# Patient Record
Sex: MALE | Race: WHITE | NOT HISPANIC OR LATINO | Employment: OTHER | URBAN - METROPOLITAN AREA
[De-identification: names, ages, dates, MRNs, and addresses within clinical notes are randomized per-mention and may not be internally consistent; named-entity substitution may affect disease eponyms.]

---

## 2023-09-15 ENCOUNTER — TRANSFERRED RECORDS (OUTPATIENT)
Dept: HEALTH INFORMATION MANAGEMENT | Facility: CLINIC | Age: 63
End: 2023-09-15

## 2024-02-12 ENCOUNTER — TRANSFERRED RECORDS (OUTPATIENT)
Dept: HEALTH INFORMATION MANAGEMENT | Facility: CLINIC | Age: 64
End: 2024-02-12

## 2024-03-23 ENCOUNTER — MEDICAL CORRESPONDENCE (OUTPATIENT)
Dept: TRANSPLANT | Facility: CLINIC | Age: 64
End: 2024-03-23

## 2024-03-25 ENCOUNTER — TELEPHONE (OUTPATIENT)
Dept: TRANSPLANT | Facility: CLINIC | Age: 64
End: 2024-03-25

## 2024-03-25 DIAGNOSIS — C90.00 MULTIPLE MYELOMA, REMISSION STATUS UNSPECIFIED (H): Primary | ICD-10-CM

## 2024-04-04 ENCOUNTER — CARE COORDINATION (OUTPATIENT)
Dept: TRANSPLANT | Facility: CLINIC | Age: 64
End: 2024-04-04

## 2024-04-04 NOTE — PROGRESS NOTES
[facility] BMT and Cell Therapy Program  RN Coordinator Pre-Visit Documentation      Inocente Romero is a 64 year old male who has been referred to the [facility] BMT and Cell Therapy Program for hematopoietic cell transplant or immune effector cell therapy.      Referring MD Name: Caitlyn Branch      Reason for referral: MM    Link to BMT & CT Program Algorithms            All relevant clinical notes, labs, imaging, and pathology may be reviewed in Epic Bookmarks under name: Katie Sawyer      Patient Care Team    No active team members.           Katie Sawyer RN

## 2024-04-05 ENCOUNTER — ALLIED HEALTH/NURSE VISIT (OUTPATIENT)
Dept: TRANSPLANT | Facility: CLINIC | Age: 64
End: 2024-04-05
Attending: INTERNAL MEDICINE
Payer: COMMERCIAL

## 2024-04-05 ENCOUNTER — TRANSFERRED RECORDS (OUTPATIENT)
Dept: HEALTH INFORMATION MANAGEMENT | Facility: CLINIC | Age: 64
End: 2024-04-05

## 2024-04-05 VITALS
RESPIRATION RATE: 16 BRPM | TEMPERATURE: 98 F | DIASTOLIC BLOOD PRESSURE: 105 MMHG | WEIGHT: 228.4 LBS | HEART RATE: 83 BPM | BODY MASS INDEX: 31.98 KG/M2 | OXYGEN SATURATION: 98 % | SYSTOLIC BLOOD PRESSURE: 182 MMHG | HEIGHT: 71 IN

## 2024-04-05 VITALS
HEART RATE: 83 BPM | HEIGHT: 71 IN | RESPIRATION RATE: 16 BRPM | BODY MASS INDEX: 31.98 KG/M2 | TEMPERATURE: 98 F | SYSTOLIC BLOOD PRESSURE: 182 MMHG | WEIGHT: 228.4 LBS | OXYGEN SATURATION: 98 % | DIASTOLIC BLOOD PRESSURE: 105 MMHG

## 2024-04-05 DIAGNOSIS — Z71.9 VISIT FOR COUNSELING: Primary | ICD-10-CM

## 2024-04-05 DIAGNOSIS — C90.00 MULTIPLE MYELOMA, REMISSION STATUS UNSPECIFIED (H): Primary | ICD-10-CM

## 2024-04-05 PROCEDURE — G0463 HOSPITAL OUTPT CLINIC VISIT: HCPCS

## 2024-04-05 PROCEDURE — 99205 OFFICE O/P NEW HI 60 MIN: CPT | Mod: GC

## 2024-04-05 RX ORDER — MAGNESIUM 200 MG
200 TABLET ORAL DAILY
COMMUNITY

## 2024-04-05 RX ORDER — ASPIRIN 81 MG/1
81 TABLET ORAL DAILY
COMMUNITY
End: 2024-07-15

## 2024-04-05 RX ORDER — LENALIDOMIDE 25 MG/1
25 CAPSULE ORAL
COMMUNITY
End: 2024-05-14

## 2024-04-05 RX ORDER — LISINOPRIL 10 MG/1
10 TABLET ORAL EVERY EVENING
Status: ON HOLD | COMMUNITY
End: 2024-07-30

## 2024-04-05 RX ORDER — ACYCLOVIR 800 MG/1
800 TABLET ORAL 2 TIMES DAILY
COMMUNITY
End: 2024-07-15

## 2024-04-05 RX ORDER — METOPROLOL SUCCINATE 100 MG/1
100 TABLET, EXTENDED RELEASE ORAL EVERY EVENING
COMMUNITY

## 2024-04-05 RX ORDER — ALLOPURINOL 300 MG/1
300 TABLET ORAL DAILY
COMMUNITY
End: 2024-07-15

## 2024-04-05 ASSESSMENT — PAIN SCALES - GENERAL
PAINLEVEL: NO PAIN (0)
PAINLEVEL: NO PAIN (0)

## 2024-04-05 NOTE — PROGRESS NOTES
BMT / Cell Therapy Consultation      Inocente Romero is a 64 year old male referred by Dr. Sterling Jackman for MM.       Disease presentation and baseline characteristics: [facility])  MM IgG K, R-ISS stage -, standard risk    At time of diagnosis:   8/2023: M spike 6.35,   BMBx kappa restricted plasma cells 70-80%,   PET/CT diffuse marrow hypermetabolism.     Date Treatment Name Response Side Effects / Toxicities   10/27/23-3/15/24 Vrd x 8 cycles  Fatigue, diarrhea, abdominal cramps   10/27/23 Zometa                          Date   Serum M spike Kappa  Lambda K/L  IgG  3/8/24  0.93        1429  2/23/24 1.47        2038  1/19/24 1.48        1770  8/2023  6.35      HPI:  Please see my entry above for hematologic history.  7/2023 seen at [facility] ER for bronchitis and diarrhea then found to be septic admitted to ICU for a week (not intubated) improved with antibiotics. Course was complicated by Afib during hospitalization improved. Off diltiazem. He was on metoprolol for HTN before for HTN. Since discharge, he never had Afib again. He was found to have Anemia to hgb 7.7. Eventual work up showed SPEP showed IgG Kappa M spike 6.35 IgG Kappa. Referred to [facility] and following Dr. Jackman. Subsequent BMBx led to diagnosis of MM.     Felix is with his wife Comfort today. He reports he is doing well. Sometimes has occasional diarrhea and fatigue. Fatigue mostly in the afternoons. Fatigue has been gradually increasing with further cycles. He reports gaining 8 pounds in week and half. He reports he has been eating much. Otherwise he feels pretty well.   Denies neuropathy issues.   Had some concerns of signitive issues. Had a pre tranplant mental status exam at [facility]- deemed to have good understanding.  Today he notes that he did not do the pysch test that well. He reports he is usually a very shrap person and has great memory. But since treatment, he reports feeling chemo brain fog.  Wife Comfort  has been a good support system.    Today [facility]    ASSESSMENT AND PLAN:  #MM  KPS 90    HCTCI: 1, 3% NRM    He has good partial response to the current treatment almost heading close to VGPR. He is young and qualifies for the auto Transplant.    Today we discussed the process of autologous stem cell transplant for myeloma in detail.  We reviewed that transplant for multiple myeloma is not curative, but does provide the opportunity for improved disease control and longer remission following induction.  We discussed the process of pre-transplant organ evaluation followed by stem cell collection, high-dose chemotherapy and stem cell infusion.  Risks of autologous transplant were outlined, including but not limited to low blood counts, need for transfusions, secondary malignancies, and possible hospitalization.  I reviewed that although the procedure will be done in the outpatient setting there is an approximately 50% chance of admission to the hospital due to complications in the weeks following transplant.  I discussed that autologous transplant is a relatively safe procedure but still typically carries a 1-3% mortality risk.  I also reviewed that following transplant for a month a 24/7 caregiver is required.      Assuming transplant goes well at D+28 restaging is performed and the patient will then re-establish with their primary oncologist.  Maintenance therapy is recommended to start after D+60 assuming the patient is clinically doing well.  I reviewed with the patient that maintenance therapy may be continued for several years following transplant assuming continued remission.  We reviewed that following transplant repeat vaccinations are recommended.     The patient and  had a number of appropriate questions that were answered today and felt comfortable proceeding with transplant following induction. He prefers to plan transplant after July 3rd to ensure he drives his  son to [email or web address]. He is agreeable to have stem cell collected mid May accordingly.       #Hx of Afib during sepsis episode  Improved per patient.   On metoprolol daily      #HTN:  On metoprolol and lisinopril. Today BP , asymptomatic. [facility]. BP higher on day he gets sterioids and decrease other days when he checks home bP.  - Could consider reducing dose of dexamethasone.      Plan:   -  will initiate process for auto SCT- per patient request plan [place] autSCT after sometime after July3rd.   - Plan stem cell mobilization around mid May.  - Labs, BMBX, PET/CT to determine treatment response, TTE, PFTs in May  - meanwhile continue MM treatment with Dr. Jackman.  - Consider reducing dexamethasone dose for elevated BP and weight gain  - Continue ACV, aspirin  - Arrange BMT follow up to review pre transplant work up.    I spent 60 minutes in the care of this patient today, which included time necessary for preparation for the visit, obtaining history, ordering medications/tests/procedures as medically indicated, review of pertinent medical literature, counseling of the patient, communication of recommendations to the care team, and documentation time.    Patient seen and discussed with Dr. Carlin. Please see addendum for further details.    Ana Marvin MD  Hematology and Medical Oncology Fellow, PGY-5  [facility]  Pager: [phone]      ROS:    10 point ROS neg other than the symptoms noted above in the HPI.      No past medical history on file.  PMHx:  HTN  Hearing loss,   Obesity  ED  Sepsis requiring ICU  not intubated (7/2023)  Anemia  Afib (7/2023) during sepsis- improved since then      Some concerns of cognitive issues.     No past surgical history on file.    No family history on file.    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       No Known Allergies     Current Outpatient Medications   Medication Sig Dispense Refill    ACYCLOVIR PO Take 1,600 mg by mouth daily      allopurinol (ZYLOPRIM) 300 MG tablet Take 300 mg by mouth daily      aspirin 81 MG EC tablet Take 81 mg by mouth daily      dexAMETHasone 0.4 MG INST Take 40 mg by mouth once a week      LENalidomide (REVLIMID) 25 MG CAPS capsule Take 25 mg by mouth      lisinopril (ZESTRIL) 10 MG tablet Take 10 mg by mouth daily      magnesium 200 MG TABS Take 200 mg by mouth daily      metoprolol succinate ER (TOPROL XL) 100 MG 24 hr tablet Take 100 mg by mouth daily           Physical Exam:     Vital Signs: BP (!) 182/105 (BP Location: Right arm, Patient Position: Sitting, Cuff Size: Adult Regular)   Pulse 83   Temp 98  F (36.7  C) (Oral)   Resp 16   Ht 1.8 m (5' 10.87\")   Wt 103.6 kg (228 lb 6.4 oz)   SpO2 98%   BMI 31.98 kg/m      KPS:  90  General Appearance: alert, and no distress  Eyes: PERRL, conjunctiva and lids normal, sclera nonicteric  Ears/Nose/M/Throat: Oral mucosa and posterior oropharynx normal, moist mucous membranes  Neck supple, non-tender, free range of motion, no adenopathy  Cardio/Vascular:regular rate and rhythm, normal S1 and S2, no murmur  Resp Effort And Auscultation: Normal - Clear to auscultation without rales, rhonchi, or wheezing.  GI: soft, nontender, bowel sounds present in all four quadrants, no hepatosplenomegaly  Lymphatics:no significant enlargement of lymph nodes globally   Musculoskeletal: Musculoskeletal normal  Edema: none  Skin: Skin color, texture, turgor normal. No rashes or lesions.  Neurologic: Gait normal.  Sensation grossly " "WNL.  Psych/Affect: Mood and affect are appropriate.  Vascular Access:  none    Blood Counts     No lab results found.    Invalid input(s): \"AIG3\"  At time of diagnosis:      Most recent labs   Hgb improved to 12          Chemistries     Basic Panel  No lab results found.     Calcium, Magnesium, Phosphorus  No lab results found.     LFTs  No lab results found.    LDH  No lab results found.    B2-Microglobulin  No lab results found.      Immunoglobulins     No lab results found.    No lab results found.    No lab results found.      Monocloncal Protein Studies     M spike    No lab results found.    Kappa FLC    No lab results found.    Lambda FLC    No lab results found.    FLC Ratio    No lab results found.      Bone Marrow Biopsy       Morphology    No results found for this or any previous visit (from the past 8760 hour(s)).                    PET Scan       No results found for this or any previous visit.           Inocente understood the above assessment and recommendations.  Multiple questions answered.  No barriers to learning identified.       ECHO:  Other diagnostics:      PFTs:    ------------------------------------------------------------------------------------------------------------------------------------------------    Patient Care Team    No active team members.       "

## 2024-04-05 NOTE — NURSING NOTE
"Oncology Rooming Note    April 5, 2024 1:02 PM   Inocente Romero is a 64 year old male who presents for:    Chief Complaint   Patient presents with    Oncology Clinic Visit     Multiple Myeloma     Initial Vitals: BP (!) 182/105 (BP Location: Right arm, Patient Position: Sitting, Cuff Size: Adult Regular)   Pulse 83   Temp 98  F (36.7  C) (Oral)   Resp 16   Ht 1.8 m (5' 10.87\")   Wt 103.6 kg (228 lb 6.4 oz)   SpO2 98%   BMI 31.98 kg/m   Estimated body mass index is 31.98 kg/m  as calculated from the following:    Height as of this encounter: 1.8 m (5' 10.87\").    Weight as of this encounter: 103.6 kg (228 lb 6.4 oz). Body surface area is 2.28 meters squared.  No Pain (0) Comment: Data Unavailable   No LMP for male patient.  Allergies reviewed: Yes  Medications reviewed: Yes    Medications: Medication refills not needed today.  Pharmacy name entered into EPIC: Data Unavailable    Frailty Screening:   Is the patient here for a new oncology consult visit in cancer care? 1. Yes. Over the past month, have you experienced difficulty or required a caregiver to assist with:   1. Balance, walking or general mobility (including any falls)? NO  2. Completion of self-care tasks such as bathing, dressing, toileting, grooming/hygiene?  NO  3. Concentration or memory that affects your daily life?  NO       Clinical concerns: none       Lindsey Rich              "

## 2024-04-05 NOTE — PROGRESS NOTES
Blood and Marrow Transplant - New Evaluation Appointment    Spoke with Felix and patient's spouse, following visit with Dr. Carlin. I explained the role of the nurse coordinator throughout the process, as well as general time line and expectations for next steps. We discussed the necessity of a caregiver and the program's proximity requirements. All questions were answered.     Plan: Autologous transplant    Timeline Notes:May or June, pt ready to proceed now but could get one more cycle of therapy if he chooses to, pt will call intake with decision on timing    Contact information provided for :  yes      Auto for MM/Amyloidosis:  Outpatient Infusion: Yes    Phase Status updated: yes

## 2024-04-05 NOTE — NURSING NOTE
[facility] Frailty assessment completed with patient in clinic. Patient had no questions and showed understanding of what assessment was being done.    Kavon Biggs LPN on 4/5/2024 at 3:54 PM

## 2024-04-05 NOTE — PROGRESS NOTES
"Blood and Marrow Transplant   New Transplant Visit with   Clinical     Assessment completed on 4/5/2024 in the BMT clinic of living situation, support system, financial status, functional status, coping, stressors, need for resources and social work intervention provided as needed. Information for this assessment was provided by pt and pt's spouse Comfort's report, consultation with medical team, and medical chart review.      Present:  Patient: Inocente Romero (\"Felix\")  Spouse: Comfort Romero  : ANALI Thurman, [facility]    Medical Team   Nurse Coordinator: Katie Sawyer RN  BMT Physician: Gianfranco Carlin MD  Referring Physician: Caitlyn Branch MD    Diagnosis: Multiple Myeloma  Diagnosis Date: 8/2023    Presenting Information:  Pt is a 64 year old male diagnosed with Multiple Myeloma. Pt was diagnosed on 8/2023. Pt presents for OP Autologous stem cell transplant discussion.    Contact Information:  Pt's Cell Phone: [phone]  Pt's Spouse Comfort's Phone: [phone]  Pt's Email: [email or web address]  Pt's Spouse Comfort's Email: [email or web address]    Of Note: Pt requests any email communication to be sent to both his and spouse's email.     Special Needs:   No needs identified at this time.     Relocation Requirement:   Pt lives in [place], [place] (approximately 45 minutes from [facility]) which is within the required distance of the hospital. Pt does not need to relocate.     Living Situation:   Pt lives in [place], [place] with spouse Comfort and 2 children (Song-13 and Charli-9).    Family Information:   Spouse: Comfort Romero  Parents: Mother Mary Jane and Father Inocente (travel to/from [place] 6 months out of the year)  Siblings: Sister Abiola Sharp-61 ([place], [place]) and Sister Rosibel-54 ([place])  Children: Malik-37 ([facility] [place]); Constantin-31 ([place], [place]); Song-13; Charli-9    Education/Employment:  Currently employed: No  Previous Employer: [employer]  Occupation: Special Needs " Transport     Spouse/Partner Employed: Yes-F/T  Occupation: Human Resources  *Can work remotely-place of employment is approximately a mile from residence.    Insurance:   [facility]. No insurance concerns identified at this time. HAMIDA provided information regarding the insurance authorization process and the role of the [employer] . HAMIDA provided contact info for the [employer]  and referred pt to them for future insurance questions.     Of Note: [facility]. Pt/Spouse shared that the [facility] can provide transportation reimbursement for parking related costs.      Finances:   Pt's source of income is salary/wages from pts spouse's employment. Pt shared that he plans to begin to receive Social Security income in May/2024. No financial concerns identified at this time. HAMIDA discussed carline options and asked pt to let SW know if they would like to apply in the future.     Caregiver:   HAMIDA discussed with the pt and spouse the caregiver role and expectation at length. Pt is agreeable to having a full time caregiver for the minimum of 30 days until cleared by the BMT Physician. Pt's identified caregiver is spouse Comfort. Caregiver education and information provided. No caregiver concerns identified.     Healthcare Directive:  No. SW provided education and forms. SW encouraged pt to have discussions with their family regarding their health care wishes.  In the absence of a healthcare document, HAMIDA discussed the [place] Policy on who would make decisions on pts behalf if he did not have the capacity to make healthcare decisions.    Resources Provided:  -BMT Information Book  -BMT Resources Packet  -Healthcare Directive  -Honoring Choices - Your Rights: Making Your Own Health Care Treatment Decisions  -Caregiver Contract/Description  -Transplant Unit Description and Information   -Lodging Resources    Identified Concerns:  No concerns identified at this time.  "    Summary:  Pt presents to [facility] regarding an OP Autologous stem cell transplant. Pt and pt's spouse asked good/appropriate questions regarding psychosocial factors related to BMT; all questions were addressed. Pt presented as pleasant. Pt's affect was appropriate. Patient indicated they are currently feeling \"overwhelmed by all the information\". Family's affect was appropriate.    Plan:   SW provided contact information and encouraged pt to contact SW with any additional questions, concerns, resources and/or for support. SW will continue to follow pt to provide support and guidance with resources as needed.     ANALI Thurman, [facility]  Adult Blood & Marrow Transplant   Phone: [phone]  Pager: [phone]      "

## 2024-04-05 NOTE — LETTER
4/5/2024         RE: Inocente Romero  [address]  [place] [address]        Dear Colleague,    Thank you for referring your patient, Inocente Romero, to the [facility] BLOOD AND MARROW TRANSPLANT PROGRAM [place]. Please see a copy of my visit note below.         BMT / Cell Therapy Consultation      Inocente Romero is a 64 year old male referred by Dr. Sterling Jackman for MM.       Disease presentation and baseline characteristics: [facility])  MM IgG K, R-ISS stage -, standard risk    At time of diagnosis:   8/2023: M spike 6.35,   BMBx kappa restricted plasma cells 70-80%,   PET/CT diffuse marrow hypermetabolism.     Date Treatment Name Response Side Effects / Toxicities   10/27/23-3/15/24 Vrd x 8 cycles  Fatigue, diarrhea, abdominal cramps   10/27/23 Zometa                          Date   Serum M spike Kappa  Lambda K/L  IgG  3/8/24  0.93        1429  2/23/24 1.47        2038  1/19/24 1.48        1770  8/2023  6.35      HPI:  Please see my entry above for hematologic history.  7/2023 seen at [facility] ER for bronchitis and diarrhea then found to be septic admitted to ICU for a week (not intubated) improved with antibiotics. Course was complicated by Afib during hospitalization improved. Off diltiazem. He was on metoprolol for HTN before for HTN. Since discharge, he never had Afib again. He was found to have Anemia to hgb 7.7. Eventual work up showed SPEP showed IgG Kappa M spike 6.35 IgG Kappa. Referred to [facility] and following Dr. Jackman. Subsequent BMBx led to diagnosis of MM.     Felix is with his wife Comfort today. He reports he is doing well. Sometimes has occasional diarrhea and fatigue. Fatigue mostly in the afternoons. Fatigue has been gradually increasing with further cycles. He reports gaining 8 pounds in week and half. He reports he has been eating much. Otherwise he feels pretty well.   Denies neuropathy issues.   Had some concerns of signitive  issues. Had a pre tranplant mental status exam at [facility]- deemed to have good understanding.  Today he notes that he did not do the pysch test that well. He reports he is usually a very shrap person and has great memory. But since treatment, he reports feeling chemo brain fog.  Wife Comfort has been a good support system.    Today [facility]    ASSESSMENT AND PLAN:  #MM  KPS 90    HCTCI: 1, 3% NRM    He has good partial response to the current treatment almost heading close to VGPR. He is young and qualifies for the auto Transplant.    Today we discussed the process of autologous stem cell transplant for myeloma in detail.  We reviewed that transplant for multiple myeloma is not curative, but does provide the opportunity for improved disease control and longer remission following induction.  We discussed the process of pre-transplant organ evaluation followed by stem cell collection, high-dose chemotherapy and stem cell infusion.  Risks of autologous transplant were outlined, including but not limited to low blood counts, need for transfusions, secondary malignancies, and possible hospitalization.  I reviewed that although the procedure will be done in the outpatient setting there is an approximately 50% chance of admission to the hospital due to complications in the weeks following transplant.  I discussed that autologous transplant is a relatively safe procedure but still typically carries a 1-3% mortality risk.  I also reviewed that following transplant for a month a 24/7 caregiver is required.      Assuming transplant goes well at D+28 restaging is performed and the patient will then re-establish with their primary oncologist.  Maintenance therapy is recommended to start after D+60 assuming the patient is clinically doing well.  I reviewed with the patient that maintenance therapy may be continued for several years following transplant assuming continued remission.  We reviewed  that following transplant repeat vaccinations are recommended.     The patient and  had a number of appropriate questions that were answered today and felt comfortable proceeding with transplant following induction. He prefers to plan transplant after July 3rd to ensure he drives his son to baseballl league. He is agreeable to have stem cell collected mid May accordingly.       #Hx of Afib during sepsis episode  Improved per patient.   On metoprolol daily      #HTN:  On metoprolol and lisinopril. Today BP , asymptomatic. [facility]. BP higher on day he gets sterioids and decrease other days when he checks home bP.  - Could consider reducing dose of dexamethasone.      Plan:   -  will initiate process for auto SCT- per patient request plan fort autSCT after sometime after July3rd.   - Plan stem cell mobilization around mid May.  - Labs, BMBX, PET/CT to determine treatment response, TTE, PFTs in May  - meanwhile continue MM treatment with Dr. Jackman.  - Consider reducing dexamethasone dose for elevated BP and weight gain  - Continue ACV, aspirin  - Arrange BMT follow up to review pre transplant work up.    I spent 60 minutes in the care of this patient today, which included time necessary for preparation for the visit, obtaining history, ordering medications/tests/procedures as medically indicated, review of pertinent medical literature, counseling of the patient, communication of recommendations to the care team, and documentation time.    Patient seen and discussed with Dr. Carlin. Please see addendum for further details.    Ana Marvin MD  Hematology and Medical Oncology Fellow, PGY-5  [facility]  Pager: [phone]      ROS:    10 point ROS neg other than the symptoms noted above in the HPI.      No past medical history on file.  PMHx:  HTN  Hearing loss,   Obesity  ED  Sepsis requiring ICU  not intubated (7/2023)  Anemia  Afib (7/2023) during sepsis-  "improved since then      Some concerns of cognitive issues.     No past surgical history on file.    No family history on file.    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       No Known Allergies     Current Outpatient Medications   Medication Sig Dispense Refill    ACYCLOVIR PO Take 1,600 mg by mouth daily      allopurinol (ZYLOPRIM) 300 MG tablet Take 300 mg by mouth daily      aspirin 81 MG EC tablet Take 81 mg by mouth daily      dexAMETHasone 0.4 MG INST Take 40 mg by mouth once a week      LENalidomide (REVLIMID) 25 MG CAPS capsule Take 25 mg by mouth      lisinopril (ZESTRIL) 10 MG tablet Take 10 mg by mouth daily      magnesium 200 MG TABS Take 200 mg by mouth daily      metoprolol succinate ER (TOPROL XL) 100 MG 24 hr tablet Take 100 mg by mouth daily           Physical Exam:     Vital Signs: BP (!) 182/105 (BP Location: Right arm, Patient Position: Sitting, Cuff Size: Adult Regular)   Pulse 83   Temp 98  F (36.7  C) (Oral)   Resp 16   Ht 1.8 m (5' 10.87\")   Wt 103.6 kg (228 lb 6.4 oz)   SpO2 98%   BMI 31.98 kg/m      KPS:  90  General Appearance: alert, and no distress  Eyes: PERRL, conjunctiva and lids normal, sclera nonicteric  Ears/Nose/M/Throat: Oral mucosa and posterior oropharynx normal, moist mucous membranes  Neck supple, non-tender, free range of motion, no adenopathy  Cardio/Vascular:regular rate and rhythm, normal S1 and S2, no murmur  Resp Effort And Auscultation: Normal - Clear to auscultation without rales, rhonchi, or wheezing.  GI: soft, nontender, " "bowel sounds present in all four quadrants, no hepatosplenomegaly  Lymphatics:no significant enlargement of lymph nodes globally   Musculoskeletal: Musculoskeletal normal  Edema: none  Skin: Skin color, texture, turgor normal. No rashes or lesions.  Neurologic: Gait normal.  Sensation grossly WNL.  Psych/Affect: Mood and affect are appropriate.  Vascular Access:  none    Blood Counts     No lab results found.    Invalid input(s): \"AIG3\"  At time of diagnosis:      Most recent labs   Hgb improved to 12          Chemistries     Basic Panel  No lab results found.     Calcium, Magnesium, Phosphorus  No lab results found.     LFTs  No lab results found.    LDH  No lab results found.    B2-Microglobulin  No lab results found.      Immunoglobulins     No lab results found.    No lab results found.    No lab results found.      Monocloncal Protein Studies     M spike    No lab results found.    Kappa FLC    No lab results found.    Lambda FLC    No lab results found.    FLC Ratio    No lab results found.      Bone Marrow Biopsy       Morphology    No results found for this or any previous visit (from the past 8760 hour(s)).                    PET Scan       No results found for this or any previous visit.           Inocente understood the above assessment and recommendations.  Multiple questions answered.  No barriers to learning identified.       ECHO:  Other diagnostics:      PFTs:    ------------------------------------------------------------------------------------------------------------------------------------------------    Patient Care Team    No active team members.       Attestation signed by Gianfranco Carlin MD at 4/5/2024  4:49 PM:  I, Gianfranco Carlin MD, have personally seen this patient independently of the fellow, Dr. Marvin. I have personally reviewed vital signs, medications, labs, imaging, and hospital course. I agree with their findings and plan of care as documented in the note with the following " additions/exceptions:    Key findings:  Today, I met with Inocente Romero and his wife. We discussed the natural history of the disease and treatment to date. We reviewed all the available diagnostic information and briefly discussed some of the more important prognostic points. We talked about general indications of transplant that include patient and disease factors. We also reviewed again the role of high dose chemotherapy and autologous stem cell rescue in this disease. I described the process of work up prior to collection, the process of collection itself including the possibility for a need for a central line with possible associated complications of infections and clotting. I discussed the process of work up prior to to confirm good organ function and reserve and reassess disease prior to proceeding. We also discussed in great detail the process of the actual transplant itself. We discussed the expected toxicities and side effects, including organ toxicity, expected pancytopenia and need for transfusion support, risk of infection or bleeding, and the risk of treatment related mortality which is estimated to be around 1-2%. We also discussed supportive care, needing a line with associated complications, and the critical need for a caregiver and proximity to [facility].    All of their questions were answered to their satisfaction.    Standard risk MM, now in [place] coming close to VGPR after VRd. Feels well. Has some diarrhea and side effects with steroids up and down but still fairly active and independent. Well read on ASCT and other issues. Interested in pursuing. He appears to be a good candidate. We discussed timing and he was in between May and June and will let us know because his kids have a baseball tournament. It could be that we collect him end of May and transplant early June.    Gianfranco Carlin MD  Date of Service (when I saw the patient): 04/05/2024     60 minutes spent on the date of the encounter  doing chart review, interpretation of results, patient visit, documentation and coordination of care.

## 2024-04-16 DIAGNOSIS — Z01.818 EXAMINATION PRIOR TO CHEMOTHERAPY: ICD-10-CM

## 2024-04-16 DIAGNOSIS — Z86.2 PERSONAL HISTORY OF DISEASES OF BLOOD AND BLOOD-FORMING ORGANS: ICD-10-CM

## 2024-04-16 DIAGNOSIS — E55.9 VITAMIN D DEFICIENCY: Primary | ICD-10-CM

## 2024-04-16 DIAGNOSIS — C90.00 MULTIPLE MYELOMA, REMISSION STATUS UNSPECIFIED (H): ICD-10-CM

## 2024-04-17 ENCOUNTER — TELEPHONE (OUTPATIENT)
Dept: TRANSPLANT | Facility: CLINIC | Age: 64
End: 2024-04-17

## 2024-04-17 NOTE — TELEPHONE ENCOUNTER
"BMT CSW Telephone Encounter  Clinical Social Work  [facility]      Focus: Supportive Counseling /Resources    Data: Pt is a 64 year old male undergoing evaluation for an OP Autologous transplant for Multiple Myeloma.    Interventions: Clinical  (CSW) provided a return call and spoke w/ Pts spouse Comfort via phone on 4/17/2024 to assist with inquiries about caregiver responsibilities and scheduling issues.  Comfort shared that pt reached out to the BMT office yesterday to inquire about the possibility of workup being pushed back due to various family obligations.  However, pt/spouse were informed that workup could not be pushed back and would need to remain on the current schedule.  CSW addressed questions regarding caregiver responsibilities.  Pts spouse Comfort shared that she has been feeling \"overwhelmed\" as work up has been scheduled earlier than anticipated. Comfort shared that she had not realized that patient would be \"completely outpatient\" and had anticipated that pt would be admitted to the hospital for a period of time. CSW provided explanations and education about process.  Comfort acknowledged more clarity and understanding about the process.  CSW offered to provide more information regarding support resources. Comfort reported that she will reach out if further resources are needed.  She shared that she has connected with some online support resources and chat groups. CSW assessed coping, provide supportive counseling and assist with resources as needed. SW encouraged Pt to contact CSW for support, questions and/or resources.    Assessment: Pt continues to be supported by spouse Comfort.     Plan: CSW will continue to work with Pt and family to provide supportive counseling and assist with resources as needed. CSW will continue to collaborate with multidisciplinary team regarding Pt's plan of care.     ANALI Thurman, [facility]  Adult Blood & Marrow Transplant Social " Worker  Phone: [phone]  VOCERA SEARCHABLE: BMT SW #4  Support Groups at [facility]: Social Work Services for Cancer Patients ([email or web address])

## 2024-05-06 ENCOUNTER — TELEPHONE (OUTPATIENT)
Dept: TRANSPLANT | Facility: CLINIC | Age: 64
End: 2024-05-06

## 2024-05-06 NOTE — TELEPHONE ENCOUNTER
BMT CSW Telephone Encounter  Clinical Social Work  [facility]      Focus: Parking Inquiries    Data: Pt is a 64 year old male undergoing evaluation for an OP Autologous transplant for Multiple Myeloma.     Interventions: Clinical  (CSW) spoke w/ Pt via phone on 5/6/2024 to assist with inquiries about parking.  CSW addressed all questions regarding parking passes. CSW assessed coping, provide supportive counseling and assist with resources as needed. SW encouraged Pt to contact CSW for support, questions and/or resources.    Assessment: Pt presented as pleasant.  Pt appears to be coping appropriately at this time. Pt continues to be supported by spouse Comfort.     Plan: CSW will continue to work with Pt and family to provide supportive counseling and assist with resources as needed. CSW will continue to collaborate with multidisciplinary team regarding Pt's plan of care.     ANALI Thurman, [facility]  Adult Blood & Marrow Transplant   Phone: [phone]  VEASYT SEARCHABLE: BMT SW #4  Securely message with Getting-in   Support Groups at [facility]: Social Work Services for Cancer Patients ([email or web address])

## 2024-05-08 LAB
ABC CLONOSEQ B-CELL CLONALITY (ID) RESULT: NORMAL
ABC DOMINANT SEQUENCES (B-CELL): 2
ABC TOTAL NUCLEATED CELLS (B-CELL): NORMAL

## 2024-05-12 LAB
ABO/RH(D): NORMAL
ANTIBODY SCREEN: NEGATIVE
SPECIMEN EXPIRATION DATE: NORMAL

## 2024-05-13 ENCOUNTER — MEDICAL CORRESPONDENCE (OUTPATIENT)
Dept: TRANSPLANT | Facility: CLINIC | Age: 64
End: 2024-05-13

## 2024-05-13 ENCOUNTER — LAB (OUTPATIENT)
Dept: LAB | Facility: CLINIC | Age: 64
End: 2024-05-13
Attending: INTERNAL MEDICINE
Payer: COMMERCIAL

## 2024-05-13 ENCOUNTER — OFFICE VISIT (OUTPATIENT)
Dept: TRANSPLANT | Facility: CLINIC | Age: 64
End: 2024-05-13
Attending: INTERNAL MEDICINE
Payer: COMMERCIAL

## 2024-05-13 VITALS
WEIGHT: 231.4 LBS | OXYGEN SATURATION: 96 % | BODY MASS INDEX: 32.4 KG/M2 | SYSTOLIC BLOOD PRESSURE: 177 MMHG | DIASTOLIC BLOOD PRESSURE: 101 MMHG

## 2024-05-13 VITALS
BODY MASS INDEX: 32.27 KG/M2 | HEART RATE: 63 BPM | RESPIRATION RATE: 18 BRPM | WEIGHT: 230.5 LBS | DIASTOLIC BLOOD PRESSURE: 109 MMHG | SYSTOLIC BLOOD PRESSURE: 190 MMHG | OXYGEN SATURATION: 98 % | TEMPERATURE: 97.8 F

## 2024-05-13 DIAGNOSIS — Z01.818 EXAMINATION PRIOR TO CHEMOTHERAPY: ICD-10-CM

## 2024-05-13 DIAGNOSIS — C90.00 MULTIPLE MYELOMA, REMISSION STATUS UNSPECIFIED (H): Primary | ICD-10-CM

## 2024-05-13 DIAGNOSIS — E55.9 VITAMIN D DEFICIENCY: ICD-10-CM

## 2024-05-13 DIAGNOSIS — C90.00 PLASMA CELL MYELOMA (H): Primary | ICD-10-CM

## 2024-05-13 DIAGNOSIS — Z86.2 PERSONAL HISTORY OF DISEASES OF BLOOD AND BLOOD-FORMING ORGANS: ICD-10-CM

## 2024-05-13 DIAGNOSIS — C90.00 MULTIPLE MYELOMA, REMISSION STATUS UNSPECIFIED (H): ICD-10-CM

## 2024-05-13 LAB
ALBUMIN SERPL BCG-MCNC: 4.6 G/DL (ref 3.5–5.2)
ALBUMIN UR-MCNC: 10 MG/DL
ALP SERPL-CCNC: 48 U/L (ref 40–150)
ALT SERPL W P-5'-P-CCNC: 16 U/L (ref 0–70)
ANION GAP SERPL CALCULATED.3IONS-SCNC: 10 MMOL/L (ref 7–15)
APPEARANCE UR: CLEAR
APTT PPP: 29 SECONDS (ref 22–38)
AST SERPL W P-5'-P-CCNC: 20 U/L (ref 0–45)
BASOPHILS # BLD AUTO: 0 10E3/UL (ref 0–0.2)
BASOPHILS NFR BLD AUTO: 1 %
BILIRUB SERPL-MCNC: 0.6 MG/DL
BILIRUB UR QL STRIP: NEGATIVE
BMT WORKUP IRRADIATED BLOOD REQUIRED: NORMAL
BUN SERPL-MCNC: 15.5 MG/DL (ref 8–23)
CALCIUM SERPL-MCNC: 9.4 MG/DL (ref 8.8–10.2)
CHLORIDE SERPL-SCNC: 105 MMOL/L (ref 98–107)
COLOR UR AUTO: ABNORMAL
CREAT SERPL-MCNC: 0.85 MG/DL (ref 0.67–1.17)
DEPRECATED HCO3 PLAS-SCNC: 26 MMOL/L (ref 22–29)
EGFRCR SERPLBLD CKD-EPI 2021: >90 ML/MIN/1.73M2
EOSINOPHIL # BLD AUTO: 0 10E3/UL (ref 0–0.7)
EOSINOPHIL NFR BLD AUTO: 1 %
ERYTHROCYTE [DISTWIDTH] IN BLOOD BY AUTOMATED COUNT: 14.7 % (ref 10–15)
GLUCOSE SERPL-MCNC: 90 MG/DL (ref 70–99)
GLUCOSE UR STRIP-MCNC: NEGATIVE MG/DL
HCT VFR BLD AUTO: 40.1 % (ref 40–53)
HGB BLD-MCNC: 13.4 G/DL (ref 13.3–17.7)
HGB UR QL STRIP: NEGATIVE
HOLD SPECIMEN: NORMAL
HOLD SPECIMEN: NORMAL
IMM GRANULOCYTES # BLD: 0 10E3/UL
IMM GRANULOCYTES NFR BLD: 1 %
INR PPP: 1.01 (ref 0.85–1.15)
KETONES UR STRIP-MCNC: NEGATIVE MG/DL
LDH SERPL L TO P-CCNC: 210 U/L (ref 0–250)
LEUKOCYTE ESTERASE UR QL STRIP: NEGATIVE
LYMPHOCYTES # BLD AUTO: 1 10E3/UL (ref 0.8–5.3)
LYMPHOCYTES NFR BLD AUTO: 24 %
MAGNESIUM SERPL-MCNC: 2 MG/DL (ref 1.7–2.3)
MCH RBC QN AUTO: 31.8 PG (ref 26.5–33)
MCHC RBC AUTO-ENTMCNC: 33.4 G/DL (ref 31.5–36.5)
MCV RBC AUTO: 95 FL (ref 78–100)
MONOCYTES # BLD AUTO: 0.4 10E3/UL (ref 0–1.3)
MONOCYTES NFR BLD AUTO: 10 %
MUCOUS THREADS #/AREA URNS LPF: PRESENT /LPF
NEUTROPHILS # BLD AUTO: 2.9 10E3/UL (ref 1.6–8.3)
NEUTROPHILS NFR BLD AUTO: 63 %
NITRATE UR QL: NEGATIVE
NRBC # BLD AUTO: 0 10E3/UL
NRBC BLD AUTO-RTO: 0 /100
PH UR STRIP: 5.5 [PH] (ref 5–7)
PHOSPHATE SERPL-MCNC: 2.8 MG/DL (ref 2.5–4.5)
PLATELET # BLD AUTO: 197 10E3/UL (ref 150–450)
POTASSIUM SERPL-SCNC: 3.8 MMOL/L (ref 3.4–5.3)
PROT SERPL-MCNC: 7.4 G/DL (ref 6.4–8.3)
RBC # BLD AUTO: 4.21 10E6/UL (ref 4.4–5.9)
RBC URINE: 1 /HPF
SODIUM SERPL-SCNC: 141 MMOL/L (ref 135–145)
SP GR UR STRIP: 1.02 (ref 1–1.03)
SPECIMEN EXPIRATION DATE: NORMAL
SQUAMOUS EPITHELIAL: <1 /HPF
TOTAL PROTEIN SERUM FOR ELP: 7.2 G/DL (ref 6.4–8.3)
URATE SERPL-MCNC: 4 MG/DL (ref 3.4–7)
UROBILINOGEN UR STRIP-MCNC: NORMAL MG/DL
VIT D+METAB SERPL-MCNC: 19 NG/ML (ref 20–50)
WBC # BLD AUTO: 4.4 10E3/UL (ref 4–11)
WBC URINE: 2 /HPF

## 2024-05-13 PROCEDURE — 86665 EPSTEIN-BARR CAPSID VCA: CPT

## 2024-05-13 PROCEDURE — 999N000102 HC STATISTIC NO CHARGE CLINIC VISIT

## 2024-05-13 PROCEDURE — 84550 ASSAY OF BLOOD/URIC ACID: CPT

## 2024-05-13 PROCEDURE — 93005 ELECTROCARDIOGRAM TRACING: CPT | Mod: RTG

## 2024-05-13 PROCEDURE — 86695 HERPES SIMPLEX TYPE 1 TEST: CPT

## 2024-05-13 PROCEDURE — 36415 COLL VENOUS BLD VENIPUNCTURE: CPT

## 2024-05-13 PROCEDURE — 86334 IMMUNOFIX E-PHORESIS SERUM: CPT | Mod: 26 | Performed by: PATHOLOGY

## 2024-05-13 PROCEDURE — 86335 IMMUNFIX E-PHORSIS/URINE/CSF: CPT | Mod: 26 | Performed by: PATHOLOGY

## 2024-05-13 PROCEDURE — 82784 ASSAY IGA/IGD/IGG/IGM EACH: CPT

## 2024-05-13 PROCEDURE — 85730 THROMBOPLASTIN TIME PARTIAL: CPT

## 2024-05-13 PROCEDURE — 84165 PROTEIN E-PHORESIS SERUM: CPT | Mod: TC | Performed by: PATHOLOGY

## 2024-05-13 PROCEDURE — 86696 HERPES SIMPLEX TYPE 2 TEST: CPT

## 2024-05-13 PROCEDURE — 83615 LACTATE (LD) (LDH) ENZYME: CPT

## 2024-05-13 PROCEDURE — 83521 IG LIGHT CHAINS FREE EACH: CPT | Mod: 59

## 2024-05-13 PROCEDURE — 83735 ASSAY OF MAGNESIUM: CPT

## 2024-05-13 PROCEDURE — 86777 TOXOPLASMA ANTIBODY: CPT

## 2024-05-13 PROCEDURE — 87516 HEPATITIS B DNA AMP PROBE: CPT

## 2024-05-13 PROCEDURE — 93010 ELECTROCARDIOGRAM REPORT: CPT | Performed by: INTERNAL MEDICINE

## 2024-05-13 PROCEDURE — 86803 HEPATITIS C AB TEST: CPT

## 2024-05-13 PROCEDURE — 84100 ASSAY OF PHOSPHORUS: CPT

## 2024-05-13 PROCEDURE — 82306 VITAMIN D 25 HYDROXY: CPT

## 2024-05-13 PROCEDURE — 85610 PROTHROMBIN TIME: CPT

## 2024-05-13 PROCEDURE — 88321 CONSLTJ&REPRT SLD PREP ELSWR: CPT | Performed by: STUDENT IN AN ORGANIZED HEALTH CARE EDUCATION/TRAINING PROGRAM

## 2024-05-13 PROCEDURE — 84155 ASSAY OF PROTEIN SERUM: CPT | Mod: 91

## 2024-05-13 PROCEDURE — 82232 ASSAY OF BETA-2 PROTEIN: CPT

## 2024-05-13 PROCEDURE — 88240 CELL CRYOPRESERVE/STORAGE: CPT

## 2024-05-13 PROCEDURE — 86753 PROTOZOA ANTIBODY NOS: CPT

## 2024-05-13 PROCEDURE — 86335 IMMUNFIX E-PHORSIS/URINE/CSF: CPT | Performed by: PATHOLOGY

## 2024-05-13 PROCEDURE — 99215 OFFICE O/P EST HI 40 MIN: CPT

## 2024-05-13 PROCEDURE — 82785 ASSAY OF IGE: CPT

## 2024-05-13 PROCEDURE — 86900 BLOOD TYPING SEROLOGIC ABO: CPT

## 2024-05-13 PROCEDURE — 86334 IMMUNOFIX E-PHORESIS SERUM: CPT | Performed by: PATHOLOGY

## 2024-05-13 PROCEDURE — 80053 COMPREHEN METABOLIC PANEL: CPT

## 2024-05-13 PROCEDURE — 85025 COMPLETE CBC W/AUTO DIFF WBC: CPT

## 2024-05-13 PROCEDURE — 81001 URINALYSIS AUTO W/SCOPE: CPT

## 2024-05-13 PROCEDURE — 83020 HEMOGLOBIN ELECTROPHORESIS: CPT

## 2024-05-13 PROCEDURE — 84165 PROTEIN E-PHORESIS SERUM: CPT | Mod: 26 | Performed by: PATHOLOGY

## 2024-05-13 ASSESSMENT — PAIN SCALES - GENERAL: PAINLEVEL: NO PAIN (0)

## 2024-05-13 NOTE — LETTER
"    5/13/2024         RE: Inocente Romero  [address] [address]        Dear Colleague,    Thank you for referring your patient, Inocente Romero, to the [facility] BLOOD AND MARROW TRANSPLANT PROGRAM [place]. Please see a copy of my visit note below.    BMT Teaching Flowsheet    Inocente Romero is a 64 year old male  Diagnoses of Vitamin D deficiency, Multiple myeloma, remission status unspecified (H), Examination prior to chemotherapy, and Personal history of diseases of blood and blood-forming organs were pertinent to this visit.    Teaching Topic: Autologous transplant pre work up teach    Person(s) involved in teaching: Patient and Spouse    Motivation Level  Asks Questions: Yes  Eager to Learn: Yes  Cooperative: Yes  Receptive (willing/able to accept information): Yes  Any cultural factors/Temple beliefs that may influence understanding or compliance? No    Patient and Caregiver demonstrates understanding of the following:   Reason for the appointment, diagnosis and treatment plan: Yes  Knowledge of proper use of medications and conditions for which they are ordered (with special attention to potential side effects or drug interactions): Yes  Which situations necessitate calling provider and whom to contact: Yes  Proper use and care of (medical equipment, care aids, etc.) Yes  Pain management techniques: Yes  How and/when to access community resources: Yes    Teaching/ learning concerns addressed: no further concerns addressed, patient encouraged to reach out with any further questions.    Infection Control:  Patient and Caregiver instructed on hand hygiene: Yes  Signs and symptoms of infection taught: Yes    Instructional Materials Used/Given:   Patient was given and reviewed BMT Auto Transplant Teaching Binder, including: medication pamphlets, sample treatment calendars, consents, contact information for \"When to Call for Help\" (including after-hours contact), " post-transplant precautions and guidelines.  Patient was encouraged to call with any additional questions.      Patient was provided with handouts for caring for their central venous catheter (proper hand hygiene, changing end caps, flushing line, changing CVC dressing). Yes    Patient was encouraged to view step-by-step instructional videos for central venous catheter care and report any questions or concerns. Yes      Time spent with patient: 90 minutes.      Again, thank you for allowing me to participate in the care of your patient.        Sincerely,        Ileana Cotton RN

## 2024-05-13 NOTE — NURSING NOTE
BMT CONWAY Frailty assessment completed with patient in clinic.   Patient had no questions and expressed understanding of   what assessment was being done.     EKG was performed today.  Order written by Dr Carlin was completed today. Name and  verified with patient.      Sushila Mancera CMA ([facility])

## 2024-05-13 NOTE — PROGRESS NOTES
"BMT Teaching Flowsheet    Inocente Romero is a 64 year old male  Diagnoses of Vitamin D deficiency, Multiple myeloma, remission status unspecified (H), Examination prior to chemotherapy, and Personal history of diseases of blood and blood-forming organs were pertinent to this visit.    Teaching Topic: Autologous transplant pre work up teach    Person(s) involved in teaching: Patient and Spouse    Motivation Level  Asks Questions: Yes  Eager to Learn: Yes  Cooperative: Yes  Receptive (willing/able to accept information): Yes  Any cultural factors/Mosque beliefs that may influence understanding or compliance? No    Patient and Caregiver demonstrates understanding of the following:   Reason for the appointment, diagnosis and treatment plan: Yes  Knowledge of proper use of medications and conditions for which they are ordered (with special attention to potential side effects or drug interactions): Yes  Which situations necessitate calling provider and whom to contact: Yes  Proper use and care of (medical equipment, care aids, etc.) Yes  Pain management techniques: Yes  How and/when to access community resources: Yes    Teaching/ learning concerns addressed: no further concerns addressed, patient encouraged to reach out with any further questions.    Infection Control:  Patient and Caregiver instructed on hand hygiene: Yes  Signs and symptoms of infection taught: Yes    Instructional Materials Used/Given:   Patient was given and reviewed BMT Auto Transplant Teaching Binder, including: medication pamphlets, sample treatment calendars, consents, contact information for \"When to Call for Help\" (including after-hours contact), post-transplant precautions and guidelines.  Patient was encouraged to call with any additional questions.      Patient was provided with handouts for caring for their central venous catheter (proper hand hygiene, changing end caps, flushing line, changing CVC dressing). Yes    Patient was " encouraged to view step-by-step instructional videos for central venous catheter care and report any questions or concerns. Yes      Time spent with patient: 90 minutes.

## 2024-05-13 NOTE — NURSING NOTE
Chief Complaint   Patient presents with    Blood Draw     Labs drawn with  by RN. Vitals taken. Pt checked into next appointment.       /109 and re-check was 190/109 patient is asymptomatic, just c/o slight headache. Clinic staff and provider notified.    Nlovia Talamantes RN

## 2024-05-13 NOTE — PROGRESS NOTES
[facility]  BMTCT OPEN VISIT    May 13, 2024      Inocente Romero is a 64 year old male undergoing evaluation prior to hematopoietic cell transplant or immune effector cell therapy.    Reason for BMTCT: Multiple myeloma    Recent chemotherapy: Vrd 9th cycle mid April 2024    Recent infections: none    Blood thinner use? If yes, why? No    Treatment for diabetes? No    Today, the patient notes the following symptoms:  Review Of Systems  Skin: negative  Eyes: negative, eyes are often dry and scratchy. He denies any eye pain or vision changes  Ears/Nose/Throat: negative  Respiratory: No shortness of breath, dyspnea on exertion, cough, or hemoptysis  Cardiovascular: he notes occasional palpitations after taking his metoprolol and lisinopril. No dizziness   Gastrointestinal: loose stools 3-4x/week resolve without intervention  Genitourinary: negative  Musculoskeletal: negative  Neurologic: negative  Psychiatric: negative  Hematologic/Lymphatic/Immunologic: negative  Endocrine: negative      Inocente Romero's History      Social History     Tobacco Use    Smoking status: Never    Smokeless tobacco: Never   Substance Use Topics    Alcohol use: Not on file           Inocente Romero's Medications and Allergies    Current Outpatient Medications   Medication Sig Dispense Refill    ACYCLOVIR PO Take 1,600 mg by mouth daily      allopurinol (ZYLOPRIM) 300 MG tablet Take 300 mg by mouth daily      aspirin 81 MG EC tablet Take 81 mg by mouth daily      dexAMETHasone 0.4 MG INST Take 40 mg by mouth once a week      LENalidomide (REVLIMID) 25 MG CAPS capsule Take 25 mg by mouth      lisinopril (ZESTRIL) 10 MG tablet Take 10 mg by mouth daily      magnesium 200 MG TABS Take 200 mg by mouth daily      metoprolol succinate ER (TOPROL XL) 100 MG 24 hr tablet Take 100 mg by mouth daily       No current facility-administered medications for this visit.        No Known Allergies        Physical Examination    There were  no vitals taken for this visit.    Exam:  Constitutional: healthy, alert, and no distress  Head: Normocephalic. No masses, lesions, tenderness or abnormalities  ENT: ENT exam normal, no neck nodes or sinus tenderness  Cardiovascular: negative, PMI normal. No lifts, heaves, or thrills. RRR. No murmurs, clicks gallops or rub  Respiratory: negative, Percussion normal. Good diaphragmatic excursion. Lungs clear  Gastrointestinal: Abdomen soft, non-tender. BS normal. No masses, organomegaly  : Deferred  Musculoskeletal: extremities normal- no gross deformities noted, gait normal, and normal muscle tone  Skin: no suspicious lesions or rashes  Neurologic: Gait normal. Reflexes normal and symmetric. Sensation grossly WNL.  Psychiatric: mentation appears normal and affect normal/bright  Hematologic/Lymphatic/Immunologic: Normal cervical lymph nodes      Frailty Screening  BMT Fried Frailty          4/5/2024    15:39   Fried Frailty   Lost>10 pounds unintenionally last year N   Exhaustion Score 0   Slowness Score 0   Weakness/ Strength Score 0   Low Activity Level Score 1   Final Score Not Frail   Final Score Number 1   Sit Stand Assessment   Patient able to perform 5 chair stands Y   Chair Stands in seconds 7   Patient is able to perform stand with Feet Side by Side? Y   First attempt (in seconds): 10   Patient is able to perform Semi-Tandem Stand? Y   First attemp (in seconds): 10   Patient is able to perform Tandem Stand? Y   First attemp (in seconds): 10         Overall Assessment    I have reviewed the diagnostic data, medications, frailty screening, and general processes prior to BMTCT.  I have notified the Primary BMT Physician/and or Attending Physician in the clinic of any issues. We also discussed in detail the database and biorepository research for which Inocente Romero is eligible. We discussed the potential risks and potential benefits of each of these protocols individually. We explained potential  alternatives to the protocols discussed. We explained to the patient that participation is voluntary and that consent may be withdrawn at any time.       Consents Signed:   Blood transfusion consent form  Ethnicity form  [facility]  [facility] B[facility] Database    Present during the discussion were Felix and his wife. Copies of the signed consent forms will be provided to the patient on admission. No procedures specific to any studies were performed prior to the patient signing the consent form.    Inocente Romero had the opportunity to ask questions, and I answered all of the questions to the best of my ability.      Lillie Umaña PA-C

## 2024-05-13 NOTE — LETTER
5/13/2024         RE: Inocente Romero  [address] [address]        Dear Colleague,    Thank you for referring your patient, Inocente Romero, to the [facility] BLOOD AND MARROW TRANSPLANT PROGRAM [place]. Please see a copy of my visit note below.    [facility]  BMTCT OPEN VISIT    May 13, 2024      Inocente Romero is a 64 year old male undergoing evaluation prior to hematopoietic cell transplant or immune effector cell therapy.    Reason for BMTCT: Multiple myeloma    Recent chemotherapy: Vrd 9th cycle mid April 2024    Recent infections: none    Blood thinner use? If yes, why? No    Treatment for diabetes? No    Today, the patient notes the following symptoms:  Review Of Systems  Skin: negative  Eyes: negative, eyes are often dry and scratchy. He denies any eye pain or vision changes  Ears/Nose/Throat: negative  Respiratory: No shortness of breath, dyspnea on exertion, cough, or hemoptysis  Cardiovascular: he notes occasional palpitations after taking his metoprolol and lisinopril. No dizziness   Gastrointestinal: loose stools 3-4x/week resolve without intervention  Genitourinary: negative  Musculoskeletal: negative  Neurologic: negative  Psychiatric: negative  Hematologic/Lymphatic/Immunologic: negative  Endocrine: negative      Inocente Romero's History      Social History     Tobacco Use    Smoking status: Never    Smokeless tobacco: Never   Substance Use Topics    Alcohol use: Not on file           Inocente Romero's Medications and Allergies    Current Outpatient Medications   Medication Sig Dispense Refill    ACYCLOVIR PO Take 1,600 mg by mouth daily      allopurinol (ZYLOPRIM) 300 MG tablet Take 300 mg by mouth daily      aspirin 81 MG EC tablet Take 81 mg by mouth daily      dexAMETHasone 0.4 MG INST Take 40 mg by mouth once a week      LENalidomide (REVLIMID) 25 MG CAPS capsule Take 25 mg by mouth      lisinopril (ZESTRIL) 10 MG tablet Take 10 mg by  mouth daily      magnesium 200 MG TABS Take 200 mg by mouth daily      metoprolol succinate ER (TOPROL XL) 100 MG 24 hr tablet Take 100 mg by mouth daily       No current facility-administered medications for this visit.        No Known Allergies        Physical Examination    There were no vitals taken for this visit.    Exam:  Constitutional: healthy, alert, and no distress  Head: Normocephalic. No masses, lesions, tenderness or abnormalities  ENT: ENT exam normal, no neck nodes or sinus tenderness  Cardiovascular: negative, PMI normal. No lifts, heaves, or thrills. RRR. No murmurs, clicks gallops or rub  Respiratory: negative, Percussion normal. Good diaphragmatic excursion. Lungs clear  Gastrointestinal: Abdomen soft, non-tender. BS normal. No masses, organomegaly  : Deferred  Musculoskeletal: extremities normal- no gross deformities noted, gait normal, and normal muscle tone  Skin: no suspicious lesions or rashes  Neurologic: Gait normal. Reflexes normal and symmetric. Sensation grossly WNL.  Psychiatric: mentation appears normal and affect normal/bright  Hematologic/Lymphatic/Immunologic: Normal cervical lymph nodes      Frailty Screening  BMT Fried Frailty          4/5/2024    15:39   Fried Frailty   Lost>10 pounds unintenionally last year N   Exhaustion Score 0   Slowness Score 0   Weakness/ Strength Score 0   Low Activity Level Score 1   Final Score Not Frail   Final Score Number 1   Sit Stand Assessment   Patient able to perform 5 chair stands Y   Chair Stands in seconds 7   Patient is able to perform stand with Feet Side by Side? Y   First attempt (in seconds): 10   Patient is able to perform Semi-Tandem Stand? Y   First attemp (in seconds): 10   Patient is able to perform Tandem Stand? Y   First attemp (in seconds): 10         Overall Assessment    I have reviewed the diagnostic data, medications, frailty screening, and general processes prior to BMTCT.  I have notified the Primary BMT  Physician/and or Attending Physician in the clinic of any issues. We also discussed in detail the database and biorepository research for which Inocente Romero is eligible. We discussed the potential risks and potential benefits of each of these protocols individually. We explained potential alternatives to the protocols discussed. We explained to the patient that participation is voluntary and that consent may be withdrawn at any time.       Consents Signed:   Blood transfusion consent form  Ethnicity form  [facility] database  [facility] BMTCT Database    Present during the discussion were Felix and his wife. Copies of the signed consent forms will be provided to the patient on admission. No procedures specific to any studies were performed prior to the patient signing the consent form.    Inocente Romero had the opportunity to ask questions, and I answered all of the questions to the best of my ability.      Lillie Umaña PA-C

## 2024-05-14 ENCOUNTER — APPOINTMENT (OUTPATIENT)
Dept: LAB | Facility: CLINIC | Age: 64
End: 2024-05-14
Attending: NURSE PRACTITIONER
Payer: COMMERCIAL

## 2024-05-14 ENCOUNTER — OFFICE VISIT (OUTPATIENT)
Dept: TRANSPLANT | Facility: CLINIC | Age: 64
End: 2024-05-14
Attending: NURSE PRACTITIONER
Payer: COMMERCIAL

## 2024-05-14 ENCOUNTER — MEDICAL CORRESPONDENCE (OUTPATIENT)
Dept: TRANSPLANT | Facility: CLINIC | Age: 64
End: 2024-05-14

## 2024-05-14 ENCOUNTER — OFFICE VISIT (OUTPATIENT)
Dept: PULMONOLOGY | Facility: CLINIC | Age: 64
End: 2024-05-14
Payer: COMMERCIAL

## 2024-05-14 VITALS
OXYGEN SATURATION: 97 % | RESPIRATION RATE: 16 BRPM | BODY MASS INDEX: 32.26 KG/M2 | WEIGHT: 230.4 LBS | HEART RATE: 64 BPM | DIASTOLIC BLOOD PRESSURE: 83 MMHG | SYSTOLIC BLOOD PRESSURE: 189 MMHG | TEMPERATURE: 97.6 F

## 2024-05-14 DIAGNOSIS — Z86.2 PERSONAL HISTORY OF DISEASES OF BLOOD AND BLOOD-FORMING ORGANS: ICD-10-CM

## 2024-05-14 DIAGNOSIS — C90.00 MULTIPLE MYELOMA, REMISSION STATUS UNSPECIFIED (H): ICD-10-CM

## 2024-05-14 DIAGNOSIS — Z71.9 VISIT FOR COUNSELING: Primary | ICD-10-CM

## 2024-05-14 DIAGNOSIS — C90.00 MULTIPLE MYELOMA, REMISSION STATUS UNSPECIFIED (H): Primary | ICD-10-CM

## 2024-05-14 DIAGNOSIS — E55.9 VITAMIN D DEFICIENCY: ICD-10-CM

## 2024-05-14 DIAGNOSIS — Z01.818 EXAMINATION PRIOR TO CHEMOTHERAPY: ICD-10-CM

## 2024-05-14 LAB
ALBUMIN SERPL ELPH-MCNC: 4.5 G/DL (ref 3.7–5.1)
ALPHA1 GLOB SERPL ELPH-MCNC: 0.2 G/DL (ref 0.2–0.4)
ALPHA2 GLOB SERPL ELPH-MCNC: 0.8 G/DL (ref 0.5–0.9)
ATRIAL RATE - MUSE: 58 BPM
B-GLOBULIN SERPL ELPH-MCNC: 0.7 G/DL (ref 0.6–1)
B2 MICROGLOB TUMOR MARKER SER-MCNC: 1.7 MG/L
BASOPHILS # BLD AUTO: 0 10E3/UL (ref 0–0.2)
BASOPHILS NFR BLD AUTO: 0 %
DIASTOLIC BLOOD PRESSURE - MUSE: NORMAL MMHG
EBV VCA IGG SER IA-ACNC: 218 U/ML
EBV VCA IGG SER IA-ACNC: POSITIVE
EOSINOPHIL # BLD AUTO: 0 10E3/UL (ref 0–0.7)
EOSINOPHIL NFR BLD AUTO: 1 %
ERYTHROCYTE [DISTWIDTH] IN BLOOD BY AUTOMATED COUNT: 14.6 % (ref 10–15)
GAMMA GLOB SERPL ELPH-MCNC: 1 G/DL (ref 0.7–1.6)
HCT VFR BLD AUTO: 38.2 % (ref 40–53)
HGB BLD-MCNC: 13 G/DL (ref 13.3–17.7)
HGB S BLD QL: NEGATIVE
HSV1 IGG SERPL QL IA: 37.5 INDEX
HSV1 IGG SERPL QL IA: ABNORMAL
HSV2 IGG SERPL QL IA: 0.04 INDEX
HSV2 IGG SERPL QL IA: ABNORMAL
IGA SERPL-MCNC: 101 MG/DL (ref 84–499)
IGE SERPL-ACNC: 5 KU/L (ref 0–114)
IGG SERPL-MCNC: 1055 MG/DL (ref 610–1616)
IGM SERPL-MCNC: 21 MG/DL (ref 35–242)
IMM GRANULOCYTES # BLD: 0 10E3/UL
IMM GRANULOCYTES NFR BLD: 0 %
INTERPRETATION ECG - MUSE: NORMAL
KAPPA LC FREE SER-MCNC: 1.4 MG/DL (ref 0.33–1.94)
KAPPA LC FREE/LAMBDA FREE SER NEPH: 2.8 {RATIO} (ref 0.26–1.65)
LAMBDA LC FREE SERPL-MCNC: 0.5 MG/DL (ref 0.57–2.63)
LYMPHOCYTES # BLD AUTO: 1.1 10E3/UL (ref 0.8–5.3)
LYMPHOCYTES NFR BLD AUTO: 22 %
M PROTEIN SERPL ELPH-MCNC: 0.7 G/DL
MCH RBC QN AUTO: 31.9 PG (ref 26.5–33)
MCHC RBC AUTO-ENTMCNC: 34 G/DL (ref 31.5–36.5)
MCV RBC AUTO: 94 FL (ref 78–100)
MONOCYTES # BLD AUTO: 0.6 10E3/UL (ref 0–1.3)
MONOCYTES NFR BLD AUTO: 11 %
NEUTROPHILS # BLD AUTO: 3.3 10E3/UL (ref 1.6–8.3)
NEUTROPHILS NFR BLD AUTO: 66 %
NRBC # BLD AUTO: 0 10E3/UL
NRBC BLD AUTO-RTO: 0 /100
P AXIS - MUSE: 43 DEGREES
PLATELET # BLD AUTO: 205 10E3/UL (ref 150–450)
PR INTERVAL - MUSE: 192 MS
PROT ELPH PNL UR ELPH: NORMAL
PROT PATTERN SERPL ELPH-IMP: ABNORMAL
PROT PATTERN SERPL IFE-IMP: NORMAL
QRS DURATION - MUSE: 114 MS
QT - MUSE: 484 MS
QTC - MUSE: 475 MS
R AXIS - MUSE: -35 DEGREES
RBC # BLD AUTO: 4.08 10E6/UL (ref 4.4–5.9)
SYSTOLIC BLOOD PRESSURE - MUSE: NORMAL MMHG
T AXIS - MUSE: 67 DEGREES
T GONDII IGG SER-ACNC: <3 IU/ML
T GONDII IGM SER-ACNC: <3 AU/ML
VENTRICULAR RATE- MUSE: 58 BPM
WBC # BLD AUTO: 5 10E3/UL (ref 4–11)

## 2024-05-14 PROCEDURE — 88369 M/PHMTRC ALYSISHQUANT/SEMIQ: CPT | Mod: 26 | Performed by: MEDICAL GENETICS

## 2024-05-14 PROCEDURE — 85025 COMPLETE CBC W/AUTO DIFF WBC: CPT

## 2024-05-14 PROCEDURE — 88291 CYTO/MOLECULAR REPORT: CPT | Performed by: MEDICAL GENETICS

## 2024-05-14 PROCEDURE — 88185 FLOWCYTOMETRY/TC ADD-ON: CPT

## 2024-05-14 PROCEDURE — 38222 DX BONE MARROW BX & ASPIR: CPT | Mod: LT | Performed by: NURSE PRACTITIONER

## 2024-05-14 PROCEDURE — 88342 IMHCHEM/IMCYTCHM 1ST ANTB: CPT | Mod: 26 | Performed by: STUDENT IN AN ORGANIZED HEALTH CARE EDUCATION/TRAINING PROGRAM

## 2024-05-14 PROCEDURE — 94726 PLETHYSMOGRAPHY LUNG VOLUMES: CPT | Performed by: INTERNAL MEDICINE

## 2024-05-14 PROCEDURE — 38222 DX BONE MARROW BX & ASPIR: CPT | Performed by: NURSE PRACTITIONER

## 2024-05-14 PROCEDURE — 94729 DIFFUSING CAPACITY: CPT | Performed by: INTERNAL MEDICINE

## 2024-05-14 PROCEDURE — 88341 IMHCHEM/IMCYTCHM EA ADD ANTB: CPT | Mod: 26 | Performed by: STUDENT IN AN ORGANIZED HEALTH CARE EDUCATION/TRAINING PROGRAM

## 2024-05-14 PROCEDURE — 94375 RESPIRATORY FLOW VOLUME LOOP: CPT | Performed by: INTERNAL MEDICINE

## 2024-05-14 PROCEDURE — 88368 INSITU HYBRIDIZATION MANUAL: CPT | Mod: 26 | Performed by: MEDICAL GENETICS

## 2024-05-14 PROCEDURE — 88311 DECALCIFY TISSUE: CPT | Mod: TC

## 2024-05-14 PROCEDURE — 88271 CYTOGENETICS DNA PROBE: CPT

## 2024-05-14 PROCEDURE — 88311 DECALCIFY TISSUE: CPT | Mod: 26 | Performed by: STUDENT IN AN ORGANIZED HEALTH CARE EDUCATION/TRAINING PROGRAM

## 2024-05-14 PROCEDURE — 88313 SPECIAL STAINS GROUP 2: CPT | Mod: 26 | Performed by: STUDENT IN AN ORGANIZED HEALTH CARE EDUCATION/TRAINING PROGRAM

## 2024-05-14 PROCEDURE — 36415 COLL VENOUS BLD VENIPUNCTURE: CPT

## 2024-05-14 PROCEDURE — 88237 TISSUE CULTURE BONE MARROW: CPT

## 2024-05-14 PROCEDURE — 85097 BONE MARROW INTERPRETATION: CPT | Mod: GC | Performed by: STUDENT IN AN ORGANIZED HEALTH CARE EDUCATION/TRAINING PROGRAM

## 2024-05-14 PROCEDURE — 88342 IMHCHEM/IMCYTCHM 1ST ANTB: CPT | Mod: TC,XU

## 2024-05-14 PROCEDURE — 88184 FLOWCYTOMETRY/ TC 1 MARKER: CPT | Performed by: PATHOLOGY

## 2024-05-14 PROCEDURE — 88305 TISSUE EXAM BY PATHOLOGIST: CPT | Mod: 26 | Performed by: STUDENT IN AN ORGANIZED HEALTH CARE EDUCATION/TRAINING PROGRAM

## 2024-05-14 PROCEDURE — 88188 FLOWCYTOMETRY/READ 9-15: CPT | Mod: GC | Performed by: PATHOLOGY

## 2024-05-14 RX ORDER — LISINOPRIL 20 MG/1
20 TABLET ORAL EVERY MORNING
COMMUNITY

## 2024-05-14 RX ORDER — POTASSIUM CHLORIDE 750 MG/1
10 TABLET, EXTENDED RELEASE ORAL 2 TIMES DAILY
COMMUNITY

## 2024-05-14 ASSESSMENT — PATIENT HEALTH QUESTIONNAIRE - PHQ9
5. POOR APPETITE OR OVEREATING: NOT AT ALL
SUM OF ALL RESPONSES TO PHQ QUESTIONS 1-9: 0

## 2024-05-14 ASSESSMENT — ANXIETY QUESTIONNAIRES
2. NOT BEING ABLE TO STOP OR CONTROL WORRYING: NOT AT ALL
7. FEELING AFRAID AS IF SOMETHING AWFUL MIGHT HAPPEN: NOT AT ALL
GAD7 TOTAL SCORE: 0
GAD7 TOTAL SCORE: 0
1. FEELING NERVOUS, ANXIOUS, OR ON EDGE: NOT AT ALL
6. BECOMING EASILY ANNOYED OR IRRITABLE: NOT AT ALL
3. WORRYING TOO MUCH ABOUT DIFFERENT THINGS: NOT AT ALL
5. BEING SO RESTLESS THAT IT IS HARD TO SIT STILL: NOT AT ALL

## 2024-05-14 ASSESSMENT — PAIN SCALES - GENERAL: PAINLEVEL: NO PAIN (0)

## 2024-05-14 NOTE — NURSING NOTE
"Oncology Rooming Note    May 14, 2024 2:00 PM   Inocente Romero is a 64 year old male who presents for:    Chief Complaint   Patient presents with    Blood Draw     Vitals taken, venipuncture labs drawn, checked into next appt    Bone Marrow Biopsy     Bmbx; hx MM     Initial Vitals: BP (!) 189/83 (BP Location: Left arm, Patient Position: Sitting, Cuff Size: Adult Large)   Pulse 64   Temp 97.6  F (36.4  C) (Oral)   Resp 16   Wt 104.5 kg (230 lb 6.4 oz)   SpO2 97%   BMI 32.26 kg/m   Estimated body mass index is 32.26 kg/m  as calculated from the following:    Height as of 4/5/24: 1.8 m (5' 10.87\").    Weight as of this encounter: 104.5 kg (230 lb 6.4 oz). Body surface area is 2.29 meters squared.  No Pain (0) Comment: Data Unavailable   No LMP for male patient.  Allergies reviewed: Yes  Medications reviewed: Yes    Medications: Medication refills not needed today.  Pharmacy name entered into EPIC: Data Unavailable    Frailty Screening:   Is the patient here for a new oncology consult visit in cancer care? 2. No      Clinical concerns: Hypertensive 189/83.       Adriana Mason RN              "

## 2024-05-14 NOTE — LETTER
5/14/2024         RE: Inocente Romero  [address]  [place] [address]        Dear Colleague,    Thank you for referring your patient, Inocente Romero, to the [facility] BLOOD AND MARROW TRANSPLANT PROGRAM [place]. Please see a copy of my visit note below.    BMT ONC Adult Bone Marrow Biopsy Procedure Note  May 14, 2024  BP (!) 189/83 (BP Location: Left arm, Patient Position: Sitting, Cuff Size: Adult Large)   Pulse 64   Temp 97.6  F (36.4  C) (Oral)   Resp 16   Wt 104.5 kg (230 lb 6.4 oz)   SpO2 97%   BMI 32.26 kg/m       Learning needs assessment complete within 12 months? NO    DIAGNOSIS: MM    PROCEDURE: Unilateral Bone Marrow Biopsy and Unilateral Aspirate    LOCATION: [facility] [place] [place] 2nd Floor    Patient s identification was positively verified by verbal identification and invasive procedure safety checklist was completed. Informed consent was obtained. Following the administration of  none  as pre-medication, patient was placed in the prone position and prepped and draped in a sterile manner. Approximately 15 cc of 1% Lidocaine was used over the left posterior iliac spine. Following this a 3 mm incision was made. Trephine bone marrow core(s) was (were) obtained from the [facility]. Bone marrow aspirates were obtained from the IC. Aspirates were sent for morphology, immunophenotyping, cytogenetics, molecular diagnostics, and clonoseq. A total of approximately 30 ml of marrow was aspirated. Following this procedure a sterile dressing was applied to the bone marrow biopsy site(s). The patient was placed in the supine position to maintain pressure on the biopsy site. Post-procedure wound care instructions were given.     Complications: NO     Interventions: NO    Length of procedure:21 minutes to 45 minutes    Procedure performed by: Jodi Herring NP

## 2024-05-14 NOTE — NURSING NOTE
Chief Complaint   Patient presents with    Blood Draw     Vitals taken, venipuncture labs drawn, checked into next appt     BP (!) 189/83 (BP Location: Left arm, Patient Position: Sitting, Cuff Size: Adult Large)   Pulse 64   Temp 97.6  F (36.4  C) (Oral)   Resp 16   Wt 104.5 kg (230 lb 6.4 oz)   SpO2 97%   BMI 32.26 kg/m    José Luis Villagomez RN on 5/14/2024 at 1:33 PM

## 2024-05-14 NOTE — LETTER
"    5/14/2024         RE: Inocente Rmoero  [address] [address]        Dear Colleague,    Thank you for referring your patient, Inocente Romero, to the [facility] BLOOD AND MARROW TRANSPLANT PROGRAM [place]. Please see a copy of my visit note below.    Pharmacy Assessment - Pre-Stem Cell Transplant    Assessments & Recommendations:  1) If ASA needed for history of afib, recommend to hold when plt<50k.   2) Not on allopurinol for gout, recommend to stop 7 days after melphalan per standard.   3) Ice therapy around melphalan.     If this patient is admitted under observation, the patient may bring in their own supply of the following medication for use in the hospital:  1) none  -Per \"Medications Not Supplied by Pharmacy\" policy (available on [employer])    History of Present Illness:  Inocente Romero is a 64 year old year old male diagnosed with multiple myeloma.  he has been treated with RVd.  he is now being work up for Autologous Stem Cell Transplant on protocol 2016-35, which utilizes melphalan as a conditioning regimen.    Pertinent labs/tests:  Viral Testing:  HSV(+) / EBV(+)  Ejection Fraction: pending  QTc: 475msec (5/13/24)    Weights:   Wt Readings from Last 3 Encounters:   05/13/24 105 kg (231 lb 6.4 oz)   05/13/24 104.6 kg (230 lb 8 oz)   04/05/24 103.6 kg (228 lb 6.3 oz)   Ideal body weight: 75 kg (165 lb 5.2 oz)  Adjusted ideal body weight: 87 kg (191 lb 12.1 oz)  % IBW:  140%  There is no height or weight on file to calculate BMI.    Primary BMT Physician: Dr Carlin  BMT RN Coordinator:  Ileana Cotton    Past Medical History:  No past medical history on file.    Medication Allergies:  No Known Allergies    Current Medications (pre-admit):  Current Outpatient Medications   Medication Sig Dispense Refill    acyclovir (ZOVIRAX) 800 MG tablet Take 800 mg by mouth 2 times daily      allopurinol (ZYLOPRIM) 300 MG tablet Take 300 mg by mouth daily      aspirin 81 MG EC " tablet Take 81 mg by mouth daily      lisinopril (ZESTRIL) 10 MG tablet Take 10 mg by mouth every evening      lisinopril (ZESTRIL) 20 MG tablet Take 20 mg by mouth every morning      magnesium 200 MG TABS Take 200 mg by mouth daily      metoprolol succinate ER (TOPROL XL) 100 MG 24 hr tablet Take 100 mg by mouth every evening      potassium chloride ER (K-TAB/KLOR-CON) 10 MEQ CR tablet Take 10 mEq by mouth 2 times daily         Herbal Medication/Nutritional Supplements: magnesium, potassium      Smoking/Past Drug Use: didn't discuss      Nausea/Vomiting, Pain, or other issues: no issues noted      Summary:  I met with Inocente Romero for approximately 30 minutes.  We discussed allergies, home medications, filgrastim priming, preparative regimen (melphalan), anti-infective prophylaxis (acyclovir, levofloxacin, fluconazole, Bactrim), supportive medications (allopurinol, mucositis management, antiemetics), vaccines, med box/pharmacy expectations.     Ollie Mclean, [facility]

## 2024-05-14 NOTE — NURSING NOTE
BMBX Teaching and Assessment       Teaching concerns addressed: Bone marrow biopsy and infection prevention.     Person(s) involved in teaching: Patient  Motivation Level  Asks Questions: Yes  Eager to Learn: Yes  Cooperative: Yes  Receptive (willing/able to accept information): Yes    Patient demonstrates understanding of the following:     Reason for the appointment, diagnosis and treatment plan: Yes  Knowledge of proper use of medications and conditions for which they are ordered (with special attention to potential side effects or drug interactions): Yes  Which situations necessitate calling provider and whom to contact: Yes    Teaching concerns addressed:   Reviewed activity restrictions if received premeds, potential for bleeding and actions to take if develops any of the issues below    Pain management techniques: Yes  Patient instructed on hand hygiene: Yes  How and/when to access community resources: Yes    Infection Control:  Patient demonstrates understanding of the following:   Bone marrow procedure site care taught: Yes  Signs and symptoms of infection taught: Yes       Instructional Materials Used/Given: Pt instructed to keep bmbx site clean and dry for 24hrs. Pt educated to monitor site for signs of infection such as redness, rash, oozing, purulent drainage, bleeding, pain, and elevated temp. Pt instructed to go to call the [facility] triage line or go to the ER if any signs of infection should occur. Pt educated to not operate machinery if receiving versed. Pt verbalized understanding.       Pre-procedure labs drawn via venipuncture in lab. VSS- pt continues to be hypertensive. Meds and allergies reviewed.     Post procedure: Patient ambulatory and site is clean, dry and intact prior to discharge.       Adriana Mason RN

## 2024-05-14 NOTE — PROGRESS NOTES
"Pharmacy Assessment - Pre-Stem Cell Transplant    Assessments & Recommendations:  1) If ASA needed for history of afib, recommend to hold when plt<50k.   2) Not on allopurinol for gout, recommend to stop 7 days after melphalan per standard.   3) Ice therapy around melphalan.     If this patient is admitted under observation, the patient may bring in their own supply of the following medication for use in the hospital:  1) none  -Per \"Medications Not Supplied by Pharmacy\" policy (available on [employer])    History of Present Illness:  Inocente Romero is a 64 year old year old male diagnosed with multiple myeloma.  he has been treated with RVd.  he is now being work up for Autologous Stem Cell Transplant on protocol 2016-35, which utilizes melphalan as a conditioning regimen.    Pertinent labs/tests:  Viral Testing:  HSV(+) / EBV(+)  Ejection Fraction: pending  QTc: 475msec (5/13/24)    Weights:   Wt Readings from Last 3 Encounters:   05/13/24 105 kg (231 lb 6.4 oz)   05/13/24 104.6 kg (230 lb 8 oz)   04/05/24 103.6 kg (228 lb 6.3 oz)   Ideal body weight: 75 kg (165 lb 5.2 oz)  Adjusted ideal body weight: 87 kg (191 lb 12.1 oz)  % IBW:  140%  There is no height or weight on file to calculate BMI.    Primary BMT Physician: Dr Carlin  BMT RN Coordinator:  Ileana Cotton    Past Medical History:  No past medical history on file.    Medication Allergies:  No Known Allergies    Current Medications (pre-admit):  Current Outpatient Medications   Medication Sig Dispense Refill    acyclovir (ZOVIRAX) 800 MG tablet Take 800 mg by mouth 2 times daily      allopurinol (ZYLOPRIM) 300 MG tablet Take 300 mg by mouth daily      aspirin 81 MG EC tablet Take 81 mg by mouth daily      lisinopril (ZESTRIL) 10 MG tablet Take 10 mg by mouth every evening      lisinopril (ZESTRIL) 20 MG tablet Take 20 mg by mouth every morning      magnesium 200 MG TABS Take 200 mg by mouth daily      metoprolol succinate ER (TOPROL XL) 100 MG 24 hr " tablet Take 100 mg by mouth every evening      potassium chloride ER (K-TAB/KLOR-CON) 10 MEQ CR tablet Take 10 mEq by mouth 2 times daily         Herbal Medication/Nutritional Supplements: magnesium, potassium      Smoking/Past Drug Use: didn't discuss      Nausea/Vomiting, Pain, or other issues: no issues noted      Summary:  I met with Inocente Romero for approximately 30 minutes.  We discussed allergies, home medications, filgrastim priming, preparative regimen (melphalan), anti-infective prophylaxis (acyclovir, levofloxacin, fluconazole, Bactrim), supportive medications (allopurinol, mucositis management, antiemetics), vaccines, med box/pharmacy expectations.     Ollie Mclean, [facility]

## 2024-05-14 NOTE — PROGRESS NOTES
BMT ONC Adult Bone Marrow Biopsy Procedure Note  May 14, 2024  BP (!) 189/83 (BP Location: Left arm, Patient Position: Sitting, Cuff Size: Adult Large)   Pulse 64   Temp 97.6  F (36.4  C) (Oral)   Resp 16   Wt 104.5 kg (230 lb 6.4 oz)   SpO2 97%   BMI 32.26 kg/m       Learning needs assessment complete within 12 months? NO    DIAGNOSIS: MM    PROCEDURE: Unilateral Bone Marrow Biopsy and Unilateral Aspirate    LOCATION: [facility] [place] 2nd [facility]    Patient s identification was positively verified by verbal identification and invasive procedure safety checklist was completed. Informed consent was obtained. Following the administration of  none  as pre-medication, patient was placed in the prone position and prepped and draped in a sterile manner. Approximately 15 cc of 1% Lidocaine was used over the left posterior iliac spine. Following this a 3 mm incision was made. Trephine bone marrow core(s) was (were) obtained from the [facility]. Bone marrow aspirates were obtained from the [facility]. Aspirates were sent for morphology, immunophenotyping, cytogenetics, molecular diagnostics, and clonoseq. A total of approximately 30 ml of marrow was aspirated. Following this procedure a sterile dressing was applied to the bone marrow biopsy site(s). The patient was placed in the supine position to maintain pressure on the biopsy site. Post-procedure wound care instructions were given.     Complications: NO     Interventions: NO    Length of procedure:21 minutes to 45 minutes    Procedure performed by: Jodi Herring NP

## 2024-05-14 NOTE — PROGRESS NOTES
Blood and Marrow Transplant   Psychosocial Assessment with   Clinical     Assessment completed on 5/14/2024 of Pt's living situation, support system, financial status, functional status, coping, stressors, need for resources and social work intervention provided as needed.  Information for this assessment was provided by Pt and Pts Spouse Comfort report in addition to medical chart review and consultation with medical team.     Present at Assessment:   Patient: Felix Romero  Spouse: Comfort Romero  : ANALI Thurman LGSW    Diagnosis: Multiple Myeloma (MM)     Date of Diagnosis: 8/2023    Transplant type: OP Autologous    Donor: Autologous     Physician: Gianfranco Carlin MD    Nurse Coordinator: Ileana Cotton RN    Social Workers: ANALI Thurman LGSW    Permanent Address:   [address] [place] [address]  (35 miles from clinic/hospital)    Living Situation: Pt lives in [place], [place] with spouse Comfort and 2 children (Song-13 and Charli-9)    Local Address:   [address] [place] [address]  (35 miles from clinic/hospital)    Contact Information:  Pt's Cell Phone: [phone]  Pt Email: [email or web address]  Pt's Spouse Comfort's Phone: [phone]  Pt's Spouse Comfort's Email: [email or web address]    Of Note: Pt requests any email communication to be sent to both his and spouse's email.     Presenting Information:  Felix is a 64 year old male diagnosed with Multiple Myeloma who presents for evaluation for OP Autologous transplant at the [facility] Pt was accompanied to today's visit by Spouse Comfort.     Decision Making: Self    Health Care Directive: Pt declined a completed Health Care Directive (HCD) at this time. SW previously provided pt with a copy of HCD and encouraged Pt to have discussion with family members and complete form. SW previously provided education and forms. SW encouraged pt to have  discussions with their family regarding their health care wishes. SW explained that since he does not have a healthcare directive, legally pts spouse would make decisions on pts behalf, if he did not have capacity to make his own medical decisions. Pt understood. Pts Spouse Comfort shared that they are in the process of completing the health care directive and will plan to bring the document into the clinic when completed.     Relationship Status: . Pt and pt's spouse have been  for 12 years. Pt described relationship as stable/supportive.     Special Needs: None identified at this time.     Family/Support System: Pt endorsed a strong support system including family and close friends who will be available to support pt throughout transplant process.     Family Information:  Spouse: Comfort Romero  Children: Malik-37 ([facility] [place]); Constantin-31 ([place], [place]); Song-13; Charli-9   Siblings: Sister Abiola Sharp-61 ([place], [place]) and Sister Rosibel-54 ([place])   Parents: Mother Mary Jane and Father Inocente (travel to/from [place] 6 months out of the year)   Grandchildren: N/A  Friends: Pt endorsed a good friend support system.    Caregiver: SW discussed with pt and pt's spouse the caregiver role and expectation at length. Pt is agreeable to having a full time caregiver for a minimum of 30 days until cleared by the BMT physician. Pt and pt's spouse confirmed understanding of the caregiver requirement. Pt's primary caregiver will be Spouse Comfort. Pt reviewed and signed the caregiver contract which will be scanned into the EMR. Caregiver education and resources provided. No caregiver concerns identified.     Caregiver Contact Information:  Pt's Spouse Comfort's Phone: [phone]  Pt's Spouse Comfort's Email: [email or web address]    Transportation Mode: Private vehicle to be primary source of transportation anticipated. Pt is aware of driving restrictions post-BMT and the need for the caregiver is to drive until  cleared to drive by the BMT physician. SW provided information on parking info and monthly parking pass options.    Insurance: Pt has [facility] health insurance. Pt denied specific insurance concerns at this time. HAMIDA reiterated information about the [employer]  should specific insurance questions arise as pt moves through transplant process.     Of Note: Pt served from [address]20[address]67 in the [employer]-stationed in [place] and [facility]. Pt shared that [facility] and is currently in the process to increase the percentage of benefits. Pt/Spouse shared that the [facility] can provide transportation reimbursement for parking related costs.      Sources of Income: Pt's source of income is SSDI and salary/wages from spouse's employment. No financial concerns identified at this time. HAMIDA discussed carline options and asked pt to let SW know if they would like to apply in the future.     Of Note: Pt received his first SSDI benefit check last month in the amount of $902.  Pt also shared that he receives approximately $300 for financial assistance for his children via SSDI.     Employment:   Currently employed: No  Previous Employer: [employer]  Occupation: [employer]      Spouse/Partner Employed: Yes-F/T  Occupation: Human Resources  *Can work remotely-place of employment is approximately a mile from residence.     Mental Health: Pt denied a history of mental health concerns, specific diagnoses or medications at this time. We discussed how many patients may see an increase in feelings of anxiety or depression while hospitalized for extended periods of time and pursue  isolation precautions post-BMT. Encouraged pt and spouse to let us know if they are noticing an increase in symptoms. Pt believes he would be able to identify symptoms of depression/anxiety throughout the transplant process. We talked about the variety of modalities available to use as coping  "mechanisms (including but not limited to guided imagery, relaxation techniques, progressive muscle relaxation, counseling/talk therapy and medication).    PHQ-9:  Pt scored a 0 which indicates none on the depression severity scale. Pt endorses this is an accurate reflection of his emotional state.    GAD7:  Pt scored a 0 which indicates no sign of anxiety on the Generalized Anxiety Disorder Questionnaire. Pt endorses this is an accurate reflection of his emotional state.    Chemical Use:   Tobacco: No hx reported.  Alcohol: No hx reported.  Marijuana: No hx reported.  Other Drugs: No hx reported.  Based on the information provided, there appear to be no specific risks or concerns identified at this time.     Trauma/Loss/Abuse History: Multiple losses associated with cancer diagnosis and treatment, including health, employment, changes to physical appearance, etc.     Spirituality: Pt identified as Bahai. However, is not currently an active participant. SW explained that there are Chaplains on the unit and pt can request to meet with a  at anytime.     Coping: Pt noted that he is currently feeling \"positive and hopeful\". Pt shared that his main coping mechanisms are \"talking with friends/family and watching videos\". Pt noted that he enjoys \"playing tennis, watching sports, watching movies, travel, listening to the radio, and play with his children\". SW and pt discussed additional positive coping mechanisms that pt can utilize while in the hospital. While hospitalized, pt plans to \"bring his laptop\".     Caregiver Coping: Pt's spouse noted that she is feeling \"positive, fearful, sad, hopeful, worried, prepared, nervous, frustrated, ready to begin, and less interested in usual activities\" at this time. Pt's spouse noted that she jonny by \"talking with friends/family, prayer/spiritual practices, exercise, smoking, and gathering education information about cancer diagnosis. Caregiver resources " offered/reviewed.     Education Provided: Transplant process expectations, Caregiver requirements, Caregiver self-care, Financial issues related to transplant, Financial resources/grants available, Common psychosocial stressors pre/post transplant, Support group(s) available, Hospital resources available, Web site information, Social Work role and Resources for children/siblings    Interventions Provided: Psychosocial Support and Education     Assessment and Recommendations for Team:  Pt is a 64 year old male diagnosed with MM who is here undergoing preparation for a planned OP Autologous transplant.     Pt is engaged and pleasant, calm and able to articulate concerns/coping mechanisms in an appropriate manner. During our meeting pt was alert, he was interactive, affect was full, he displayed appropriate eye contact, memory and thought processes. Pt feels comfortable communicating with the medical team. Pt has a strong supportive network of family and friends who are involved. Pt has developed strong coping mechanisms.     Pt will benefit from ongoing psychosocial support in regards to coping with the adjustment to the BMT process. CSW has discussed  psychosocial support options in regards to coping with the adjustment to the BMT process and support groups opportunities.      Pt has a strong support system and a confirmed caregiver plan. Pt verbalizes understanding of the transplant process and wanting to proceed. SW provided contact information and encouraged pt to contact SW with questions, concerns, resources and for support.    Per this assessment, SW did not identify any barriers to this patient moving forward with transplant.    Important Information:   -If hospitalized for an extended period of time, pt would appreciate a treadmill in his room.    Follow up Planned:   Psychosocial support    ANALI Thurman, [facility]  Adult Blood & Marrow Transplant   Phone: [phone]  VOCERA SEARCHABLE: BMT  SW #4  Securely message with Vocera   Support Groups at [facility]: Social Work Services for Cancer Patients ([email or web address])

## 2024-05-15 LAB
DLCOCOR-%PRED-PRE: 113 %
DLCOCOR-PRE: 31.7 ML/MIN/MMHG
DLCOUNC-%PRED-PRE: 109 %
DLCOUNC-PRE: 30.57 ML/MIN/MMHG
DLCOUNC-PRED: 27.89 ML/MIN/MMHG
ERV-%PRED-PRE: 65 %
ERV-PRE: 1.05 L
ERV-PRED: 1.6 L
EXPTIME-PRE: 6.06 SEC
FEF2575-%PRED-PRE: 106 %
FEF2575-PRE: 2.94 L/SEC
FEF2575-PRED: 2.77 L/SEC
FEFMAX-%PRED-PRE: 110 %
FEFMAX-PRE: 10.2 L/SEC
FEFMAX-PRED: 9.25 L/SEC
FEV1-%PRED-PRE: 109 %
FEV1-PRE: 3.67 L
FEV1FEV6-PRE: 77 %
FEV1FEV6-PRED: 79 %
FEV1FVC-PRE: 76 %
FEV1FVC-PRED: 78 %
FEV1SVC-PRE: 78 %
FEV1SVC-PRED: 74 %
FIFMAX-PRE: 6.82 L/SEC
FRCPLETH-%PRED-PRE: 93 %
FRCPLETH-PRE: 3.44 L
FRCPLETH-PRED: 3.68 L
FVC-%PRED-PRE: 111 %
FVC-PRE: 4.81 L
FVC-PRED: 4.32 L
IC-%PRED-PRE: 114 %
IC-PRE: 3.65 L
IC-PRED: 3.2 L
IGD SER-MCNC: <1.3 MG/DL
PATH REPORT.COMMENTS IMP SPEC: ABNORMAL
PATH REPORT.COMMENTS IMP SPEC: YES
PATH REPORT.FINAL DX SPEC: ABNORMAL
PATH REPORT.MICROSCOPIC SPEC OTHER STN: ABNORMAL
PATH REPORT.RELEVANT HX SPEC: ABNORMAL
RVPLETH-%PRED-PRE: 96 %
RVPLETH-PRE: 2.4 L
RVPLETH-PRED: 2.5 L
TLCPLETH-%PRED-PRE: 97 %
TLCPLETH-PRE: 7.09 L
TLCPLETH-PRED: 7.3 L
VA-%PRED-PRE: 102 %
VA-PRE: 6.89 L
VC-%PRED-PRE: 104 %
VC-PRE: 4.7 L
VC-PRED: 4.51 L

## 2024-05-16 ENCOUNTER — HOSPITAL ENCOUNTER (OUTPATIENT)
Dept: GENERAL RADIOLOGY | Facility: CLINIC | Age: 64
Discharge: HOME | End: 2024-05-16
Attending: INTERNAL MEDICINE
Payer: COMMERCIAL

## 2024-05-16 ENCOUNTER — HOSPITAL ENCOUNTER (OUTPATIENT)
Dept: CARDIOLOGY | Facility: CLINIC | Age: 64
Discharge: HOME | End: 2024-05-16
Attending: INTERNAL MEDICINE
Payer: COMMERCIAL

## 2024-05-16 ENCOUNTER — HOSPITAL ENCOUNTER (OUTPATIENT)
Dept: LAB | Facility: CLINIC | Age: 64
Discharge: HOME | End: 2024-05-16
Attending: INTERNAL MEDICINE | Admitting: INTERNAL MEDICINE
Payer: COMMERCIAL

## 2024-05-16 ENCOUNTER — LAB (OUTPATIENT)
Dept: LAB | Facility: CLINIC | Age: 64
End: 2024-05-16
Payer: COMMERCIAL

## 2024-05-16 ENCOUNTER — HOSPITAL ENCOUNTER (OUTPATIENT)
Dept: PET IMAGING | Facility: CLINIC | Age: 64
Discharge: HOME | End: 2024-05-16
Attending: INTERNAL MEDICINE
Payer: COMMERCIAL

## 2024-05-16 VITALS
RESPIRATION RATE: 16 BRPM | WEIGHT: 230.6 LBS | TEMPERATURE: 97.6 F | BODY MASS INDEX: 32.28 KG/M2 | HEIGHT: 71 IN | HEART RATE: 72 BPM | DIASTOLIC BLOOD PRESSURE: 97 MMHG | SYSTOLIC BLOOD PRESSURE: 182 MMHG

## 2024-05-16 DIAGNOSIS — C90.00 MULTIPLE MYELOMA, REMISSION STATUS UNSPECIFIED (H): ICD-10-CM

## 2024-05-16 DIAGNOSIS — Z01.818 EXAMINATION PRIOR TO CHEMOTHERAPY: ICD-10-CM

## 2024-05-16 DIAGNOSIS — Z86.2 PERSONAL HISTORY OF DISEASES OF BLOOD AND BLOOD-FORMING ORGANS: ICD-10-CM

## 2024-05-16 DIAGNOSIS — E55.9 VITAMIN D DEFICIENCY: ICD-10-CM

## 2024-05-16 LAB
DONOR CYTOMEGALOVIRUS ABY: NEGATIVE
DONOR HEP B CORE ABY: NORMAL
DONOR HEP B SURF AGN: NORMAL
DONOR HEPATITIS C ABY: NORMAL
DONOR HTLV 1&2 ANTIBODY: NORMAL
DONOR TREPONEMA PAL ABY: NORMAL
HBV DNA SERPL QL NAA+PROBE: NORMAL
HCV RNA SERPL QL NAA+PROBE: NORMAL
HIV1+2 AB SERPL QL IA: NORMAL
HIV1+2 RNA SERPL QL NAA+PROBE: NORMAL
LVEF ECHO: NORMAL
PATH REPORT.COMMENTS IMP SPEC: ABNORMAL
PATH REPORT.COMMENTS IMP SPEC: YES
PATH REPORT.COMMENTS IMP SPEC: YES
PATH REPORT.FINAL DX SPEC: ABNORMAL
PATH REPORT.FINAL DX SPEC: ABNORMAL
PATH REPORT.GROSS SPEC: ABNORMAL
PATH REPORT.GROSS SPEC: ABNORMAL
PATH REPORT.MICROSCOPIC SPEC OTHER STN: ABNORMAL
PATH REPORT.RELEVANT HX SPEC: ABNORMAL
PATH REPORT.SITE OF ORIGIN SPEC: ABNORMAL
TRYPANOSOMA CRUZI: NORMAL
WNV RNA SERPL DONR QL NAA+PROBE: NORMAL

## 2024-05-16 PROCEDURE — 343N000001 HC RX 343: Performed by: INTERNAL MEDICINE

## 2024-05-16 PROCEDURE — 93306 TTE W/DOPPLER COMPLETE: CPT

## 2024-05-16 PROCEDURE — 78816 PET IMAGE W/CT FULL BODY: CPT | Mod: PS

## 2024-05-16 PROCEDURE — 71046 X-RAY EXAM CHEST 2 VIEWS: CPT

## 2024-05-16 PROCEDURE — 71046 X-RAY EXAM CHEST 2 VIEWS: CPT | Mod: 26 | Performed by: RADIOLOGY

## 2024-05-16 PROCEDURE — 78816 PET IMAGE W/CT FULL BODY: CPT | Mod: 26 | Performed by: RADIOLOGY

## 2024-05-16 PROCEDURE — 93306 TTE W/DOPPLER COMPLETE: CPT | Mod: 26 | Performed by: INTERNAL MEDICINE

## 2024-05-16 PROCEDURE — G0463 HOSPITAL OUTPT CLINIC VISIT: HCPCS

## 2024-05-16 PROCEDURE — A9552 F18 FDG: HCPCS | Performed by: INTERNAL MEDICINE

## 2024-05-16 RX ORDER — FLUDEOXYGLUCOSE F 18 200 MCI/ML
10-18 INJECTION, SOLUTION INTRAVENOUS ONCE
Status: COMPLETED | OUTPATIENT
Start: 2024-05-16 | End: 2024-05-16

## 2024-05-16 RX ADMIN — FLUDEOXYGLUCOSE F 18 13.79 MILLICURIE: 200 INJECTION, SOLUTION INTRAVENOUS at 09:46

## 2024-05-16 NOTE — CONSULTS
Transfusion Medicine Consultation    Inocente Romero [id number]   YOB: 1960 Age: 64 year old   Date of Consult: 5/16/2024     Reason for consult: Autologous Hematopoietic Progenitor Cell (HPC)  Collection           Assessment and Plan:   64 year old male presents for consultation for autologous HPC collection for autologous PBSCT for multiple myeloma.  The plan is to collect for 1 to 3 days or until the target goal is met.   A central line will be placed and will be used for access for the procedure.        Chief Complaint:   Transfusion medicine consultation.         History of Present Illness:   64 year old male presents along with his wife for consultation for autologous  HPC  collection.  His past medical history includes multiple myeloma, hospitalization for significant pulmonary infection last year, and hypertension.  He is currently feeling well.  The patient denies any back pain that would prevent him from tolerating the procedure. The patient served in the [employer]  and was stationed in [place] for 2-3 years, representing a potential infectious disease risk that is required to be reflected on the collection bag, but is not concerning with regard to patient safety.  The patient has no other identifiable risk factors for infectious disease.  The procedure, risks/benefits were discussed with the patient and his wife, all of their questions were answered, and consent was obtained..             Past Medical History:   No past medical history on file.          Past Surgical History:   No past surgical history on file.           Social History:     Social History     Tobacco Use    Smoking status: Never    Smokeless tobacco: Never   Substance Use Topics    Alcohol use: Not on file               Immunizations:     Immunization History   Administered Date(s) Administered    COVID-19 MONOVALENT 12+ (Pfizer) 04/14/2021, 05/10/2021             Allergies:   No Known Allergies          Medications:  "    Current Outpatient Medications   Medication Sig Dispense Refill    acyclovir (ZOVIRAX) 800 MG tablet Take 800 mg by mouth 2 times daily      allopurinol (ZYLOPRIM) 300 MG tablet Take 300 mg by mouth daily      aspirin 81 MG EC tablet Take 81 mg by mouth daily      lisinopril (ZESTRIL) 10 MG tablet Take 10 mg by mouth every evening      lisinopril (ZESTRIL) 20 MG tablet Take 20 mg by mouth every morning      magnesium 200 MG TABS Take 200 mg by mouth daily      metoprolol succinate ER (TOPROL XL) 100 MG 24 hr tablet Take 100 mg by mouth every evening      potassium chloride ER (K-TAB/KLOR-CON) 10 MEQ CR tablet Take 10 mEq by mouth 2 times daily       No current facility-administered medications for this encounter.             Review of Systems:     CONSTITUTIONAL: NEGATIVE for fever, chills, change in weight  ENT/MOUTH: NEGATIVE for ear, mouth and throat problems  RESP: NEGATIVE for significant cough or SOB  CV: NEGATIVE for chest pain, palpitations or peripheral edema           Vital Signs:   Ht 1.8 m (5' 10.87\")   Wt 104.6 kg (230 lb 9.6 oz)   BMI 32.28 kg/m              Data:       BMP  Recent Labs   Lab 05/13/24  1158      POTASSIUM 3.8   CHLORIDE 105   REY 9.4   CO2 26   BUN 15.5   CR 0.85   GLC 90     CBC  Recent Labs   Lab 05/14/24  1323 05/13/24  1158   WBC 5.0 4.4   RBC 4.08* 4.21*   HGB 13.0* 13.4   HCT 38.2* 40.1   MCV 94 95   MCH 31.9 31.8   MCHC 34.0 33.4   RDW 14.6 14.7    197     INR  Recent Labs   Lab 05/13/24  1158   INR 1.01           ABO/RH(D)   Date Value Ref Range Status   05/13/2024 O POS  Final     Antibody Screen   Date Value Ref Range Status   05/13/2024 Negative Negative Final           Fausto Izquierdo MD  Transfusion Medicine Fellow  T: [phone]  P: [phone]          Attestation:  I, Siva Briseno MD, saw and evaluated this patient during their visit with the transfusion medicine fellow, Fausto Izquierdo MD. I have reviewed the patient's records and data, this " includes all of the above testing results.  I agree with the fellow's  findings and plan of care as documented in their note.  Proceed with series of hematopoietic progenitor cell collections by apheresis as part of the treatment for his multiple myeloma.  30 minutes spent on the date of the encounter doing chart review, history and exam, documentation and review of test results.    Siva Briseno MD  Transfusion Medicine Attending  Laboratory Medicine and Pathology

## 2024-05-16 NOTE — PROGRESS NOTES
"APHERESIS INITIAL CONSULT CHECKLIST    Current Encounter Information  Current Encounter Information: Reason for Visit, Allergies and Current Meds  Procedure Requested: MNC/PBSC Collection  History of: (Reason for Apheresis): Multiple Myeloma    Access Assessment  Access Assessment  Needs a catheter placed for Apheresis?: Yes, transfusion medicine physician informed.    Vital Signs  Vital Signs  BP: (!) 182/97 **providers aware   Pulse: 72  Temp: 97.6  F (36.4  C)  Temp src: Oral  Resp: 16  Height: 180 cm (5' 10.87\")  Weight: 104.6 kg (230 lb 9.6 oz)    Reviewed   Review With Patient  Have you read the brochure Getting ready for Apheresis?: Yes  Have you had any invasive procedures, surgery, biopsy, bleeding in the last month?: Yes (Bone marrow biopsy 5/14/24)  Transfusion medicine physician informed: Yes  Review medications and allergies: Yes  Have you ever been transfused?: No  Do you require pre-medication for blood products?: No  Patient given tour of the unit: Yes    Additional Information  Notes, needs and time spent with patient  Explain procedure, side effects or reactions, instructions: Yes  Patient has special need?: No  Time spent: 45 min face to face    Patient here for consult with wife, Comfort. Procedure explained, all questions answered. Low fat diet and hydration encouraged. Patient was in the [employer] from [employer]28-[employer]10. Patient lived in [place] from 0125-4522. Per SOP, if patient lived in [place] for more than 6 months they are at theoretical risk of developing vCJD. Product will need biohazard tag. Cell therapy notified and providers aware. Patient met with Dr. Izquierdo and Dr. Briseno for consent.        "

## 2024-05-21 LAB
ABC 95% CONFIDENCE INTERVAL (B-CELL): NORMAL
ABC CLONOSEQ B-CELL TRACKING (MRD) RESULT: NORMAL
ABC DOMINANT SEQUENCES (B-CELL): 2
ABC RESIDUAL CLONAL CELLS/ MILL NUCLEATED CELLS BCELL: 848 PER MILLION NUCLEATED CELLS

## 2024-05-22 ENCOUNTER — OFFICE VISIT (OUTPATIENT)
Dept: TRANSPLANT | Facility: CLINIC | Age: 64
End: 2024-05-22
Attending: INTERNAL MEDICINE
Payer: COMMERCIAL

## 2024-05-22 ENCOUNTER — MEDICAL CORRESPONDENCE (OUTPATIENT)
Dept: TRANSPLANT | Facility: CLINIC | Age: 64
End: 2024-05-22

## 2024-05-22 ENCOUNTER — APPOINTMENT (OUTPATIENT)
Dept: LAB | Facility: CLINIC | Age: 64
End: 2024-05-22
Attending: INTERNAL MEDICINE
Payer: COMMERCIAL

## 2024-05-22 VITALS
RESPIRATION RATE: 16 BRPM | OXYGEN SATURATION: 97 % | SYSTOLIC BLOOD PRESSURE: 188 MMHG | DIASTOLIC BLOOD PRESSURE: 105 MMHG | HEART RATE: 64 BPM | BODY MASS INDEX: 32.3 KG/M2 | WEIGHT: 230.7 LBS | TEMPERATURE: 97.8 F

## 2024-05-22 DIAGNOSIS — Z01.818 EXAMINATION PRIOR TO CHEMOTHERAPY: ICD-10-CM

## 2024-05-22 DIAGNOSIS — C90.00 MULTIPLE MYELOMA, REMISSION STATUS UNSPECIFIED (H): ICD-10-CM

## 2024-05-22 DIAGNOSIS — E55.9 VITAMIN D DEFICIENCY: ICD-10-CM

## 2024-05-22 DIAGNOSIS — I10 BENIGN ESSENTIAL HYPERTENSION: Primary | ICD-10-CM

## 2024-05-22 DIAGNOSIS — Z86.2 PERSONAL HISTORY OF DISEASES OF BLOOD AND BLOOD-FORMING ORGANS: ICD-10-CM

## 2024-05-22 LAB
ALBUMIN MFR UR ELPH: 10.1 MG/DL
COLLECT DURATION TIME UR: 24 H
COLLECT DURATION TIME UR: 24 H
CREAT 24H UR-MRATE: 2.04 G/SPEC (ref 0.98–2.2)
CREAT CL 24H UR+SERPL-VRATE: 155 ML/MIN (ref 66–143)
CREAT CL/1.73 SQ M 24H UR+SERPL-ARVRAT: 117 ML/MIN/1.7M2 (ref 110–180)
CREAT SERPL-MCNC: 0.91 MG/DL (ref 0.67–1.17)
CREAT SERPL-MCNC: 0.91 MG/DL (ref 0.67–1.17)
CREAT UR-MCNC: 77.1 MG/DL
EGFRCR SERPLBLD CKD-EPI 2021: >90 ML/MIN/1.73M2
PROT 24H UR-MRATE: 266.84 MG/SPECIMEN
SPECIMEN VOL UR: 2642 ML
SPECIMEN VOL UR: 2642 ML

## 2024-05-22 PROCEDURE — 81050 URINALYSIS VOLUME MEASURE: CPT | Performed by: PATHOLOGY

## 2024-05-22 PROCEDURE — G0463 HOSPITAL OUTPT CLINIC VISIT: HCPCS | Performed by: INTERNAL MEDICINE

## 2024-05-22 PROCEDURE — 99215 OFFICE O/P EST HI 40 MIN: CPT | Performed by: INTERNAL MEDICINE

## 2024-05-22 PROCEDURE — 84156 ASSAY OF PROTEIN URINE: CPT | Performed by: PATHOLOGY

## 2024-05-22 PROCEDURE — 82575 CREATININE CLEARANCE TEST: CPT | Performed by: PATHOLOGY

## 2024-05-22 PROCEDURE — G2211 COMPLEX E/M VISIT ADD ON: HCPCS | Performed by: INTERNAL MEDICINE

## 2024-05-22 PROCEDURE — 82565 ASSAY OF CREATININE: CPT | Performed by: INTERNAL MEDICINE

## 2024-05-22 PROCEDURE — 99417 PROLNG OP E/M EACH 15 MIN: CPT | Performed by: INTERNAL MEDICINE

## 2024-05-22 PROCEDURE — 36415 COLL VENOUS BLD VENIPUNCTURE: CPT | Performed by: INTERNAL MEDICINE

## 2024-05-22 PROCEDURE — 84166 PROTEIN E-PHORESIS/URINE/CSF: CPT | Mod: 26 | Performed by: PATHOLOGY

## 2024-05-22 PROCEDURE — 99000 SPECIMEN HANDLING OFFICE-LAB: CPT | Performed by: PATHOLOGY

## 2024-05-22 RX ORDER — AMLODIPINE BESYLATE 10 MG/1
10 TABLET ORAL DAILY
Qty: 30 TABLET | Refills: 11 | Status: SHIPPED | OUTPATIENT
Start: 2024-05-22 | End: 2024-05-23

## 2024-05-22 ASSESSMENT — PAIN SCALES - GENERAL: PAINLEVEL: MILD PAIN (3)

## 2024-05-22 NOTE — NURSING NOTE
"Oncology Rooming Note    May 22, 2024 12:04 PM   Inocente Romero is a 64 year old male who presents for:    Chief Complaint   Patient presents with    Blood Draw     Vitals taken, venipuncture labs drawn, checked into next appt    Oncology Clinic Visit     Multiple Myeloma      Initial Vitals: BP (!) 188/105 (BP Location: Left arm, Patient Position: Sitting, Cuff Size: Adult Large)   Pulse 64   Temp 97.8  F (36.6  C) (Oral)   Resp 16   Wt 104.6 kg (230 lb 11.2 oz)   SpO2 97%   BMI 32.30 kg/m   Estimated body mass index is 32.3 kg/m  as calculated from the following:    Height as of 5/16/24: 1.8 m (5' 10.87\").    Weight as of this encounter: 104.6 kg (230 lb 11.2 oz). Body surface area is 2.29 meters squared.  Mild Pain (3) Comment: Data Unavailable   No LMP for male patient.  Allergies reviewed: Yes  Medications reviewed: Yes    Medications: Medication refills not needed today.  Pharmacy name entered into EPIC: Data Unavailable    Frailty Screening:   Is the patient here for a new oncology consult visit in cancer care? 2. No      Clinical concerns: none.       Julio César Flynn"

## 2024-05-22 NOTE — NURSING NOTE
Chief Complaint   Patient presents with    Blood Draw     Vitals taken, venipuncture labs drawn, checked into next appt     BP (!) 188/105 (BP Location: Left arm, Patient Position: Sitting, Cuff Size: Adult Large)   Pulse 64   Temp 97.8  F (36.6  C) (Oral)   Resp 16   Wt 104.6 kg (230 lb 11.2 oz)   SpO2 97%   BMI 32.30 kg/m    JoséL uis Villagomez RN on 5/22/2024 at 11:56 AM

## 2024-05-22 NOTE — LETTER
5/22/2024         RE: Inocente Romero  [address]  [place] [address]        Dear Colleague,    Thank you for referring your patient, Inocente Romero, to the [facility] BLOOD AND MARROW TRANSPLANT PROGRAM [facility]. Please see a copy of my visit note below.    BMT/Cell Therapy Work Up Summary      Inocente Romero is a 64 year old male referred by Dr. Sterling Jackman for MM.      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      Date Treatment Name Response Side Effects / Toxicities   10/27/23-3/15/24 Vrd x 8 cycles   Fatigue, diarrhea, abdominal cramps   10/27/23 Zometa                                  HPI:  Please see my entry above for disease and treatment history.  Feels well.      ROS:    10 point ROS neg other than the symptoms noted above in the HPI.        No past medical history on file.    No past surgical history on file.    No family history on file.    Social History     Tobacco Use    Smoking status: Never    Smokeless tobacco: Never         No Known Allergies     Current Outpatient Medications   Medication Sig Dispense Refill    Cyanocobalamin 1000 MCG/ML KIT 1,000 mcg      acyclovir (ZOVIRAX) 800 MG tablet Take 800 mg by mouth 2 times daily      allopurinol (ZYLOPRIM) 300 MG tablet Take 300 mg by mouth daily      amLODIPine (NORVASC) 10 MG tablet Take 1 tablet (10 mg) by mouth daily 30 tablet 11    aspirin 81 MG EC tablet Take 81 mg by mouth daily      lisinopril (ZESTRIL) 10 MG tablet Take 10 mg by mouth every evening      lisinopril (ZESTRIL) 20 MG tablet Take 20 mg by mouth every morning      magnesium 200 MG TABS Take 200 mg by mouth daily      metoprolol succinate ER (TOPROL XL) 100 MG 24 hr tablet Take 100 mg by mouth every  evening      potassium chloride ER (K-TAB/KLOR-CON) 10 MEQ CR tablet Take 10 mEq by mouth 2 times daily           Physical Exam:     BP (!) 188/105 (BP Location: Left arm, Patient Position: Sitting, Cuff Size: Adult Large)   Pulse 64   Temp 97.8  F (36.6  C) (Oral)   Resp 16   Wt 104.6 kg (230 lb 11.2 oz)   SpO2 97%   BMI 32.30 kg/m    GA: NAD. Appears as stated age.  HEENT: PERRL, OP Clear  Neck: Supple, no JVD  CV: RRR, no mrg  Lungs: CTAB, no wheezes  Abd: Soft, nt, nd  Lymph: No cervical LAD, no HSM  Ext: No edema. Warm  Neuro: AAO, moving all ext. Symmetric face  Skin: warm, no rashes  Psyc: Appropriate and cooperative  Access: none    ECO      BMT and Cell Therapy Informed Consent Discussion     MM, Standard risk      In today's visit, we discussed in detail the research for which Inocente Romero is eligible. We discussed the potential risks and potential benefits of each protocol individually. We explained potential alternatives to the protocols discussed. We explained to the patient that participation is voluntary and that consent may be withdrawn at any time.     We discussed:  The rationale for our approach to the disease treatment  The eligibility requirements for treatment in the context of clinical trials  The need for caregiver support and the caregiver's role in recovery  The importance of adherence to the treatment plan and appropriate follow up  The requirements for contraception while undergoing treatment  The potential risks of morbidity and mortality related to this treatment  The requirements for supportive care to reduce the risk of infection and other complications  The role of the dietician and PT/OT to reduce the risk of muscle loss/sarcopenia  Support that is available through our social workers and care team to mitigate distress  The desired outcomes/goals of treatment, including the possibility of long-term disease control    The patient completed the last round of treatment  on 4 weeks ago.    HCT-CI score: 0. We counseled the patient about the impact of this on the risk of treatment related and overall mortality. The score fit within treatment protocol eligibility criteria.    Karnofsky performance score: 80       ECOG: (required for CAR-T): 1    Active infections:  none.  Prior infections that require additional special prophylaxis considerations: .  I reviewed and discussed infectious disease evaluation with the patient and the management plan during treatment.    Reproductive status: What methods of birth control does the patient plan to use during the treatment period beginning with conditioning and ending with the discontinuation of immune suppression (indicate with an X all that apply):  __ The patient is confirmed to be sterile or post-menopausal  _x_ Sexual abstinence  _x_ Condoms  __ Implants  __ Injectables  __ Oral contraceptives  __ Intrauterine devices (IUD)  __ Other (describe)    The patient received appropriate reproductive counseling and agreed with the need for effective contraception during the treatment procedures.    Dental health suitable to proceed: Yes    Transplants for multiple myeloma (delete if not needed):  The patient has Standard risk MM, and the collection goal is 8 million CD34/kg for 2 transplants..  The day for admission to begin melphalan conditioning is Day -1 for melphalan 200 mg/m2 (not frail, creatinine clearance 30+). .    After our detailed discussion above, the patient signed the following consents for treatment and protocols:  ASCT for MM     Known issues that I take into account for medical decisions, with salient changes to the plan considering these complexities noted above.    There is no problem list on file for this patient.        I spent 60 minutes in the care of this patient today, which included time necessary for preparation for the visit, obtaining history, ordering medications/tests/procedures as medically indicated, review of  pertinent medical literature, counseling of the patient, communication of recommendations to the care team, and documentation time.    Gianfranco Carlin MD      ____________________________________________________________________          BMT/Cell Therapy Workup Summary    Primary BMT Physician: Raegan  Date of Summary:  05/23/2024        Patient Demographics       Patient ID:  Inocente Romero   Age:  64 year old   Sex:  male  Reason for Transplant: MM  Protocol: Auto      Donor Characteristics       Donor-Specific Antibodies:  No lab results found.      Virtual Crossmatch:    No lab results found.      Blood Counts       Recent Labs   Lab Test 05/14/24  1323 05/13/24  1158   HGB 13.0* 13.4   HCT 38.2* 40.1   WBC 5.0 4.4   ANEUTAUTO 3.3 2.9   ALYMPAUTO 1.1 1.0   AMONOAUTO 0.6 0.4   AEOSAUTO 0.0 0.0   ABSBASO 0.0 0.0   NRBCMAN 0.0 0.0    197         Recent Labs   Lab Test 05/13/24  1158   ABORH O POS         No lab results found.      Chemistries     Basic Panel  Recent Labs   Lab Test 05/22/24  1152 05/22/24  0711 05/13/24  1158   NA  --   --  141   POTASSIUM  --   --  3.8   CHLORIDE  --   --  105   CO2  --   --  26   BUN  --   --  15.5   CR 0.91 0.91 0.85   GLC  --   --  90        Calcium, Magnesium, Phosphorus  Recent Labs   Lab Test 05/13/24  1158   REY 9.4   MAG 2.0   PHOS 2.8        LFTs  Recent Labs   Lab Test 05/13/24  1158   BILITOTAL 0.6   ALKPHOS 48   AST 20   ALT 16   ALBUMIN 4.6       LDH  Recent Labs   Lab Test 05/13/24  1158          B2-Microglobulin  Recent Labs   Lab Test 05/13/24  1158   [id number] 1.7       Vitamin D  Recent Labs   Lab Test 05/13/24  1158   VITDT 19*         Urine Studies       Recent Labs   Lab Test 05/13/24  1158   COLOR Light Yellow   APPEARANCE Clear   URINEGLC Negative   URINEBILI Negative   URINEKETONE Negative   SG 1.023   UBLD Negative   URINEPH 5.5   PROTEIN 10*   UUROI Normal   NITRITE Negative   LEUKEST Negative   MUCUS Present*   RBCU 1   WBCU 2    USQEI <1       Creatinine Clearance    Recent Labs   Lab Test 05/22/24  0711      .0   *      VOL 2,642  2,642   DUR 24.0  24.0   UCRR 77.1   UCR24 2.04         Infectious Disease Markers     [facility] IDM    Recent Labs   Lab Test 05/13/24  1158   DCMIG Negative   DHBSAG Non-reactive   DHBCAB Non-reactive   DHIVAB Non-reactive   DHCVAB Non-reactive   DHTLVA Non-reactive   TCRUZI Non-reactive   DTRPAB Non-reactive       HIV Ab  No lab results found.    HepB core Ab  No lab results found.    HepB surface Ab  No lab results found.  No lab results found.    HepB antigen  No lab results found.    Hep C Ab  No lab results found.    Trypanosoma  Recent Labs   Lab Test 05/13/24  1158   TCRUZI Non-reactive       CMV  No lab results found.    EBV  Recent Labs   Lab Test 05/13/24  1158   EBVCAG Positive*       HSV 1/2  Recent Labs   Lab Test 05/13/24  1158   O9XNDCC 37.50*   H1IGG Positive.  IgG antibody to HSV-1 detected.*   J2HLAYE 0.04   H2IGG No HSV-2 IgG antibodies detected.       VZV  No lab results found.    HTLV  Recent Labs   Lab Test 05/13/24  1158   DHTLVA Non-reactive     No lab results found.    Toxoplasma  Recent Labs   Lab Test 05/13/24  1158   TOXGONGIAB <3.0     Recent Labs   Lab Test 05/13/24  1158   TOXAM <3.0       COVID  No lab results found.      Immunoglobulins     Recent Labs   Lab Test 05/13/24  1158   IGG 1,055       Recent Labs   Lab Test 05/13/24  1158          Recent Labs   Lab Test 05/13/24  1158   IGM 21*         Monocloncal Protein Studies     M spike    Recent Labs   Lab Test 05/13/24  1158   ELPM 0.7*       [place] FLC    Recent Labs   Lab Test 05/13/24  1158   KFLCA 1.40       Lambda FLC    Recent Labs   Lab Test 05/13/24  1158   LFLCA 0.50*       FLC Ratio    Recent Labs   Lab Test 05/13/24  1158   KLRA 2.80*           Bone Marrow Biopsy       Lab Results   Component Value Date    FINALDX  05/14/2024     Bone marrow, posterior iliac crest, left  decalcified trephine biopsy and touch imprint; left direct aspirate smear, concentrate aspirate smear, and peripheral blood smear:    Residual plasma cell myeloma with the following characteristics:  - Normocellular marrow (30% estimated cellularity) with trilineage hematopoiesis, no dysplasia, no increase in blasts, and kappa-restricted plasma cells (4-5% by immunohistochemistry)  - Peripheral blood showing slight normochromic normocytic anemia  - See comment        COMDX  05/14/2024     The immunophenotypic findings are consistent with recurrent/persistent plasma cell neoplasm. Final interpretation requires correlation with results of other ancillary studies, morphologic, and clinical features.           Lab Results   Component Value Date    FLINTERP  05/14/2024     A. Iliac Crest, Bone Marrow Aspirate, Left:  - Kappa-monotypic plasma cells  - Polytypic B cells  - See comment      COMDX  05/14/2024     The immunophenotypic findings are consistent with recurrent/persistent plasma cell neoplasm. Final interpretation requires correlation with results of other ancillary studies, morphologic, and clinical features.           Chest X-Ray - 2 view     Results for orders placed during the hospital encounter of 05/16/24    XR CHEST 2 VIEWS    Status: Normal 5/16/2024    Narrative  EXAM: XR CHEST 2 VIEWS  5/16/2024 8:09 AM    HISTORY:  Vitamin D deficiency; Multiple myeloma, remission status  unspecified (H); Examination prior to chemotherapy; Personal history  of diseases of blood and blood-forming organs    COMPARISON:  2/12/2024    FINDINGS: Frontal and lateral upright views of the chest. Cardiac  mediastinal silhouette is unchanged. Mildly prominent pulmonary  vasculature. Streaky bibasilar pulmonary opacities more pronounced on  the right. No significant pleural effusion or visualized pneumothorax.  No consolidation or suspicious pulmonary nodules. The visualized upper  abdomen is normal. No acute fracture or  suspicious bony abnormality.    Impression  IMPRESSION: Mildly prominent pulmonary vasculature and streaky right  basilar opacities suggestive of edema/atelectasis.    I have personally reviewed the examination and initial interpretation  and I agree with the findings.    GELY BRISENO MD      SYSTEM ID:  [id number]        Chest CT without Contrast       No results found for this or any previous visit.        PFTs     FVC%  Recent Labs   Lab Test 24  114 111       FEV1%  Recent Labs   Lab Test 24 109       DLCO%  Recent Labs   Lab Test 24  114 113         EKG       ECG results from 24   EKG 12-lead complete w/read - Clinics     Value    Systolic Blood Pressure     Diastolic Blood Pressure     Ventricular Rate 58    Atrial Rate 58    WV Interval 192    QRS Duration 114        QTc 475    P Axis 43    R AXIS -35    T Axis 67    Interpretation ECG      Sinus bradycardia  Left axis deviation  Nonspecific T wave abnormality  Prolonged QT  Abnormal ECG  No previous ECGs available  Confirmed by MD MENDOZA DAVID () on 2024 12:13:06 PM           ECHOCARDIOGRAM       Results for orders placed during the hospital encounter of 24    ECHOCARDIOGRAM COMPLETE    Status: Normal 2024    Narrative  [id number]  [id number]  [id number]  [id number]^JAQCUELINE    [facility],[place]  Echocardiography Laboratory  [address] [place] [address]    Name: GABE VIVEROS  MRN: [id number]  : 1960  Study Date: 2024 08:30 AM  Age: 64 yrs  Gender: Male  Patient Location: [facility]  Reason For Study: Vitamin D deficiency, Multiple myeloma, remission status  unspeci  Ordering Physician: JACQUELINE MEDINA  Referring Physician: JACQUELINE MEDINA  Performed By: Ileana Dasilva RDCS    BSA: 2.2 m2  Height: 70 in  Weight: 230 lb  BP: 197/102  mmHg  ______________________________________________________________________________  Procedure  Echocardiogram with two-dimensional, color and spectral Doppler performed.  ______________________________________________________________________________  Interpretation Summary  Global and regional left ventricular function is normal with an EF of 55-60%.  Mild concentric wall thickening consistent with left ventricular hypertrophy  is present.  Global right ventricular function is normal.  Mild aortic and mitral insufficiency present.  Estimated mean right atrial pressure is normal.  No pericardial effusion is present.  ______________________________________________________________________________  Left Ventricle  Left ventricular size is normal. Global and regional left ventricular function  is normal with an EF of 55-60%. Mild concentric wall thickening consistent  with left ventricular hypertrophy is present. Left ventricular diastolic  function is abnormal. Global peak LV longitudinal strain is averaged at -16%.  This suggests abnormal strain (normal <-18%).    Right Ventricle  The right ventricle is normal size. Global right ventricular function is  normal.    Atria  The right atria appears normal. Mild left atrial enlargement is present.    Mitral Valve  Mild mitral insufficiency is present.    Aortic Valve  The aortic valve is tricuspid. Aortic valve sclerosis is present. Mild aortic  insufficiency is present.    Tricuspid Valve  The tricuspid valve is normal. The peak velocity of the tricuspid regurgitant  jet is not obtainable. Pulmonary artery systolic pressure cannot be assessed.    Pulmonic Valve  Trace to mild pulmonic insufficiency is present.    Vessels  The aorta root is normal. The inferior vena cava was normal in size with  preserved respiratory variability. Estimated mean right atrial pressure is  normal.    Pericardium  No pericardial effusion is present.    Compared to Previous Study  There is no  prior study for direct comparison.  ______________________________________________________________________________  MMode/2D Measurements & Calculations    IVSd: 1.3 cm  LVIDd: 5.2 cm  LVIDs: 3.0 cm  LVPWd: 1.4 cm  FS: 41.7 %  LV mass(C)d: 292.6 grams  LV mass(C)dI: 132.1 grams/m2  Ao root diam: 4.0 cm  asc Aorta Diam: 4.0 cm  LVOT diam: 2.5 cm  LVOT area: 5.1 cm2    EF(MOD-bp): 50.3 %  Ao root diam index Ht(cm/m): 2.2  Ao root diam index BSA (cm/m2): 1.8  Asc Ao diam index BSA (cm/m2): 1.8  Asc Ao diam index Ht(cm/m): 2.3  LA Volume (BP): 67.5 ml    LA Volume Index (BP): 30.4 ml/m2  RV Base: 3.7 cm  RWT: 0.53  TAPSE: 2.1 cm    Doppler Measurements & Calculations  MV E max kenyon: 54.0 cm/sec  MV A max kenyon: 69.7 cm/sec  MV E/A: 0.77  MV dec slope: 166.6 cm/sec2  MV dec time: 0.32 sec  PA acc time: 0.11 sec  E/E' av.6  Lateral E/e': 9.5  Medial E/e': 9.7  RV S Kenyon: 15.0 cm/sec    Measurements from QLAB  LV GLS Endo Peak A2C (AS): -16.5 %  LV GLS Endo Peak A3C (AS): -15.4 %  LV GLS Endo Peak A4C (AS): -16.3 %  LV GLS Endo Peak Avg (AS): -16.1 %  ______________________________________________________________________________  Report approved by: Rhonda Dickerson 2024 09:57 AM        PET Scan       Results for orders placed during the hospital encounter of 24    PET ONCOLOGY WHOLE BODY    Status: Normal 2024    Narrative  Combined Report of: PET and CT on  2024 11:22 AM:    1. PET of the neck, chest, abdomen, and pelvis.  2. PET CT Fusion for Attenuation Correction and Anatomical  Localization:  3. 3D MIP and PET-CT fused images were processed on an independent  workstation and archived to PACS and reviewed by a radiologist.    Technique:    1. PET: The patient received 13.79 mCi of F-18-FDG; the serum glucose  was 100 mg/dL prior to administration, body weight was 104.5 kg.  Images were evaluated in the axial, sagittal, and coronal planes as  well as the rotational whole body MIP. Images were  acquired from the  Vertex to the Feet.    UPTAKE WAS MEASURED AT 63 MINUTES.    2. CT: CT only obtained for attenuation correction and not diagnostic  purposes.    INDICATION: Vitamin D deficiency; Multiple myeloma, remission status  unspecified (H); Examination prior to chemotherapy; Personal history  of diseases of blood and blood-forming organs    COMPARISON: PET/CT dated 10/10/2023.    FINDINGS:  BACKGROUND: Liver SUV max = 3.5, Aorta Blood SUV max = 2.8.    HEAD/NECK:  No abnormal uptake.    Mild diffuse cerebral volume loss is likely age related.    CHEST:  No abnormal uptake.    Mild scattered coronary calcifications.    ABDOMEN AND PELVIS:  No abnormal uptake.    Mild multifocal patchy FDG uptake in the prostate gland.    Photopenic right renal cyst. Small splenule. Mild aneurysmal  dilatation of the celiac trunk. Small fat-containing umbilical and  right inguinal hernias.    BONES AND SOFT TISSUES:  No hypermetabolic myelomatous deposits identified. Decreased focal  uptake in the right humerus with SUV max of 2.3 (4/61), previously  4.0.    Similar non-FDG avid diffuse osteolytic changes throughout the axial  and proximal appendicular skeleton. Linearly increased uptake along  the left posterior iliac bone likely represents the prior biopsy  tract.    Multilevel degenerative changes in the spine.    Impression  IMPRESSION:    1. No hypermetabolic myelomatous deposits identified. Interval reduced  focal FDG uptake in the right proximal humerus, with uptake intensity  similar to blood pool favoring residual inflammation.    2. Similar non-FDG avid diffuse osteolytic changes throughout the  axial and proximal appendicular skeleton.    3. Mild patchy heterogenous FDG uptake in the prostate gland is  nonspecific, correlate with serum prostate-specific antigen levels.    AMAN ORELLANA MD      SYSTEM ID:  [id number]         MRI Brain       No results found for this or any previous visit.         CSF Studies        No lab results found.    No lab results found.    No lab results found.    The longitudinal plan of care for the diagnosis(es)/condition(s) as documented were addressed during this visit. Due to the added complexity in care, I will continue to support Felix in the subsequent management and with ongoing continuity of care.      Gianfranco Carlin MD

## 2024-05-23 DIAGNOSIS — I10 BENIGN ESSENTIAL HYPERTENSION: ICD-10-CM

## 2024-05-23 LAB
ALBUMIN MFR UR ELPH: 74.3 %
ALPHA1 GLOB MFR UR ELPH: 10.5 %
ALPHA2 GLOB MFR UR ELPH: 6.1 %
B-GLOBULIN MFR UR ELPH: 6.8 %
GAMMA GLOB MFR UR ELPH: 2.3 %
M PROTEIN MFR UR ELPH: 0 %
PATH REPORT.COMMENTS IMP SPEC: ABNORMAL
PROT PATTERN UR ELPH-IMP: ABNORMAL

## 2024-05-23 RX ORDER — AMLODIPINE BESYLATE 10 MG/1
10 TABLET ORAL DAILY
Qty: 30 TABLET | Refills: 11 | Status: SHIPPED | OUTPATIENT
Start: 2024-05-23 | End: 2024-07-19

## 2024-05-23 NOTE — PROGRESS NOTES
BMT/Cell Therapy Work Up Summary      Inocente Romero is a 64 year old male referred by Dr. Sterling Jackman for MM.      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      Date Treatment Name Response Side Effects / Toxicities   10/27/23-3/15/24 Vrd x 8 cycles   Fatigue, diarrhea, abdominal cramps   10/27/23 Zometa                                  HPI:  Please see my entry above for disease and treatment history.  Feels well. Him and his wife have some activities in June and strongly preferred to delay things till then.    ROS:    10 point ROS neg other than the symptoms noted above in the HPI.        No past medical history on file.    No past surgical history on file.    No family history on file.    Social History     Tobacco Use    Smoking status: Never    Smokeless tobacco: Never         No Known Allergies     Current Outpatient Medications   Medication Sig Dispense Refill    Cyanocobalamin 1000 MCG/ML KIT 1,000 mcg      acyclovir (ZOVIRAX) 800 MG tablet Take 800 mg by mouth 2 times daily      allopurinol (ZYLOPRIM) 300 MG tablet Take 300 mg by mouth daily      amLODIPine (NORVASC) 10 MG tablet Take 1 tablet (10 mg) by mouth daily 30 tablet 11    aspirin 81 MG EC tablet Take 81 mg by mouth daily      lisinopril (ZESTRIL) 10 MG tablet Take 10 mg by mouth every evening      lisinopril (ZESTRIL) 20 MG tablet Take 20 mg by mouth every morning      magnesium 200 MG TABS Take 200 mg by mouth daily      metoprolol succinate ER (TOPROL XL) 100 MG 24 hr tablet Take 100 mg by mouth every evening      potassium chloride ER (K-TAB/KLOR-CON) 10 MEQ CR tablet Take 10 mEq by mouth 2 times daily           Physical Exam:     BP (!) 188/105 (BP Location: Left arm, Patient Position:  Sitting, Cuff Size: Adult Large)   Pulse 64   Temp 97.8  F (36.6  C) (Oral)   Resp 16   Wt 104.6 kg (230 lb 11.2 oz)   SpO2 97%   BMI 32.30 kg/m    GA: NAD. Appears as stated age.  HEENT: PERRL, OP Clear  Neck: Supple, no JVD  CV: RRR, no mrg  Lungs: CTAB, no wheezes  Abd: Soft, nt, nd  Lymph: No cervical LAD, no HSM  Ext: No edema. Warm  Neuro: AAO, moving all ext. Symmetric face  Skin: warm, no rashes  Psyc: Appropriate and cooperative  Access: none    ECO      BMT and Cell Therapy Informed Consent Discussion     MM, Standard risk      In today's visit, we discussed in detail the research for which Inocente Romero is eligible. We discussed the potential risks and potential benefits of each protocol individually. We explained potential alternatives to the protocols discussed. We explained to the patient that participation is voluntary and that consent may be withdrawn at any time.     We discussed:  The rationale for our approach to the disease treatment  The eligibility requirements for treatment in the context of clinical trials  The need for caregiver support and the caregiver's role in recovery  The importance of adherence to the treatment plan and appropriate follow up  The requirements for contraception while undergoing treatment  The potential risks of morbidity and mortality related to this treatment  The requirements for supportive care to reduce the risk of infection and other complications  The role of the dietician and PT/OT to reduce the risk of muscle loss/sarcopenia  Support that is available through our social workers and care team to mitigate distress  The desired outcomes/goals of treatment, including the possibility of long-term disease control    The patient completed the last round of treatment on 4 weeks ago.    HCT-CI score: 0. We counseled the patient about the impact of this on the risk of treatment related and overall mortality. The score fit within treatment protocol  eligibility criteria.    Karnofsky performance score: 80       ECOG: (required for CAR-T): 1    Active infections:  none.  Prior infections that require additional special prophylaxis considerations: .  I reviewed and discussed infectious disease evaluation with the patient and the management plan during treatment.    Reproductive status: What methods of birth control does the patient plan to use during the treatment period beginning with conditioning and ending with the discontinuation of immune suppression (indicate with an X all that apply):  __ The patient is confirmed to be sterile or post-menopausal  _x_ Sexual abstinence  _x_ Condoms  __ Implants  __ Injectables  __ Oral contraceptives  __ Intrauterine devices (IUD)  __ Other (describe)    The patient received appropriate reproductive counseling and agreed with the need for effective contraception during the treatment procedures.    Dental health suitable to proceed: Yes    Transplants for multiple myeloma (delete if not needed):  The patient has Standard risk MM, and the collection goal is 8 million CD34/kg for 2 transplants..  The day for admission to begin melphalan conditioning is Day -1 for melphalan 200 mg/m2 (not frail, creatinine clearance 30+). .    After our detailed discussion above, the patient signed the following consents for treatment and protocols:  ASCT for MM     Known issues that I take into account for medical decisions, with salient changes to the plan considering these complexities noted above.    There is no problem list on file for this patient.        I spent 60 minutes in the care of this patient today, which included time necessary for preparation for the visit, obtaining history, ordering medications/tests/procedures as medically indicated, review of pertinent medical literature, counseling of the patient, communication of recommendations to the care team, and documentation time.    Gianfranco Carlin,  MD      ____________________________________________________________________          BMT/Cell Therapy Workup Summary    Primary BMT Physician: Raegan  Date of Summary:  05/23/2024        Patient Demographics       Patient ID:  Inocente Romero   Age:  64 year old   Sex:  male  Reason for Transplant: MM  Protocol: Auto      Donor Characteristics       Donor-Specific Antibodies:  No lab results found.      Virtual Crossmatch:    No lab results found.      Blood Counts       Recent Labs   Lab Test 05/14/24  1323 05/13/24  1158   HGB 13.0* 13.4   HCT 38.2* 40.1   WBC 5.0 4.4   ANEUTAUTO 3.3 2.9   ALYMPAUTO 1.1 1.0   AMONOAUTO 0.6 0.4   AEOSAUTO 0.0 0.0   ABSBASO 0.0 0.0   NRBCMAN 0.0 0.0    197         Recent Labs   Lab Test 05/13/24  1158   ABORH O POS         No lab results found.      Chemistries     Basic Panel  Recent Labs   Lab Test 05/22/24  1152 05/22/24  0711 05/13/24  1158   NA  --   --  141   POTASSIUM  --   --  3.8   CHLORIDE  --   --  105   CO2  --   --  26   BUN  --   --  15.5   CR 0.91 0.91 0.85   GLC  --   --  90        Calcium, Magnesium, Phosphorus  Recent Labs   Lab Test 05/13/24  1158   REY 9.4   MAG 2.0   PHOS 2.8        LFTs  Recent Labs   Lab Test 05/13/24  1158   BILITOTAL 0.6   ALKPHOS 48   AST 20   ALT 16   ALBUMIN 4.6       LDH  Recent Labs   Lab Test 05/13/24  1158          B2-Microglobulin  Recent Labs   Lab Test 05/13/24  1158   QUCB8HTUT 1.7       Vitamin D  Recent Labs   Lab Test 05/13/24  1158   VITDT 19*         Urine Studies       Recent Labs   Lab Test 05/13/24  1158   COLOR Light Yellow   APPEARANCE Clear   URINEGLC Negative   URINEBILI Negative   URINEKETONE Negative   SG 1.023   UBLD Negative   URINEPH 5.5   PROTEIN 10*   UUROI Normal   NITRITE Negative   LEUKEST Negative   MUCUS Present*   RBCU 1   WBCU 2   USQEI <1       Creatinine Clearance    Recent Labs   Lab Test 05/22/24  0711      .0   *      VOL 2,642  2,642   DUR 24.0  24.0    UCRR 77.1   UCR24 2.04         Infectious Disease Markers     [facility] Blood [place] IDM    Recent Labs   Lab Test 05/13/24  1158   DCMIG Negative   DHBSAG Non-reactive   DHBCAB Non-reactive   DHIVAB Non-reactive   DHCVAB Non-reactive   DHTLVA Non-reactive   TCRUZI Non-reactive   DTRPAB Non-reactive       HIV Ab  No lab results found.    HepB core Ab  No lab results found.    HepB surface Ab  No lab results found.  No lab results found.    HepB antigen  No lab results found.    Hep C Ab  No lab results found.    Trypanosoma  Recent Labs   Lab Test 05/13/24  1158   TCRUZI Non-reactive       CMV  No lab results found.    EBV  Recent Labs   Lab Test 05/13/24  1158   EBVCAG Positive*       HSV 1/2  Recent Labs   Lab Test 05/13/24  1158   B3UAPLW 37.50*   H1IGG Positive.  IgG antibody to HSV-1 detected.*   [id number] 0.04   H2IGG No HSV-2 IgG antibodies detected.       VZV  No lab results found.    HTLV  Recent Labs   Lab Test 05/13/24  1158   DHTLVA Non-reactive     No lab results found.    Toxoplasma  Recent Labs   Lab Test 05/13/24  1158   TOXGONGIAB <3.0     Recent Labs   Lab Test 05/13/24  1158   TOXAM <3.0       COVID  No lab results found.      Immunoglobulins     Recent Labs   Lab Test 05/13/24  1158   IGG 1,055       Recent Labs   Lab Test 05/13/24  1158          Recent Labs   Lab Test 05/13/24  1158   IGM 21*         Monocloncal Protein Studies     M spike    Recent Labs   Lab Test 05/13/24  1158   ELPM 0.7*       [place] FLC    Recent Labs   Lab Test 05/13/24  1158   KFLCA 1.40       [place] FLC    Recent Labs   Lab Test 05/13/24  1158   LFLCA 0.50*       FLC Ratio    Recent Labs   Lab Test 05/13/24  1158   KLRA 2.80*           Bone Marrow Biopsy       Lab Results   Component Value Date    FINALDX  05/14/2024     Bone marrow, posterior iliac crest, left decalcified trephine biopsy and touch imprint; left direct aspirate smear, concentrate aspirate smear, and peripheral blood smear:    Residual plasma cell  myeloma with the following characteristics:  - Normocellular marrow (30% estimated cellularity) with trilineage hematopoiesis, no dysplasia, no increase in blasts, and kappa-restricted plasma cells (4-5% by immunohistochemistry)  - Peripheral blood showing slight normochromic normocytic anemia  - See comment        COMDX  05/14/2024     The immunophenotypic findings are consistent with recurrent/persistent plasma cell neoplasm. Final interpretation requires correlation with results of other ancillary studies, morphologic, and clinical features.           Lab Results   Component Value Date    FLINTERP  05/14/2024     A. Iliac Crest, Bone Marrow Aspirate, Left:  - Kappa-monotypic plasma cells  - Polytypic B cells  - See comment      COMDX  05/14/2024     The immunophenotypic findings are consistent with recurrent/persistent plasma cell neoplasm. Final interpretation requires correlation with results of other ancillary studies, morphologic, and clinical features.           Chest X-Ray - 2 view     Results for orders placed during the hospital encounter of 05/16/24    XR CHEST 2 VIEWS    Status: Normal 5/16/2024    Narrative  EXAM: XR CHEST 2 VIEWS  5/16/2024 8:09 AM    HISTORY:  Vitamin D deficiency; Multiple myeloma, remission status  unspecified (H); Examination prior to chemotherapy; Personal history  of diseases of blood and blood-forming organs    COMPARISON:  2/12/2024    FINDINGS: Frontal and lateral upright views of the chest. Cardiac  mediastinal silhouette is unchanged. Mildly prominent pulmonary  vasculature. Streaky bibasilar pulmonary opacities more pronounced on  the right. No significant pleural effusion or visualized pneumothorax.  No consolidation or suspicious pulmonary nodules. The visualized upper  abdomen is normal. No acute fracture or suspicious bony abnormality.    Impression  IMPRESSION: Mildly prominent pulmonary vasculature and streaky right  basilar opacities suggestive of  edema/atelectasis.    I have personally reviewed the examination and initial interpretation  and I agree with the findings.    GELY BRISENO MD      SYSTEM ID:  [id number]        Chest CT without Contrast       No results found for this or any previous visit.        PFTs     FVC%  Recent Labs   Lab Test 24 111       FEV1%  Recent Labs   Lab Test 24 109       DLCO%  Recent Labs   Lab Test 24 113         EKG       ECG results from 24   EKG 12-lead complete w/read - Clinics     Value    Systolic Blood Pressure     Diastolic Blood Pressure     Ventricular Rate 58    Atrial Rate 58    WA Interval 192    QRS Duration 114        QTc 475    P Axis 43    R AXIS -35    T Axis 67    Interpretation ECG      Sinus bradycardia  Left axis deviation  Nonspecific T wave abnormality  Prolonged QT  Abnormal ECG  No previous ECGs available  Confirmed by MD KELLY, VINITA () on 2024 12:13:06 PM           ECHOCARDIOGRAM       Results for orders placed during the hospital encounter of 24    ECHOCARDIOGRAM COMPLETE    Status: Normal 2024    Narrative  [id number]  [id number]  [id number]  [id number]^ADAM^JACQUELINE    [facility],[place]  Echocardiography Laboratory  [address] [place] [address]    Name: GABE VIVEROS  MRN: [id number]  : 1960  Study Date: 2024 08:30 AM  Age: 64 yrs  Gender: Male  Patient Location: [facility]  Reason For Study: Vitamin D deficiency, Multiple myeloma, remission status  unspeci  Ordering Physician: JACQUELINE MEDINA  Referring Physician: JACQUELINE MEDINA  Performed By: Ileana Dasilva RDCS    BSA: 2.2 m2  Height: 70 in  Weight: 230 lb  BP: 197/102 mmHg  ______________________________________________________________________________  Procedure  Echocardiogram with two-dimensional, color and spectral Doppler  performed.  ______________________________________________________________________________  Interpretation Summary  Global and regional left ventricular function is normal with an EF of 55-60%.  Mild concentric wall thickening consistent with left ventricular hypertrophy  is present.  Global right ventricular function is normal.  Mild aortic and mitral insufficiency present.  Estimated mean right atrial pressure is normal.  No pericardial effusion is present.  ______________________________________________________________________________  Left Ventricle  Left ventricular size is normal. Global and regional left ventricular function  is normal with an EF of 55-60%. Mild concentric wall thickening consistent  with left ventricular hypertrophy is present. Left ventricular diastolic  function is abnormal. Global peak LV longitudinal strain is averaged at -16%.  This suggests abnormal strain (normal <-18%).    Right Ventricle  The right ventricle is normal size. Global right ventricular function is  normal.    Atria  The right atria appears normal. Mild left atrial enlargement is present.    Mitral Valve  Mild mitral insufficiency is present.    Aortic Valve  The aortic valve is tricuspid. Aortic valve sclerosis is present. Mild aortic  insufficiency is present.    Tricuspid Valve  The tricuspid valve is normal. The peak velocity of the tricuspid regurgitant  jet is not obtainable. Pulmonary artery systolic pressure cannot be assessed.    Pulmonic Valve  Trace to mild pulmonic insufficiency is present.    Vessels  The aorta root is normal. The inferior vena cava was normal in size with  preserved respiratory variability. Estimated mean right atrial pressure is  normal.    Pericardium  No pericardial effusion is present.    Compared to Previous Study  There is no prior study for direct comparison.  ______________________________________________________________________________  MMode/2D Measurements & Calculations    IVSd:  1.3 cm  LVIDd: 5.2 cm  LVIDs: 3.0 cm  LVPWd: 1.4 cm  FS: 41.7 %  LV mass(C)d: 292.6 grams  LV mass(C)dI: 132.1 grams/m2  Ao root diam: 4.0 cm  asc Aorta Diam: 4.0 cm  LVOT diam: 2.5 cm  LVOT area: 5.1 cm2    EF(MOD-bp): 50.3 %  Ao root diam index Ht(cm/m): 2.2  Ao root diam index BSA (cm/m2): 1.8  Asc Ao diam index BSA (cm/m2): 1.8  Asc Ao diam index Ht(cm/m): 2.3  LA Volume (BP): 67.5 ml    LA Volume Index (BP): 30.4 ml/m2  RV Base: 3.7 cm  RWT: 0.53  TAPSE: 2.1 cm    Doppler Measurements & Calculations  MV E max kenyon: 54.0 cm/sec  MV A max kenyon: 69.7 cm/sec  MV E/A: 0.77  MV dec slope: 166.6 cm/sec2  MV dec time: 0.32 sec  PA acc time: 0.11 sec  E/E' av.6  Lateral E/e': 9.5  Medial E/e': 9.7  RV S Kenyon: 15.0 cm/sec    Measurements from QLAB  LV GLS Endo Peak A2C (AS): -16.5 %  LV GLS Endo Peak A3C (AS): -15.4 %  LV GLS Endo Peak A4C (AS): -16.3 %  LV GLS Endo Peak Avg (AS): -16.1 %  ______________________________________________________________________________  Report approved by: Rhonda Dickerson 2024 09:57 AM        PET Scan       Results for orders placed during the hospital encounter of 24    PET ONCOLOGY WHOLE BODY    Status: Normal 2024    Narrative  Combined Report of: PET and CT on  2024 11:22 AM:    1. PET of the neck, chest, abdomen, and pelvis.  2. PET CT Fusion for Attenuation Correction and Anatomical  Localization:  3. 3D MIP and PET-CT fused images were processed on an independent  workstation and archived to PACS and reviewed by a radiologist.    Technique:    1. PET: The patient received 13.79 mCi of F-18-FDG; the serum glucose  was 100 mg/dL prior to administration, body weight was 104.5 kg.  Images were evaluated in the axial, sagittal, and coronal planes as  well as the rotational whole body MIP. Images were acquired from the  Vertex to the Feet.    UPTAKE WAS MEASURED AT 63 MINUTES.    2. CT: CT only obtained for attenuation correction and not  diagnostic  purposes.    INDICATION: Vitamin D deficiency; Multiple myeloma, remission status  unspecified (H); Examination prior to chemotherapy; Personal history  of diseases of blood and blood-forming organs    COMPARISON: PET/CT dated 10/10/2023.    FINDINGS:  BACKGROUND: Liver SUV max = 3.5, Aorta Blood SUV max = 2.8.    HEAD/NECK:  No abnormal uptake.    Mild diffuse cerebral volume loss is likely age related.    CHEST:  No abnormal uptake.    Mild scattered coronary calcifications.    ABDOMEN AND PELVIS:  No abnormal uptake.    Mild multifocal patchy FDG uptake in the prostate gland.    Photopenic right renal cyst. Small splenule. Mild aneurysmal  dilatation of the celiac trunk. Small fat-containing umbilical and  right inguinal hernias.    BONES AND SOFT TISSUES:  No hypermetabolic myelomatous deposits identified. Decreased focal  uptake in the right humerus with SUV max of 2.3 (4/61), previously  4.0.    Similar non-FDG avid diffuse osteolytic changes throughout the axial  and proximal appendicular skeleton. Linearly increased uptake along  the left posterior iliac bone likely represents the prior biopsy  tract.    Multilevel degenerative changes in the spine.    Impression  IMPRESSION:    1. No hypermetabolic myelomatous deposits identified. Interval reduced  focal FDG uptake in the right proximal humerus, with uptake intensity  similar to blood pool favoring residual inflammation.    2. Similar non-FDG avid diffuse osteolytic changes throughout the  axial and proximal appendicular skeleton.    3. Mild patchy heterogenous FDG uptake in the prostate gland is  nonspecific, correlate with serum prostate-specific antigen levels.    AMAN ORELALNA MD      SYSTEM ID:  [id number]         MRI Brain       No results found for this or any previous visit.         CSF Studies       No lab results found.    No lab results found.    No lab results found.    The longitudinal plan of care for the diagnosis(es)/condition(s)  as documented were addressed during this visit. Due to the added complexity in care, I will continue to support Felix in the subsequent management and with ongoing continuity of care.

## 2024-05-24 DIAGNOSIS — C90.00 MULTIPLE MYELOMA, REMISSION STATUS UNSPECIFIED (H): Primary | ICD-10-CM

## 2024-05-24 LAB — CULTURE HARVEST COMPLETE DATE: NORMAL

## 2024-05-28 LAB
CULTURE HARVEST COMPLETE DATE: NORMAL
INTERPRETATION: NORMAL

## 2024-05-30 LAB — INTERPRETATION: NORMAL

## 2024-06-01 ENCOUNTER — HEALTH MAINTENANCE LETTER (OUTPATIENT)
Age: 64
End: 2024-06-01

## 2024-06-01 LAB
ADDITIONAL COMMENTS: NORMAL
INTERPRETATION: NORMAL
ISCN: NORMAL
METHODS: NORMAL
PATH REPORT.COMMENTS IMP SPEC: NORMAL

## 2024-06-06 DIAGNOSIS — C90.00 MULTIPLE MYELOMA, REMISSION STATUS UNSPECIFIED (H): Primary | ICD-10-CM

## 2024-06-06 RX ORDER — HEPARIN SODIUM (PORCINE) LOCK FLUSH IV SOLN 100 UNIT/ML 100 UNIT/ML
5 SOLUTION INTRAVENOUS
Status: CANCELLED | OUTPATIENT
Start: 2024-07-16

## 2024-06-06 RX ORDER — MEPERIDINE HYDROCHLORIDE 25 MG/ML
25 INJECTION INTRAMUSCULAR; INTRAVENOUS; SUBCUTANEOUS EVERY 30 MIN PRN
Status: CANCELLED | OUTPATIENT
Start: 2024-07-16

## 2024-06-06 RX ORDER — LABETALOL HYDROCHLORIDE 5 MG/ML
10-20 INJECTION, SOLUTION INTRAVENOUS
Status: CANCELLED | OUTPATIENT
Start: 2024-07-17

## 2024-06-06 RX ORDER — ALBUTEROL SULFATE 90 UG/1
1-2 AEROSOL, METERED RESPIRATORY (INHALATION)
Status: CANCELLED
Start: 2024-07-16

## 2024-06-06 RX ORDER — HEPARIN SODIUM,PORCINE 10 UNIT/ML
5-20 VIAL (ML) INTRAVENOUS DAILY PRN
Status: CANCELLED | OUTPATIENT
Start: 2024-07-16

## 2024-06-06 RX ORDER — HEPARIN SODIUM,PORCINE 10 UNIT/ML
5-20 VIAL (ML) INTRAVENOUS DAILY PRN
Status: CANCELLED | OUTPATIENT
Start: 2024-07-17

## 2024-06-06 RX ORDER — HEPARIN SODIUM (PORCINE) LOCK FLUSH IV SOLN 100 UNIT/ML 100 UNIT/ML
5 SOLUTION INTRAVENOUS
Status: CANCELLED | OUTPATIENT
Start: 2024-07-17

## 2024-06-06 RX ORDER — MEPERIDINE HYDROCHLORIDE 25 MG/ML
25-50 INJECTION INTRAMUSCULAR; INTRAVENOUS; SUBCUTANEOUS
Status: CANCELLED | OUTPATIENT
Start: 2024-07-17 | End: 2024-07-18

## 2024-06-06 RX ORDER — PALONOSETRON 0.05 MG/ML
0.25 INJECTION, SOLUTION INTRAVENOUS ONCE
Status: CANCELLED
Start: 2024-07-16

## 2024-06-06 RX ORDER — EPINEPHRINE 1 MG/ML
0.3 INJECTION, SOLUTION, CONCENTRATE INTRAVENOUS EVERY 5 MIN PRN
Status: CANCELLED | OUTPATIENT
Start: 2024-07-16

## 2024-06-06 RX ORDER — ALBUTEROL SULFATE 0.83 MG/ML
2.5 SOLUTION RESPIRATORY (INHALATION)
Status: CANCELLED | OUTPATIENT
Start: 2024-07-16

## 2024-06-06 RX ORDER — DIPHENHYDRAMINE HCL 25 MG
25 CAPSULE ORAL ONCE
Status: CANCELLED
Start: 2024-07-17 | End: 2024-07-17

## 2024-06-06 RX ORDER — ACETAMINOPHEN 325 MG/1
650 TABLET ORAL ONCE
Status: CANCELLED
Start: 2024-07-17 | End: 2024-07-17

## 2024-06-06 RX ORDER — DIPHENHYDRAMINE HYDROCHLORIDE 50 MG/ML
50 INJECTION INTRAMUSCULAR; INTRAVENOUS
Status: CANCELLED
Start: 2024-07-16

## 2024-06-06 RX ORDER — LORAZEPAM 2 MG/ML
0.5 INJECTION INTRAMUSCULAR EVERY 4 HOURS PRN
Status: CANCELLED | OUTPATIENT
Start: 2024-07-16

## 2024-06-10 ENCOUNTER — TELEPHONE (OUTPATIENT)
Dept: TRANSPLANT | Facility: CLINIC | Age: 64
End: 2024-06-10

## 2024-06-10 DIAGNOSIS — C90.00 MULTIPLE MYELOMA, REMISSION STATUS UNSPECIFIED (H): Primary | ICD-10-CM

## 2024-06-10 RX ORDER — HEPARIN SODIUM,PORCINE 10 UNIT/ML
5-20 VIAL (ML) INTRAVENOUS DAILY PRN
Status: CANCELLED | OUTPATIENT
Start: 2024-06-10

## 2024-06-10 RX ORDER — HEPARIN SODIUM (PORCINE) LOCK FLUSH IV SOLN 100 UNIT/ML 100 UNIT/ML
5 SOLUTION INTRAVENOUS
Status: CANCELLED | OUTPATIENT
Start: 2024-06-10

## 2024-06-10 NOTE — TELEPHONE ENCOUNTER
Called patients' wife, Comfort, to go over Felix's upcoming schedule for collections. Will also get patient set up for labs/BRANDON visit per Dr. Carlin in the next week or two.

## 2024-06-17 DIAGNOSIS — C90.00 MULTIPLE MYELOMA, REMISSION STATUS UNSPECIFIED (H): Primary | ICD-10-CM

## 2024-06-18 ENCOUNTER — ONCOLOGY VISIT (OUTPATIENT)
Dept: TRANSPLANT | Facility: CLINIC | Age: 64
End: 2024-06-18
Attending: INTERNAL MEDICINE
Payer: COMMERCIAL

## 2024-06-18 ENCOUNTER — APPOINTMENT (OUTPATIENT)
Dept: LAB | Facility: CLINIC | Age: 64
End: 2024-06-18
Attending: INTERNAL MEDICINE
Payer: COMMERCIAL

## 2024-06-18 VITALS
DIASTOLIC BLOOD PRESSURE: 84 MMHG | TEMPERATURE: 98 F | BODY MASS INDEX: 32.73 KG/M2 | WEIGHT: 233.8 LBS | HEART RATE: 67 BPM | SYSTOLIC BLOOD PRESSURE: 164 MMHG | OXYGEN SATURATION: 98 % | RESPIRATION RATE: 16 BRPM

## 2024-06-18 DIAGNOSIS — C90.00 MULTIPLE MYELOMA, REMISSION STATUS UNSPECIFIED (H): ICD-10-CM

## 2024-06-18 LAB
ALBUMIN SERPL BCG-MCNC: 4.6 G/DL (ref 3.5–5.2)
ALBUMIN UR-MCNC: 10 MG/DL
ALP SERPL-CCNC: 74 U/L (ref 40–150)
ALT SERPL W P-5'-P-CCNC: 21 U/L (ref 0–70)
ANION GAP SERPL CALCULATED.3IONS-SCNC: 13 MMOL/L (ref 7–15)
APPEARANCE UR: CLEAR
AST SERPL W P-5'-P-CCNC: 25 U/L (ref 0–45)
B2 MICROGLOB TUMOR MARKER SER-MCNC: 1.7 MG/L
BASOPHILS # BLD AUTO: 0 10E3/UL (ref 0–0.2)
BASOPHILS NFR BLD AUTO: 0 %
BILIRUB SERPL-MCNC: 0.2 MG/DL
BILIRUB UR QL STRIP: NEGATIVE
BUN SERPL-MCNC: 18.2 MG/DL (ref 8–23)
CALCIUM SERPL-MCNC: 9.9 MG/DL (ref 8.8–10.2)
CHLORIDE SERPL-SCNC: 104 MMOL/L (ref 98–107)
COLOR UR AUTO: ABNORMAL
CREAT SERPL-MCNC: 0.82 MG/DL (ref 0.67–1.17)
CREAT SERPL-MCNC: 0.82 MG/DL (ref 0.67–1.17)
DEPRECATED HCO3 PLAS-SCNC: 23 MMOL/L (ref 22–29)
EGFRCR SERPLBLD CKD-EPI 2021: >90 ML/MIN/1.73M2
EOSINOPHIL # BLD AUTO: 0.1 10E3/UL (ref 0–0.7)
EOSINOPHIL NFR BLD AUTO: 2 %
ERYTHROCYTE [DISTWIDTH] IN BLOOD BY AUTOMATED COUNT: 13.4 % (ref 10–15)
GLUCOSE SERPL-MCNC: 110 MG/DL (ref 70–99)
GLUCOSE UR STRIP-MCNC: NEGATIVE MG/DL
HCT VFR BLD AUTO: 41.9 % (ref 40–53)
HGB BLD-MCNC: 14.1 G/DL (ref 13.3–17.7)
HGB UR QL STRIP: NEGATIVE
IGA SERPL-MCNC: 97 MG/DL (ref 84–499)
IGE SERPL-ACNC: 10 KU/L (ref 0–114)
IGG SERPL-MCNC: 1226 MG/DL (ref 610–1616)
IGM SERPL-MCNC: 28 MG/DL (ref 35–242)
IMM GRANULOCYTES # BLD: 0 10E3/UL
IMM GRANULOCYTES NFR BLD: 0 %
KAPPA LC FREE SER-MCNC: 1.62 MG/DL (ref 0.33–1.94)
KAPPA LC FREE/LAMBDA FREE SER NEPH: 3.06 {RATIO} (ref 0.26–1.65)
KETONES UR STRIP-MCNC: NEGATIVE MG/DL
LAMBDA LC FREE SERPL-MCNC: 0.53 MG/DL (ref 0.57–2.63)
LEUKOCYTE ESTERASE UR QL STRIP: NEGATIVE
LYMPHOCYTES # BLD AUTO: 1.2 10E3/UL (ref 0.8–5.3)
LYMPHOCYTES NFR BLD AUTO: 18 %
MAGNESIUM SERPL-MCNC: 2 MG/DL (ref 1.7–2.3)
MCH RBC QN AUTO: 31.5 PG (ref 26.5–33)
MCHC RBC AUTO-ENTMCNC: 33.7 G/DL (ref 31.5–36.5)
MCV RBC AUTO: 94 FL (ref 78–100)
MONOCYTES # BLD AUTO: 0.7 10E3/UL (ref 0–1.3)
MONOCYTES NFR BLD AUTO: 11 %
MUCOUS THREADS #/AREA URNS LPF: PRESENT /LPF
NEUTROPHILS # BLD AUTO: 4.6 10E3/UL (ref 1.6–8.3)
NEUTROPHILS NFR BLD AUTO: 69 %
NITRATE UR QL: NEGATIVE
NRBC # BLD AUTO: 0 10E3/UL
NRBC BLD AUTO-RTO: 0 /100
PH UR STRIP: 5.5 [PH] (ref 5–7)
PHOSPHATE SERPL-MCNC: 3.3 MG/DL (ref 2.5–4.5)
PLATELET # BLD AUTO: 222 10E3/UL (ref 150–450)
POTASSIUM SERPL-SCNC: 4.3 MMOL/L (ref 3.4–5.3)
PROT SERPL-MCNC: 7.7 G/DL (ref 6.4–8.3)
RBC # BLD AUTO: 4.47 10E6/UL (ref 4.4–5.9)
RBC URINE: 1 /HPF
SODIUM SERPL-SCNC: 140 MMOL/L (ref 135–145)
SP GR UR STRIP: 1.02 (ref 1–1.03)
TOTAL PROTEIN SERUM FOR ELP: 7.6 G/DL (ref 6.4–8.3)
URATE SERPL-MCNC: 4 MG/DL (ref 3.4–7)
UROBILINOGEN UR STRIP-MCNC: NORMAL MG/DL
WBC # BLD AUTO: 6.6 10E3/UL (ref 4–11)
WBC URINE: 2 /HPF

## 2024-06-18 PROCEDURE — 83521 IG LIGHT CHAINS FREE EACH: CPT

## 2024-06-18 PROCEDURE — 86803 HEPATITIS C AB TEST: CPT

## 2024-06-18 PROCEDURE — 87516 HEPATITIS B DNA AMP PROBE: CPT

## 2024-06-18 PROCEDURE — 83735 ASSAY OF MAGNESIUM: CPT

## 2024-06-18 PROCEDURE — 86335 IMMUNFIX E-PHORSIS/URINE/CSF: CPT | Mod: 26 | Performed by: PATHOLOGY

## 2024-06-18 PROCEDURE — 82784 ASSAY IGA/IGD/IGG/IGM EACH: CPT

## 2024-06-18 PROCEDURE — 85025 COMPLETE CBC W/AUTO DIFF WBC: CPT

## 2024-06-18 PROCEDURE — 36415 COLL VENOUS BLD VENIPUNCTURE: CPT

## 2024-06-18 PROCEDURE — 84165 PROTEIN E-PHORESIS SERUM: CPT | Mod: 26 | Performed by: PATHOLOGY

## 2024-06-18 PROCEDURE — 84155 ASSAY OF PROTEIN SERUM: CPT

## 2024-06-18 PROCEDURE — 86334 IMMUNOFIX E-PHORESIS SERUM: CPT | Performed by: PATHOLOGY

## 2024-06-18 PROCEDURE — 84550 ASSAY OF BLOOD/URIC ACID: CPT

## 2024-06-18 PROCEDURE — 86334 IMMUNOFIX E-PHORESIS SERUM: CPT | Mod: 26 | Performed by: PATHOLOGY

## 2024-06-18 PROCEDURE — 81001 URINALYSIS AUTO W/SCOPE: CPT

## 2024-06-18 PROCEDURE — 84100 ASSAY OF PHOSPHORUS: CPT

## 2024-06-18 PROCEDURE — 99213 OFFICE O/P EST LOW 20 MIN: CPT

## 2024-06-18 PROCEDURE — 82232 ASSAY OF BETA-2 PROTEIN: CPT

## 2024-06-18 PROCEDURE — G0463 HOSPITAL OUTPT CLINIC VISIT: HCPCS

## 2024-06-18 PROCEDURE — 82785 ASSAY OF IGE: CPT

## 2024-06-18 PROCEDURE — 80053 COMPREHEN METABOLIC PANEL: CPT

## 2024-06-18 PROCEDURE — 86665 EPSTEIN-BARR CAPSID VCA: CPT

## 2024-06-18 PROCEDURE — 82565 ASSAY OF CREATININE: CPT

## 2024-06-18 PROCEDURE — 86777 TOXOPLASMA ANTIBODY: CPT

## 2024-06-18 PROCEDURE — 86696 HERPES SIMPLEX TYPE 2 TEST: CPT

## 2024-06-18 PROCEDURE — 86335 IMMUNFIX E-PHORSIS/URINE/CSF: CPT | Performed by: PATHOLOGY

## 2024-06-18 PROCEDURE — 84165 PROTEIN E-PHORESIS SERUM: CPT | Mod: TC | Performed by: PATHOLOGY

## 2024-06-18 ASSESSMENT — PAIN SCALES - GENERAL: PAINLEVEL: NO PAIN (0)

## 2024-06-18 NOTE — NURSING NOTE
"Oncology Rooming Note    June 18, 2024 7:43 AM   Inocente Romero is a 64 year old male who presents for:    Chief Complaint   Patient presents with    Blood Draw     Labs drawn via  by RN in lab.  VS taken    Oncology Clinic Visit     Multiple Myeloma      Initial Vitals: BP (!) 164/84   Pulse 67   Temp 98  F (36.7  C) (Oral)   Resp 16   Wt 106.1 kg (233 lb 12.8 oz)   SpO2 98%   BMI 32.73 kg/m   Estimated body mass index is 32.73 kg/m  as calculated from the following:    Height as of 5/16/24: 1.8 m (5' 10.87\").    Weight as of this encounter: 106.1 kg (233 lb 12.8 oz). Body surface area is 2.3 meters squared.  No Pain (0) Comment: Data Unavailable   No LMP for male patient.  Allergies reviewed: Yes  Medications reviewed: Yes    Medications: Medication refills not needed today.  Pharmacy name entered into [employer]: [facility] [address]    Frailty Screening:   Is the patient here for a new oncology consult visit in cancer care? 2. No      Clinical concerns:  none      Zina Machado              "

## 2024-06-18 NOTE — NURSING NOTE
Chief Complaint   Patient presents with    Blood Draw     Labs drawn via  by RN in lab.  VS taken       Labs collected from venipuncture by RN. Vitals taken. Checked in for appointment(s).    Miryam Mcintyre RN

## 2024-06-18 NOTE — LETTER
6/18/2024      Inocente Romero  [address] [facility] [facility] [id number]      Dear Colleague,    Thank you for referring your patient, Inocente Romero, to the [facility]LOOD AND MARROW [facility]ANSPLANT [facility] [place]. Please see a copy of my visit note below.    S: No acute medical complaints today. Feeling well.     O:  General: NAD  HEENT: Sclera anicteric  CV: RRR  Pulm: BCTA  Lymph: +trace BLE edema    A&P:    1.) MM  - pre collections.   - setup to start GCSF 7/5 with line placed on day 4  - labs look good today.   - MM labs/CrCl pending messaged care team to follow those if any change in plan needed.   - proceed as planned.    20 minutes spent on the date of the encounter doing chart review, review of test results, interpretation of tests, patient visit, and documentation     Gabriela Magaña PA-C  [phone]

## 2024-06-18 NOTE — PROGRESS NOTES
S: No acute medical complaints today. Feeling well.     O:  General: NAD  HEENT: Sclera anicteric  CV: RRR  Pulm: BCTA  Lymph: +trace BLE edema    A&P:    1.) MM  - pre collections.   - setup to start GCSF 7/5 with line placed on day 4  - labs look good today.   - MM labs/CrCl pending messaged care team to follow those if any change in plan needed.   - proceed as planned.    20 minutes spent on the date of the encounter doing chart review, review of test results, interpretation of tests, patient visit, and documentation     Gabriela Magaña PA-C  [phone]

## 2024-06-19 LAB
ALBUMIN SERPL ELPH-MCNC: 4.7 G/DL (ref 3.7–5.1)
ALPHA1 GLOB SERPL ELPH-MCNC: 0.2 G/DL (ref 0.2–0.4)
ALPHA2 GLOB SERPL ELPH-MCNC: 0.8 G/DL (ref 0.5–0.9)
B-GLOBULIN SERPL ELPH-MCNC: 0.7 G/DL (ref 0.6–1)
DONOR CYTOMEGALOVIRUS ABY: NORMAL
DONOR HEP B CORE ABY: NORMAL
DONOR HEP B SURF AGN: NORMAL
DONOR HEPATITIS C ABY: NORMAL
DONOR HTLV 1&2 ANTIBODY: NORMAL
DONOR TREPONEMA PAL ABY: NORMAL
EBV VCA IGG SER IA-ACNC: 278 U/ML
EBV VCA IGG SER IA-ACNC: POSITIVE
GAMMA GLOB SERPL ELPH-MCNC: 1.2 G/DL (ref 0.7–1.6)
HBV DNA SERPL QL NAA+PROBE: NORMAL
HCV RNA SERPL QL NAA+PROBE: NORMAL
HIV1+2 AB SERPL QL IA: NORMAL
HIV1+2 RNA SERPL QL NAA+PROBE: NORMAL
HSV1 IGG SERPL QL IA: 35 INDEX
HSV1 IGG SERPL QL IA: ABNORMAL
HSV2 IGG SERPL QL IA: 0.03 INDEX
HSV2 IGG SERPL QL IA: ABNORMAL
M PROTEIN SERPL ELPH-MCNC: 0.8 G/DL
PATH REPORT.COMMENTS IMP SPEC: ABNORMAL
PATH REPORT.COMMENTS IMP SPEC: NORMAL
PATH REPORT.COMMENTS IMP SPEC: NORMAL
PROT ELPH PNL UR ELPH: NORMAL
PROT PATTERN SERPL ELPH-IMP: ABNORMAL
PROT PATTERN SERPL IFE-IMP: NORMAL
TRYPANOSOMA CRUZI: NORMAL
WNV RNA SERPL DONR QL NAA+PROBE: NORMAL

## 2024-06-20 LAB
IGD SER-MCNC: <1.3 MG/DL
T GONDII IGG SER-ACNC: <3 IU/ML
T GONDII IGM SER-ACNC: <3 AU/ML

## 2024-06-24 DIAGNOSIS — C90.00 MULTIPLE MYELOMA, REMISSION STATUS UNSPECIFIED (H): Primary | ICD-10-CM

## 2024-07-05 ENCOUNTER — INFUSION THERAPY VISIT (OUTPATIENT)
Dept: TRANSPLANT | Facility: CLINIC | Age: 64
End: 2024-07-05
Attending: INTERNAL MEDICINE
Payer: COMMERCIAL

## 2024-07-05 VITALS
BODY MASS INDEX: 32.65 KG/M2 | DIASTOLIC BLOOD PRESSURE: 94 MMHG | SYSTOLIC BLOOD PRESSURE: 151 MMHG | HEART RATE: 68 BPM | TEMPERATURE: 97.7 F | RESPIRATION RATE: 18 BRPM | WEIGHT: 233.2 LBS

## 2024-07-05 DIAGNOSIS — C90.00 MULTIPLE MYELOMA, REMISSION STATUS UNSPECIFIED (H): Primary | ICD-10-CM

## 2024-07-05 PROCEDURE — 96372 THER/PROPH/DIAG INJ SC/IM: CPT | Performed by: INTERNAL MEDICINE

## 2024-07-05 PROCEDURE — 250N000011 HC RX IP 250 OP 636: Performed by: INTERNAL MEDICINE

## 2024-07-05 RX ORDER — HEPARIN SODIUM,PORCINE 10 UNIT/ML
5 VIAL (ML) INTRAVENOUS PRN
Qty: 300 ML | Refills: 1 | Status: SHIPPED | OUTPATIENT
Start: 2024-07-05 | End: 2024-08-15

## 2024-07-05 RX ORDER — HEPARIN SODIUM (PORCINE) LOCK FLUSH IV SOLN 100 UNIT/ML 100 UNIT/ML
5 SOLUTION INTRAVENOUS
Status: CANCELLED | OUTPATIENT
Start: 2024-07-06

## 2024-07-05 RX ORDER — HEPARIN SODIUM,PORCINE 10 UNIT/ML
5-20 VIAL (ML) INTRAVENOUS DAILY PRN
Status: CANCELLED | OUTPATIENT
Start: 2024-07-06

## 2024-07-05 RX ADMIN — FILGRASTIM-SNDZ 1080 MCG: 480 INJECTION, SOLUTION INTRAVENOUS; SUBCUTANEOUS at 08:19

## 2024-07-05 ASSESSMENT — PAIN SCALES - GENERAL: PAINLEVEL: NO PAIN (0)

## 2024-07-05 NOTE — PROGRESS NOTES
First dose GCSF Nursing Note:  Inocente Romero presents today for GCSF injection.    Patient seen by provider today: No   present during visit today: Not Applicable.    Pregnancy Status: Pregnancy Status: Not Applicable - male patient    Note: Pt here today for first dose GCSF prior to collections. Meds and allergies reviewed. VSS. Pt reports feeling well today and denies pain, fever/chills, bleeding, n/v/d, or respiratory symptoms. See assessment flowsheet for details. Pt was educated on medication side effects/symptoms. All questions answered and pt verbalized understanding. Injection was administered in RLQ of abdomen. Pt was monitored for 15 minutes post injection.     Post Injection Assessment:  Patient tolerated injection without incident.     Discharge Plan:   Patient discharged in stable condition accompanied by: self.  Departure Mode: Ambulatory.  Will RTC tomorrow.      Noemí Gleason RN

## 2024-07-06 ENCOUNTER — INFUSION THERAPY VISIT (OUTPATIENT)
Dept: TRANSPLANT | Facility: CLINIC | Age: 64
End: 2024-07-06
Attending: INTERNAL MEDICINE
Payer: COMMERCIAL

## 2024-07-06 VITALS
DIASTOLIC BLOOD PRESSURE: 74 MMHG | TEMPERATURE: 98.1 F | OXYGEN SATURATION: 96 % | SYSTOLIC BLOOD PRESSURE: 144 MMHG | HEART RATE: 82 BPM | RESPIRATION RATE: 16 BRPM

## 2024-07-06 DIAGNOSIS — C90.00 MULTIPLE MYELOMA, REMISSION STATUS UNSPECIFIED (H): Primary | ICD-10-CM

## 2024-07-06 PROCEDURE — 96372 THER/PROPH/DIAG INJ SC/IM: CPT | Performed by: INTERNAL MEDICINE

## 2024-07-06 PROCEDURE — 250N000011 HC RX IP 250 OP 636: Performed by: INTERNAL MEDICINE

## 2024-07-06 RX ORDER — HEPARIN SODIUM (PORCINE) LOCK FLUSH IV SOLN 100 UNIT/ML 100 UNIT/ML
5 SOLUTION INTRAVENOUS
Status: CANCELLED | OUTPATIENT
Start: 2024-07-07

## 2024-07-06 RX ORDER — HEPARIN SODIUM,PORCINE 10 UNIT/ML
5-20 VIAL (ML) INTRAVENOUS DAILY PRN
Status: CANCELLED | OUTPATIENT
Start: 2024-07-07

## 2024-07-06 RX ADMIN — FILGRASTIM-SNDZ 1080 MCG: 480 INJECTION, SOLUTION INTRAVENOUS; SUBCUTANEOUS at 08:11

## 2024-07-06 ASSESSMENT — PAIN SCALES - GENERAL: PAINLEVEL: NO PAIN (0)

## 2024-07-06 NOTE — PROGRESS NOTES
Infusion Nursing Note:  Inocente Romero presents today for   Chief Complaint   Patient presents with    Oncology Clinic Visit     Pre bmt for MM here for 2nd GCSF inj     .    Patient seen by provider today: No   present during visit today: Not Applicable.    Note: tolerating inj well, will start claritin today.      Intravenous Access:  No Intravenous access/labs at this visit.    Treatment Conditions:  Not Applicable.      Post Infusion Assessment:  Patient tolerated injection without incident.       Discharge Plan:   Patient and/or family verbalized understanding of discharge instructions and all questions answered.      Abbie Smyth RN

## 2024-07-07 ENCOUNTER — INFUSION THERAPY VISIT (OUTPATIENT)
Dept: TRANSPLANT | Facility: CLINIC | Age: 64
End: 2024-07-07
Attending: INTERNAL MEDICINE
Payer: COMMERCIAL

## 2024-07-07 VITALS
HEART RATE: 79 BPM | RESPIRATION RATE: 16 BRPM | OXYGEN SATURATION: 96 % | TEMPERATURE: 98.2 F | DIASTOLIC BLOOD PRESSURE: 77 MMHG | SYSTOLIC BLOOD PRESSURE: 140 MMHG

## 2024-07-07 DIAGNOSIS — C90.00 MULTIPLE MYELOMA, REMISSION STATUS UNSPECIFIED (H): Primary | ICD-10-CM

## 2024-07-07 PROCEDURE — 96372 THER/PROPH/DIAG INJ SC/IM: CPT | Performed by: INTERNAL MEDICINE

## 2024-07-07 PROCEDURE — 250N000011 HC RX IP 250 OP 636: Performed by: INTERNAL MEDICINE

## 2024-07-07 RX ORDER — HEPARIN SODIUM (PORCINE) LOCK FLUSH IV SOLN 100 UNIT/ML 100 UNIT/ML
5 SOLUTION INTRAVENOUS
Status: CANCELLED | OUTPATIENT
Start: 2024-07-08

## 2024-07-07 RX ORDER — HEPARIN SODIUM,PORCINE 10 UNIT/ML
5-20 VIAL (ML) INTRAVENOUS DAILY PRN
Status: CANCELLED | OUTPATIENT
Start: 2024-07-08

## 2024-07-07 RX ADMIN — FILGRASTIM-SNDZ 1080 MCG: 480 INJECTION, SOLUTION INTRAVENOUS; SUBCUTANEOUS at 08:43

## 2024-07-07 ASSESSMENT — PAIN SCALES - GENERAL: PAINLEVEL: NO PAIN (0)

## 2024-07-07 NOTE — PROGRESS NOTES
Infusion Nursing Note:  Ioncente Romero presents today for GCSF.    Patient seen by provider today: No   present during visit today: Not Applicable.    Note: No labs/no provider today. Patient states he experienced bone pain in tailbone last night but it has resolved; taking Claritin; GCSF given subcutaneously in RLQ of adbomen without complication. See MAR for details.    Intravenous Access:  No Intravenous access/labs at this visit.    Treatment Conditions:  Not Applicable.      Post Infusion Assessment:  Patient tolerated injection without incident.       Discharge Plan:   Patient discharged in stable condition accompanied by: self.  Departure Mode: Ambulatory.      Noemí Gleason RN

## 2024-07-08 ENCOUNTER — ANCILLARY PROCEDURE (OUTPATIENT)
Dept: RADIOLOGY | Facility: AMBULATORY SURGERY CENTER | Age: 64
End: 2024-07-08
Attending: INTERNAL MEDICINE
Payer: COMMERCIAL

## 2024-07-08 ENCOUNTER — ALLIED HEALTH/NURSE VISIT (OUTPATIENT)
Dept: TRANSPLANT | Facility: CLINIC | Age: 64
End: 2024-07-08
Attending: PHYSICIAN ASSISTANT
Payer: COMMERCIAL

## 2024-07-08 ENCOUNTER — HOSPITAL ENCOUNTER (OUTPATIENT)
Facility: AMBULATORY SURGERY CENTER | Age: 64
Discharge: HOME | End: 2024-07-08
Attending: RADIOLOGY | Admitting: RADIOLOGY
Payer: COMMERCIAL

## 2024-07-08 ENCOUNTER — INFUSION THERAPY VISIT (OUTPATIENT)
Dept: TRANSPLANT | Facility: CLINIC | Age: 64
End: 2024-07-08
Attending: INTERNAL MEDICINE
Payer: COMMERCIAL

## 2024-07-08 VITALS
BODY MASS INDEX: 32.2 KG/M2 | TEMPERATURE: 96.7 F | SYSTOLIC BLOOD PRESSURE: 155 MMHG | HEART RATE: 70 BPM | RESPIRATION RATE: 16 BRPM | HEIGHT: 71 IN | OXYGEN SATURATION: 95 % | WEIGHT: 230 LBS | DIASTOLIC BLOOD PRESSURE: 88 MMHG

## 2024-07-08 VITALS
OXYGEN SATURATION: 95 % | RESPIRATION RATE: 16 BRPM | BODY MASS INDEX: 32.2 KG/M2 | TEMPERATURE: 96.7 F | HEIGHT: 71 IN | SYSTOLIC BLOOD PRESSURE: 155 MMHG | DIASTOLIC BLOOD PRESSURE: 88 MMHG | WEIGHT: 230 LBS | HEART RATE: 70 BPM

## 2024-07-08 DIAGNOSIS — C90.00 MULTIPLE MYELOMA, REMISSION STATUS UNSPECIFIED (H): Primary | ICD-10-CM

## 2024-07-08 DIAGNOSIS — C90.00 MULTIPLE MYELOMA, REMISSION STATUS UNSPECIFIED (H): ICD-10-CM

## 2024-07-08 LAB
BASOPHILS # BLD AUTO: ABNORMAL 10*3/UL
BASOPHILS # BLD MANUAL: 0 10E3/UL (ref 0–0.2)
BASOPHILS NFR BLD AUTO: ABNORMAL %
BASOPHILS NFR BLD MANUAL: 0 %
CD34 ABSOLUTE COUNT COMMENT: NORMAL
CD34 CELLS # SPEC: 11 CELLS/UL
CD34 CELLS NFR SPEC: 0.03 %
EOSINOPHIL # BLD AUTO: ABNORMAL 10*3/UL
EOSINOPHIL # BLD MANUAL: 0 10E3/UL (ref 0–0.7)
EOSINOPHIL NFR BLD AUTO: ABNORMAL %
EOSINOPHIL NFR BLD MANUAL: 0 %
ERYTHROCYTE [DISTWIDTH] IN BLOOD BY AUTOMATED COUNT: 13.6 % (ref 10–15)
HCT VFR BLD AUTO: 40.8 % (ref 40–53)
HGB BLD-MCNC: 13.9 G/DL (ref 13.3–17.7)
IMM GRANULOCYTES # BLD: ABNORMAL 10*3/UL
IMM GRANULOCYTES NFR BLD: ABNORMAL %
INR PPP: 1.05 (ref 0.85–1.15)
LYMPHOCYTES # BLD AUTO: ABNORMAL 10*3/UL
LYMPHOCYTES # BLD MANUAL: 4.9 10E3/UL (ref 0.8–5.3)
LYMPHOCYTES NFR BLD AUTO: ABNORMAL %
LYMPHOCYTES NFR BLD MANUAL: 13 %
MCH RBC QN AUTO: 32.3 PG (ref 26.5–33)
MCHC RBC AUTO-ENTMCNC: 34.1 G/DL (ref 31.5–36.5)
MCV RBC AUTO: 95 FL (ref 78–100)
MONOCYTES # BLD AUTO: ABNORMAL 10*3/UL
MONOCYTES # BLD MANUAL: 1.5 10E3/UL (ref 0–1.3)
MONOCYTES NFR BLD AUTO: ABNORMAL %
MONOCYTES NFR BLD MANUAL: 4 %
MYELOCYTES # BLD MANUAL: 0.4 10E3/UL
MYELOCYTES NFR BLD MANUAL: 1 %
NEUTROPHILS # BLD AUTO: ABNORMAL 10*3/UL
NEUTROPHILS # BLD MANUAL: 31 10E3/UL (ref 1.6–8.3)
NEUTROPHILS NFR BLD AUTO: ABNORMAL %
NEUTROPHILS NFR BLD MANUAL: 82 %
NRBC # BLD AUTO: 0 10E3/UL
NRBC BLD AUTO-RTO: 0 /100
PLAT MORPH BLD: ABNORMAL
PLATELET # BLD AUTO: 173 10E3/UL (ref 150–450)
PRODUCT NUMBER FLOW CYTOMETRY: NORMAL
RBC # BLD AUTO: 4.31 10E6/UL (ref 4.4–5.9)
RBC MORPH BLD: ABNORMAL
VIABLE CD34 CELLS NFR FLD: 93.63 %
WBC # BLD AUTO: 37.8 10E3/UL (ref 4–11)

## 2024-07-08 PROCEDURE — 99152 MOD SED SAME PHYS/QHP 5/>YRS: CPT | Mod: GC | Performed by: RADIOLOGY

## 2024-07-08 PROCEDURE — 99000 SPECIMEN HANDLING OFFICE-LAB: CPT | Performed by: PATHOLOGY

## 2024-07-08 PROCEDURE — 99213 OFFICE O/P EST LOW 20 MIN: CPT

## 2024-07-08 PROCEDURE — 36558 INSERT TUNNELED CV CATH: CPT

## 2024-07-08 PROCEDURE — 77001 FLUOROGUIDE FOR VEIN DEVICE: CPT | Mod: 26 | Performed by: RADIOLOGY

## 2024-07-08 PROCEDURE — 250N000011 HC RX IP 250 OP 636: Performed by: INTERNAL MEDICINE

## 2024-07-08 PROCEDURE — 250N000013 HC RX MED GY IP 250 OP 250 PS 637: Performed by: PHYSICIAN ASSISTANT

## 2024-07-08 PROCEDURE — G0463 HOSPITAL OUTPT CLINIC VISIT: HCPCS

## 2024-07-08 PROCEDURE — 96372 THER/PROPH/DIAG INJ SC/IM: CPT | Performed by: PHYSICIAN ASSISTANT

## 2024-07-08 PROCEDURE — 85610 PROTHROMBIN TIME: CPT | Performed by: PATHOLOGY

## 2024-07-08 PROCEDURE — 36558 INSERT TUNNELED CV CATH: CPT | Mod: GC | Performed by: RADIOLOGY

## 2024-07-08 PROCEDURE — 250N000011 HC RX IP 250 OP 636: Mod: JZ | Performed by: PHYSICIAN ASSISTANT

## 2024-07-08 PROCEDURE — 85007 BL SMEAR W/DIFF WBC COUNT: CPT | Performed by: PATHOLOGY

## 2024-07-08 PROCEDURE — 96372 THER/PROPH/DIAG INJ SC/IM: CPT | Performed by: INTERNAL MEDICINE

## 2024-07-08 PROCEDURE — 86367 STEM CELLS TOTAL COUNT: CPT | Performed by: INTERNAL MEDICINE

## 2024-07-08 PROCEDURE — 85027 COMPLETE CBC AUTOMATED: CPT | Performed by: PATHOLOGY

## 2024-07-08 PROCEDURE — 76937 US GUIDE VASCULAR ACCESS: CPT | Mod: 26 | Performed by: RADIOLOGY

## 2024-07-08 RX ORDER — LIDOCAINE 40 MG/G
CREAM TOPICAL
Status: DISCONTINUED | OUTPATIENT
Start: 2024-07-08 | End: 2024-07-09 | Stop reason: HOSPADM

## 2024-07-08 RX ORDER — PLERIXAFOR 24 MG/1.2ML
0.24 SOLUTION SUBCUTANEOUS DAILY
Status: CANCELLED
Start: 2024-07-09

## 2024-07-08 RX ORDER — HEPARIN SODIUM (PORCINE) LOCK FLUSH IV SOLN 100 UNIT/ML 100 UNIT/ML
3 SOLUTION INTRAVENOUS EVERY 24 HOURS
Status: DISCONTINUED | OUTPATIENT
Start: 2024-07-08 | End: 2024-07-09 | Stop reason: HOSPADM

## 2024-07-08 RX ORDER — PLERIXAFOR 24 MG/1.2ML
24 SOLUTION SUBCUTANEOUS DAILY
Status: DISCONTINUED | OUTPATIENT
Start: 2024-07-08 | End: 2024-07-08 | Stop reason: HOSPADM

## 2024-07-08 RX ORDER — SODIUM CHLORIDE 9 MG/ML
INJECTION, SOLUTION INTRAVENOUS CONTINUOUS
Status: DISCONTINUED | OUTPATIENT
Start: 2024-07-08 | End: 2024-07-09 | Stop reason: HOSPADM

## 2024-07-08 RX ORDER — HEPARIN SODIUM,PORCINE 10 UNIT/ML
5-20 VIAL (ML) INTRAVENOUS DAILY PRN
Status: CANCELLED | OUTPATIENT
Start: 2024-07-09

## 2024-07-08 RX ORDER — HEPARIN SODIUM (PORCINE) LOCK FLUSH IV SOLN 100 UNIT/ML 100 UNIT/ML
5 SOLUTION INTRAVENOUS
Status: CANCELLED | OUTPATIENT
Start: 2024-07-09

## 2024-07-08 RX ORDER — FENTANYL CITRATE 50 UG/ML
INJECTION, SOLUTION INTRAMUSCULAR; INTRAVENOUS PRN
Status: DISCONTINUED | OUTPATIENT
Start: 2024-07-08 | End: 2024-07-08 | Stop reason: HOSPADM

## 2024-07-08 RX ORDER — CEFAZOLIN SODIUM 2 G/50ML
2 SOLUTION INTRAVENOUS
Status: COMPLETED | OUTPATIENT
Start: 2024-07-08 | End: 2024-07-08

## 2024-07-08 RX ORDER — LOPERAMIDE HCL 2 MG
2 CAPSULE ORAL ONCE
Status: COMPLETED | OUTPATIENT
Start: 2024-07-08 | End: 2024-07-08

## 2024-07-08 RX ORDER — HEPARIN SODIUM (PORCINE) LOCK FLUSH IV SOLN 100 UNIT/ML 100 UNIT/ML
3 SOLUTION INTRAVENOUS
Status: DISCONTINUED | OUTPATIENT
Start: 2024-07-08 | End: 2024-07-09 | Stop reason: HOSPADM

## 2024-07-08 RX ORDER — PLERIXAFOR 24 MG/1.2ML
0.24 SOLUTION SUBCUTANEOUS DAILY
Status: CANCELLED
Start: 2024-07-08

## 2024-07-08 RX ADMIN — PLERIXAFOR 24 MG: 24 SOLUTION SUBCUTANEOUS at 15:57

## 2024-07-08 RX ADMIN — LOPERAMIDE HYDROCHLORIDE 2 MG: 2 CAPSULE ORAL at 15:59

## 2024-07-08 RX ADMIN — FILGRASTIM-SNDZ 1080 MCG: 480 INJECTION, SOLUTION INTRAVENOUS; SUBCUTANEOUS at 12:30

## 2024-07-08 RX ADMIN — CEFAZOLIN SODIUM 2 G: 2 SOLUTION INTRAVENOUS at 10:02

## 2024-07-08 ASSESSMENT — PAIN SCALES - GENERAL: PAINLEVEL: NO PAIN (0)

## 2024-07-08 NOTE — PROGRESS NOTES
BMT/Cell Therapy Daily Progress Note   07/08/2024    Patient ID:  Inocente Romero is a 64 year old male, hx of MM, currently day +4 GCSF prior to stem cell collection    INTERVAL  HISTORY     Just had CVC placed.   Doing okay.  Using tylenol and claritin.   No acute medical complaints.     Review of Systems: 10 point ROS negative except as noted above.      PHYSICAL EXAM     Weight In/Out     Wt Readings from Last 3 Encounters:   07/08/24 104.3 kg (230 lb)   07/05/24 105.8 kg (233 lb 3.2 oz)   06/18/24 106.1 kg (233 lb 12.8 oz)      [unfilled]     +HTN  General: NAD   Eyes: sclera anicteric    Lungs: CTA bilaterally  Cardiovascular: RRR, no M/R/G   Lymphatics: no edema  Skin: no rashes or petechiae  Neuro: A&O   Additional Findings: Valderrama site no active bleeding noted    LABS AND IMAGING: I have assessed all abnormal lab values for their clinical significance and any values considered clinically significant have been addressed in the assessment and plan.        Lab Results   Component Value Date    WBC 6.6 06/18/2024    HGB 13.9 07/08/2024    HCT 40.8 07/08/2024     07/08/2024     06/18/2024    POTASSIUM 4.3 06/18/2024    CHLORIDE 104 06/18/2024    CO2 23 06/18/2024     (H) 06/18/2024    BUN 18.2 06/18/2024    CR 0.82 06/18/2024    CR 0.82 06/18/2024    MAG 2.0 06/18/2024    INR 1.05 07/08/2024       SYSTEMS-BASED ASSESSMENT AND PLAN     BMT/IEC PROTOCOL for MM Auto PBSCT  Transplants for multiple myeloma (delete if not needed):  The patient has Standard risk MM, and the collection goal is 8 million CD34/kg for 2 transplants.   - day 4 GCSF today.  - CVC placed  - CD34 11. Will give mozobil this afternoon and start collections tomorrow.    - scheduled for day 1 of collections tomorrow.        CV:   - Continue norvasc, ASA, lisinopril, metoprolol    FEN/Renal:  - noted on KCL 10meq BID. Will get chem panel rachel.    25 minutes spent on the date of the encounter doing chart review, review  of test results, interpretation of tests, patient visit, and documentation      Daily through stem cell collections.     Gabriela Magaña PA-C

## 2024-07-08 NOTE — DISCHARGE INSTRUCTIONS
A collaboration between [facility] and [facility]  Experts in minimally invasive, targeted treatments performed using imaging guidance    Venous Access Device,  Port Catheter or Tunneled or Non-Tunneled Central Line Placement    Today you had a procedure today to install a venous access device; either a tunneled central vein catheter or a subcutaneous port catheter.    After you go home:  Drink plenty of fluids.  Generally 6-8 (8 ounce) glasses a day is recommended.  Resume your regular diet unless otherwise ordered by a medical provider.  Keep any applied tape/gauze dressings clean and dry.  Change tape/gauze dressings if they get wet or soiled.  You may shower the following day after procedure, however cover and protect from moisture any tape/gauze dressings.  You may let water hit and run over dried skin glue, but do not scrub.  Pat the area dry after showering.  Port placement incisions are closed with absorbable suture, meaning they do not need to be removed at a later date, and a topical skin adhesive (skin glue).  This glue will wear off in 7-14 days.  Do not remove before this time.  If 14 days have passed and residual glue is present, you may gently remove it.  Do not apply gels, lotions, or ointments to the glue site for the first 10 days as this may cause the glue to prematurely soften and fail.  Do not perform strenuous activities or lift greater than 10 pounds for the next three days.  If there is bleeding or oozing from the procedure site, apply firm pressure to the area for 5-10 minutes.  If the bleeding continues seek medical advice at the numbers below.  Mild procedure site discomfort can be treated with an ice pack and over-the-counter pain relievers.        For 24 hours after any sedation used:  Relax and take it easy.  No strenuous activities.  Do not drive or operate machines at home or at work.  No alcohol consumption.  Do not make any  important or legal decisions.    Call our Interventional Radiology (IR) service if:  If you start bleeding from the procedure site.  If you do start to bleed from the site, lie down and hold some pressure on the site.  Our radiology provider can help you decide if you need to return to the hospital.  If you have new or worsening pain related to the procedure.  If you have concerning swelling at the procedure site.  If you develop persistent nausea or vomiting.  If you develop hives or a rash or any unexplained itching.  If you have a fever of greater than 100.5  F and chills in the first 5 days after procedure.  Any other concerns related to your procedure.      [facility]  Interventional Radiology (IR)  [address]  [address] [facility] [place] [address]    Contact Number:  [phone]  (IR Nurse Triage)  Monday - Friday 7am - 4pm    After hours for urgent concerns:  [phone]  After 4pm Monday - Friday, Weekends and Holidays.   Ask for Interventional Radiology on-call.  Someone is available 24 hours a day.  [facility] toll free number:  [phone]

## 2024-07-08 NOTE — NURSING NOTE
"Oncology Rooming Note    July 8, 2024 1:35 PM   Inocente Romero is a 64 year old male who presents for:    Chief Complaint   Patient presents with    Oncology Clinic Visit     Multiple Myeloma     Initial Vitals: BP (!) 155/88   Pulse 70   Temp (!) 96.7  F (35.9  C) (Oral)   Resp 16   Ht 1.803 m (5' 11\")   Wt 104.3 kg (230 lb)   SpO2 95%   BMI 32.08 kg/m   Estimated body mass index is 32.08 kg/m  as calculated from the following:    Height as of this encounter: 1.803 m (5' 11\").    Weight as of this encounter: 104.3 kg (230 lb). Body surface area is 2.29 meters squared.  No Pain (0) Comment: Data Unavailable   No LMP for male patient.  Allergies reviewed: Yes  Medications reviewed: Yes    Medications: Medication refills not needed today.  Pharmacy name entered into [employer]: [facility] - [address]    Frailty Screening:   Is the patient here for a new oncology consult visit in cancer care? 2. No      Clinical concerns:        Sushila Mancera CMA                      "

## 2024-07-08 NOTE — LETTER
7/8/2024      Inocente Romero  [address]  [place] [address]      Dear Colleague,    Thank you for referring your patient, Inocente Romero, to the [facility] BLOOD AND MARROW TRANSPLANT PROGRAM [place]. Please see a copy of my visit note below.    BMT/Cell Therapy Daily Progress Note   07/08/2024    Patient ID:  Inocente Romero is a 64 year old male, hx of MM, currently day +4 GCSF prior to stem cell collection    INTERVAL  HISTORY     Just had CVC placed.   Doing okay.  Using tylenol and claritin.   No acute medical complaints.     Review of Systems: 10 point ROS negative except as noted above.      PHYSICAL EXAM     Weight In/Out     Wt Readings from Last 3 Encounters:   07/08/24 104.3 kg (230 lb)   07/05/24 105.8 kg (233 lb 3.2 oz)   06/18/24 106.1 kg (233 lb 12.8 oz)      [unfilled]     +HTN  General: NAD   Eyes: sclera anicteric    Lungs: CTA bilaterally  Cardiovascular: RRR, no M/R/G   Lymphatics: no edema  Skin: no rashes or petechiae  Neuro: A&O   Additional Findings: Valderrama site no active bleeding noted    LABS AND IMAGING: I have assessed all abnormal lab values for their clinical significance and any values considered clinically significant have been addressed in the assessment and plan.        Lab Results   Component Value Date    WBC 6.6 06/18/2024    HGB 13.9 07/08/2024    HCT 40.8 07/08/2024     07/08/2024     06/18/2024    POTASSIUM 4.3 06/18/2024    CHLORIDE 104 06/18/2024    CO2 23 06/18/2024     (H) 06/18/2024    BUN 18.2 06/18/2024    CR 0.82 06/18/2024    CR 0.82 06/18/2024    MAG 2.0 06/18/2024    INR 1.05 07/08/2024       SYSTEMS-BASED ASSESSMENT AND PLAN     BMT/IEC PROTOCOL for MM Auto PBSCT  Transplants for multiple myeloma (delete if not needed):  The patient has Standard risk MM, and the collection goal is 8 million CD34/kg for 2 transplants.   - day 4 GCSF today.  - CVC placed  - awaiting CD34 to decide if mozobil needed or not.   -  scheduled for day 1 of collections tomorrow.       CV:   - Continue norvasc, ASA, lisinopril, metoprolol    FEN/Renal:  - noted on KCL 10meq BID. Will get chem panel rachel.    25 minutes spent on the date of the encounter doing chart review, review of test results, interpretation of tests, patient visit, and documentation      Daily through stem cell collections.     Gabriela Magaña PA-C

## 2024-07-08 NOTE — PROGRESS NOTES
First dose Mozobil Nursing Note:  Inocente Romero presents today for Day 1 Mozobil.    Patient seen by provider today: Yes   present during visit today: Not Applicable.    Pregnancy Status: Pregnancy Status: Not Applicable - male patient    Note: Pt here today for first dose Mozobil prior to collections. Meds and allergies reviewed. VSS. Pt reports feeling well today and denies pain, fever/chills, bleeding, n/v/d, or respiratory symptoms. See assessment flowsheet for details. Pt was educated on medication side effects/symptoms. All questions answered and pt verbalized understanding. Injection was administered in right lower abdomen. Pt was monitored for 15 minutes post injection.     Pt was given Immodium for possible diarrhea from injection.     Post Injection Assessment:  Patient tolerated injection without incident.  Patient observed for 15 minutes post per protocol.     Discharge Plan:   Patient and/or family verbalized understanding of discharge instructions and all questions answered.  Patient discharged in stable condition accompanied by: self.      Luz Legre RN

## 2024-07-08 NOTE — BRIEF OP NOTE
[facility]    Brief Operative Note    Pre-operative diagnosis: Multiple myeloma, remission status unspecified (H) [C90.00]  Post-operative diagnosis Same as pre-operative diagnosis    Procedure: central venous catheter placement, tunneled Insert vascular access, Right - Neck    Surgeon: Surgeons and Role:     * Arnold Butler MD - Primary  Anesthesia: Moderate Sedation   Estimated Blood Loss: Minimal    Drains: None  Specimens: * No specimens in log *  Findings:   None.  Complications: None.  Implants: * No implants in log *        23 cm tip to cuff palindrome tunneled central venous catheter placed via right internal jugular vein. Tip in the mid right atrium. Heparin locked and ready for use.    1 mg Versed and 50 mcg Fentanyl IV.     Attending face-to-face sedation time: 33 minutes.

## 2024-07-09 ENCOUNTER — HOSPITAL ENCOUNTER (OUTPATIENT)
Dept: LAB | Facility: CLINIC | Age: 64
Discharge: HOME | End: 2024-07-09
Attending: INTERNAL MEDICINE
Payer: COMMERCIAL

## 2024-07-09 ENCOUNTER — INFUSION THERAPY VISIT (OUTPATIENT)
Dept: TRANSPLANT | Facility: CLINIC | Age: 64
End: 2024-07-09
Attending: INTERNAL MEDICINE
Payer: COMMERCIAL

## 2024-07-09 ENCOUNTER — ONCOLOGY VISIT (OUTPATIENT)
Dept: TRANSPLANT | Facility: CLINIC | Age: 64
End: 2024-07-09
Payer: COMMERCIAL

## 2024-07-09 DIAGNOSIS — C90.00 MULTIPLE MYELOMA, REMISSION STATUS UNSPECIFIED (H): Primary | ICD-10-CM

## 2024-07-09 LAB
ANION GAP SERPL CALCULATED.3IONS-SCNC: 10 MMOL/L (ref 7–15)
BASOPHILS # BLD AUTO: ABNORMAL 10*3/UL
BASOPHILS # BLD MANUAL: 0 10E3/UL (ref 0–0.2)
BASOPHILS NFR BLD AUTO: ABNORMAL %
BASOPHILS NFR BLD MANUAL: 0 %
BILL ONLY AUTO PBPC FREEZE: NORMAL
BUN SERPL-MCNC: 13.1 MG/DL (ref 8–23)
CALCIUM SERPL-MCNC: 9.6 MG/DL (ref 8.8–10.2)
CD34 ABSOLUTE COUNT COMMENT: NORMAL
CD34 CELLS # SPEC: 56 CELLS/UL
CD34 CELLS NFR SPEC: 0.08 %
CHLORIDE SERPL-SCNC: 104 MMOL/L (ref 98–107)
CREAT SERPL-MCNC: 0.95 MG/DL (ref 0.67–1.17)
DEPRECATED HCO3 PLAS-SCNC: 26 MMOL/L (ref 22–29)
EGFRCR SERPLBLD CKD-EPI 2021: 90 ML/MIN/1.73M2
EOSINOPHIL # BLD AUTO: ABNORMAL 10*3/UL
EOSINOPHIL # BLD MANUAL: 1.2 10E3/UL (ref 0–0.7)
EOSINOPHIL NFR BLD AUTO: ABNORMAL %
EOSINOPHIL NFR BLD MANUAL: 2 %
ERYTHROCYTE [DISTWIDTH] IN BLOOD BY AUTOMATED COUNT: 13.6 % (ref 10–15)
GLUCOSE SERPL-MCNC: 108 MG/DL (ref 70–99)
HCT VFR BLD AUTO: 38.8 % (ref 40–53)
HGB BLD-MCNC: 12.9 G/DL (ref 13.3–17.7)
IMM GRANULOCYTES # BLD: ABNORMAL 10*3/UL
IMM GRANULOCYTES NFR BLD: ABNORMAL %
LYMPHOCYTES # BLD AUTO: ABNORMAL 10*3/UL
LYMPHOCYTES # BLD MANUAL: 3.5 10E3/UL (ref 0.8–5.3)
LYMPHOCYTES NFR BLD AUTO: ABNORMAL %
LYMPHOCYTES NFR BLD MANUAL: 6 %
MAGNESIUM SERPL-MCNC: 1.9 MG/DL (ref 1.7–2.3)
MCH RBC QN AUTO: 31.6 PG (ref 26.5–33)
MCHC RBC AUTO-ENTMCNC: 33.2 G/DL (ref 31.5–36.5)
MCV RBC AUTO: 95 FL (ref 78–100)
METAMYELOCYTES # BLD MANUAL: 1.7 10E3/UL
METAMYELOCYTES NFR BLD MANUAL: 3 %
MONOCYTES # BLD AUTO: ABNORMAL 10*3/UL
MONOCYTES # BLD MANUAL: 3.5 10E3/UL (ref 0–1.3)
MONOCYTES NFR BLD AUTO: ABNORMAL %
MONOCYTES NFR BLD MANUAL: 6 %
NEUTROPHILS # BLD AUTO: ABNORMAL 10*3/UL
NEUTROPHILS # BLD MANUAL: 47.6 10E3/UL (ref 1.6–8.3)
NEUTROPHILS NFR BLD AUTO: ABNORMAL %
NEUTROPHILS NFR BLD MANUAL: 82 %
NRBC # BLD AUTO: 0 10E3/UL
NRBC BLD AUTO-RTO: 0 /100
PLAT MORPH BLD: ABNORMAL
PLATELET # BLD AUTO: 161 10E3/UL (ref 150–450)
POTASSIUM SERPL-SCNC: 3.9 MMOL/L (ref 3.4–5.3)
PRODUCT NUMBER FLOW CYTOMETRY: NORMAL
PROMYELOCYTES # BLD MANUAL: 0.6 10E3/UL
PROMYELOCYTES NFR BLD MANUAL: 1 %
RBC # BLD AUTO: 4.08 10E6/UL (ref 4.4–5.9)
RBC MORPH BLD: ABNORMAL
SODIUM SERPL-SCNC: 140 MMOL/L (ref 135–145)
VIABLE CD34 CELLS NFR FLD: 98.53 %
WBC # BLD AUTO: 58 10E3/UL (ref 4–11)

## 2024-07-09 PROCEDURE — 96374 THER/PROPH/DIAG INJ IV PUSH: CPT

## 2024-07-09 PROCEDURE — 85007 BL SMEAR W/DIFF WBC COUNT: CPT

## 2024-07-09 PROCEDURE — 250N000011 HC RX IP 250 OP 636: Performed by: PATHOLOGY

## 2024-07-09 PROCEDURE — 85027 COMPLETE CBC AUTOMATED: CPT

## 2024-07-09 PROCEDURE — 99213 OFFICE O/P EST LOW 20 MIN: CPT

## 2024-07-09 PROCEDURE — 86367 STEM CELLS TOTAL COUNT: CPT

## 2024-07-09 PROCEDURE — 83735 ASSAY OF MAGNESIUM: CPT

## 2024-07-09 PROCEDURE — 96372 THER/PROPH/DIAG INJ SC/IM: CPT | Performed by: PHYSICIAN ASSISTANT

## 2024-07-09 PROCEDURE — 38206 HARVEST AUTO STEM CELLS: CPT

## 2024-07-09 PROCEDURE — 36592 COLLECT BLOOD FROM PICC: CPT

## 2024-07-09 PROCEDURE — 250N000011 HC RX IP 250 OP 636: Performed by: INTERNAL MEDICINE

## 2024-07-09 PROCEDURE — 80048 BASIC METABOLIC PNL TOTAL CA: CPT

## 2024-07-09 PROCEDURE — 250N000011 HC RX IP 250 OP 636: Performed by: PHYSICIAN ASSISTANT

## 2024-07-09 PROCEDURE — 38207 CRYOPRESERVE STEM CELLS: CPT

## 2024-07-09 PROCEDURE — 250N000009 HC RX 250: Performed by: PATHOLOGY

## 2024-07-09 PROCEDURE — 96372 THER/PROPH/DIAG INJ SC/IM: CPT | Mod: XS | Performed by: INTERNAL MEDICINE

## 2024-07-09 RX ORDER — HEPARIN SODIUM,PORCINE 10 UNIT/ML
5-20 VIAL (ML) INTRAVENOUS DAILY PRN
Status: CANCELLED | OUTPATIENT
Start: 2024-07-10

## 2024-07-09 RX ORDER — HEPARIN SODIUM (PORCINE) LOCK FLUSH IV SOLN 100 UNIT/ML 100 UNIT/ML
5 SOLUTION INTRAVENOUS
Status: CANCELLED | OUTPATIENT
Start: 2024-07-10

## 2024-07-09 RX ORDER — PLERIXAFOR 24 MG/1.2ML
0.24 SOLUTION SUBCUTANEOUS DAILY
Status: CANCELLED
Start: 2024-07-10

## 2024-07-09 RX ORDER — LOPERAMIDE HCL 2 MG
2 CAPSULE ORAL ONCE
Status: DISCONTINUED | OUTPATIENT
Start: 2024-07-09 | End: 2024-07-09 | Stop reason: HOSPADM

## 2024-07-09 RX ORDER — HEPARIN SODIUM (PORCINE) LOCK FLUSH IV SOLN 100 UNIT/ML 100 UNIT/ML
3 SOLUTION INTRAVENOUS ONCE
Status: COMPLETED | OUTPATIENT
Start: 2024-07-09 | End: 2024-07-09

## 2024-07-09 RX ORDER — PLERIXAFOR 24 MG/1.2ML
24 SOLUTION SUBCUTANEOUS DAILY
Status: DISCONTINUED | OUTPATIENT
Start: 2024-07-09 | End: 2024-07-09 | Stop reason: HOSPADM

## 2024-07-09 RX ADMIN — CALCIUM GLUCONATE 2086 MG/HR: 98 INJECTION, SOLUTION INTRAVENOUS at 08:39

## 2024-07-09 RX ADMIN — Medication 3 ML: at 14:16

## 2024-07-09 RX ADMIN — PLERIXAFOR 24 MG: 24 SOLUTION SUBCUTANEOUS at 15:11

## 2024-07-09 RX ADMIN — FILGRASTIM-SNDZ 1080 MCG: 480 INJECTION, SOLUTION INTRAVENOUS; SUBCUTANEOUS at 07:59

## 2024-07-09 RX ADMIN — ANTICOAGULANT CITRATE DEXTROSE SOLUTION FORMULA A 2121 ML: 12.25; 11; 3.65 SOLUTION INTRAVENOUS at 08:39

## 2024-07-09 NOTE — PROGRESS NOTES
BMT/Cell Therapy Daily Progress Note   07/09/2024    Patient ID:  Inocente Romero is a 64 year old male, hx of MM, currently undergoing stem cell collection. Today is the first day of collections.     INTERVAL  HISTORY     Felix returns today for collections. He feels well overall, tolerating collections without difficulty. Did have diarrhea with his mozobil yesterday. No other medical complaints.     Review of Systems: 10 point ROS negative except as noted above.      PHYSICAL EXAM     Weight In/Out     Wt Readings from Last 3 Encounters:   07/08/24 104.3 kg (230 lb)   07/08/24 104.3 kg (230 lb)   07/05/24 105.8 kg (233 lb 3.2 oz)      [unfilled]     Vitals reviewed in flowsheets- vitals stable.   General: NAD   Eyes: sclera anicteric    Lungs: breathing comfortably on room air  Cardiovascular: well perfused   Lymphatics: no edema  Skin: no rashes or petechiae  Neuro: A&O   Additional Findings: Valderrama site no active bleeding noted    LABS AND IMAGING: I have assessed all abnormal lab values for their clinical significance and any values considered clinically significant have been addressed in the assessment and plan.        Lab Results   Component Value Date    WBC 37.8 (H) 07/08/2024    ANEU 31.0 (H) 07/08/2024    HGB 13.9 07/08/2024    HCT 40.8 07/08/2024     07/08/2024     06/18/2024    POTASSIUM 4.3 06/18/2024    CHLORIDE 104 06/18/2024    CO2 23 06/18/2024     (H) 06/18/2024    BUN 18.2 06/18/2024    CR 0.82 06/18/2024    CR 0.82 06/18/2024    MAG 2.0 06/18/2024    INR 1.05 07/08/2024       SYSTEMS-BASED ASSESSMENT AND PLAN     BMT/IEC PROTOCOL for MM Auto PBSCT  Transplants for multiple myeloma:  The patient has Standard risk MM, and the collection goal is 8 million CD34/kg for 2 transplants.     - 7/8: CD34 11. Mozobil dose 1    - 7/9: CD34 56. Collections day 1, mozobil dose 2.    CV:   - Continue norvasc, ASA, lisinopril, metoprolol    FEN/Renal:  - noted on KCL 10meq BID. Ok  to continue with k 3.9.    Summary:  Collections day 1  Mozobil dose #2    25 minutes spent on the date of the encounter doing chart review, review of test results, interpretation of tests, patient visit, and documentation      Daily through stem cell collections.     Nguyen Sharp NP

## 2024-07-09 NOTE — NURSING NOTE
Patient arrived to clinic for Zarxio 1080 mcg injection today.  Injection given to abdominal tissue without incident and patient tolerated procedure well.  Patient is aware of appointment tomorrow.    Sushila Mancera CMA ([facility])

## 2024-07-09 NOTE — LETTER
7/9/2024      Inocente Romero  [address]  [place] [address]      Dear Colleague,    Thank you for referring your patient, Inocente Romero, to the [facility] BLOOD AND MARROW TRANSPLANT PROGRAM [place]. Please see a copy of my visit note below.    BMT/Cell Therapy Daily Progress Note   07/09/2024    Patient ID:  Inocente Romero is a 64 year old male, hx of MM, currently undergoing stem cell collection. Today is the first day of collections.     INTERVAL  HISTORY     Felix returns today for collections. He feels well overall, tolerating collections without difficulty. Did have diarrhea with his mozobil yesterday. No other medical complaints.     Review of Systems: 10 point ROS negative except as noted above.      PHYSICAL EXAM     Weight In/Out     Wt Readings from Last 3 Encounters:   07/08/24 104.3 kg (230 lb)   07/08/24 104.3 kg (230 lb)   07/05/24 105.8 kg (233 lb 3.2 oz)      [unfilled]     Vitals reviewed in flowsheets- vitals stable.   General: NAD   Eyes: sclera anicteric    Lungs: breathing comfortably on room air  Cardiovascular: well perfused   Lymphatics: no edema  Skin: no rashes or petechiae  Neuro: A&O   Additional Findings: Valderrama site no active bleeding noted    LABS AND IMAGING: I have assessed all abnormal lab values for their clinical significance and any values considered clinically significant have been addressed in the assessment and plan.        Lab Results   Component Value Date    WBC 37.8 (H) 07/08/2024    ANEU 31.0 (H) 07/08/2024    HGB 13.9 07/08/2024    HCT 40.8 07/08/2024     07/08/2024     06/18/2024    POTASSIUM 4.3 06/18/2024    CHLORIDE 104 06/18/2024    CO2 23 06/18/2024     (H) 06/18/2024    BUN 18.2 06/18/2024    CR 0.82 06/18/2024    CR 0.82 06/18/2024    MAG 2.0 06/18/2024    INR 1.05 07/08/2024       SYSTEMS-BASED ASSESSMENT AND PLAN     BMT/IEC PROTOCOL for MM Auto PBSCT  Transplants for multiple myeloma:  The patient has  Standard risk MM, and the collection goal is 8 million CD34/kg for 2 transplants.     - 7/8: CD34 11. Mozobil dose 1    - 7/9: CD34 56. Collections day 1, mozobil dose 2.    CV:   - Continue norvasc, ASA, lisinopril, metoprolol    FEN/Renal:  - noted on KCL 10meq BID. Ok to continue with k 3.9.    Summary:  Collections day 1  Mozobil dose #2    25 minutes spent on the date of the encounter doing chart review, review of test results, interpretation of tests, patient visit, and documentation      Daily through stem cell collections.     Nguyen Sharp NP

## 2024-07-09 NOTE — PROGRESS NOTES
Nursing Note:  Inocente Romero presents today for add on infusion.    Patient seen by provider today: Yes: Nguyen Sharp, BLAYNE-P   present during visit today: Not Applicable.    Note: Pt here for day 2 Mozobil injection. Assessment completed by BRANDON prior to arrival. All questions answered and pt verbalized understanding. Mozobil 1080 mcg given subcutaneous to right lower abdomen.       Discharge Plan:   Patient discharged in stable condition accompanied by: wife.  Departure Mode: Ambulatory.      Nereyda Phillips RN

## 2024-07-09 NOTE — DISCHARGE INSTRUCTIONS
Apheresis Blood Donor Center Post Instructions  You may feel tired after your procedure today.   Please call your doctor if you have:  bleeding that doesn t stop, fever, pain where a needle or tube (catheter) was placed, seizures, trouble breathing, red urine, nausea or vomiting, other health concerns.     If your symptoms are severe, call 151.    If you have a Central Venous Catheter:  Notify your doctor if you have had a fever, chills, shaking  or redness, warmth, swelling, drainage at the exit-site.  This could be a sign of infection.      The Apheresis/Blood Donor Center is open Monday-Friday 7:30 a.m. to 5 p.m.  The phone number is [phone].  A Transfusion Medicine physician can be reached after 5:00 p.m. weekdays and on weekends /Holidays by calling [phone], and asking for the physician on call.      Autologous HPC/MNC Collection:   In most cases, the cell dose report will be available tomorrow morning.   The Bone Marrow Transplant (BMT) clinic staff looks at your report, and a decision is made if you will need another collection.   Remember it is important to follow a low fat diet during the collection process. Sometimes following the procedure, your blood platelet count may be low.  If you are told your platelet count is low, you need to avoid taking aspirin/aspirin containing products and avoid heavy physical activity and activities that may result in bruising or traumatic injury.  To contact the BMT fellow or attending physician after 5 p.m. call [phone].

## 2024-07-09 NOTE — PROCEDURES
Transfusion Medicine Procedure    Inocente Romero MRN# [id number]   YOB: 1960 Age: 64 year old   Date of Admission: 7/9/2024     Reason for consult: Autologous Human Progenitor Cell (HPC) Collection            Assessment and Plan:   64 year old male presents for autologous HPC collection for autologous PBSCT for multiple myeloma.  The plan is to collect for 1 to 3 days or until the target goal is met.   A central line has been placed and is being used for access for the procedure.     The patient is currently tolerating day 1 of the collection with no complaints.  Continue with plan per BMT.                History of Present Illness:   64 year old male presents for autologous  HPC  collection.  His past medical history includes multiple myeloma, hospitalization for significant pulmonary infection last year, and hypertension.  He is currently feeling well.  The patient denies any back pain that would prevent him from tolerating the procedure. The patient served in the [employer]  and was stationed in [place] for 2-3 years, representing a potential infectious disease risk that is required to be reflected on the collection bag, but is not concerning with regard to patient safety.  The patient has no other identifiable risk factors for infectious disease.  The procedure, risks/benefits were discussed with the patient and his wife, all of their questions were answered, and consent was obtained..             Past Medical History:   No past medical history on file.          Past Surgical History:     Past Surgical History:   Procedure Laterality Date    INSERT CATHETER VASCULAR ACCESS Right 7/8/2024    Procedure: central venous catheter placement, tunneled Insert vascular access;  Surgeon: Arnold Butler MD;  Location: [facility] [place]    IR CVC TUNNEL PLACEMENT > 5 YRS OF AGE  7/8/2024              Social History:     Social History     Tobacco Use    Smoking status: Never    Smokeless tobacco: Never    Substance Use Topics    Alcohol use: Not on file             Immunizations:     Immunization History   Administered Date(s) Administered    COVID-19 MONOVALENT 12+ (Pfizer) 04/14/2021, 05/10/2021             Allergies:   No Known Allergies          Medications:     Current Outpatient Medications   Medication Sig Dispense Refill    acyclovir (ZOVIRAX) 800 MG tablet Take 800 mg by mouth 2 times daily      allopurinol (ZYLOPRIM) 300 MG tablet Take 300 mg by mouth daily      amLODIPine (NORVASC) 10 MG tablet Take 1 tablet (10 mg) by mouth daily 30 tablet 11    Cyanocobalamin 1000 MCG/ML KIT 1,000 mcg every 30 days      lisinopril (ZESTRIL) 10 MG tablet Take 10 mg by mouth every evening      lisinopril (ZESTRIL) 20 MG tablet Take 20 mg by mouth every morning      magnesium 200 MG TABS Take 200 mg by mouth daily      metoprolol succinate ER (TOPROL XL) 100 MG 24 hr tablet Take 100 mg by mouth every evening      potassium chloride ER (K-TAB/KLOR-CON) 10 MEQ CR tablet Take 10 mEq by mouth 2 times daily      aspirin 81 MG EC tablet Take 81 mg by mouth daily (Patient not taking: Reported on 7/8/2024)      heparin lock flush 10 unit/mL Inject 5 mLs as needed for line flush (Inject 5mLs into each lumen daily) (Patient not taking: Reported on 7/8/2024) 300 mL 1     Current Facility-Administered Medications   Medication Dose Route Frequency Provider Last Rate Last Admin    filgrastim-sndz (ZARXIO) 1,080 mcg injection  1,080 mcg Subcutaneous Daily Gianfranco Carlin MD   1,080 mcg at 07/09/24 0759     Facility-Administered Medications Ordered in Other Encounters   Medication Dose Route Frequency Provider Last Rate Last Admin    loperamide (IMODIUM) capsule 2 mg  2 mg Oral Once Nguyen Sharp NP                   Vital Signs:   Recent vital signs reviewed            Data:      ROUTINE IP LABS (Last four results)  BMP  Recent Labs   Lab 07/09/24  0823      POTASSIUM 3.9   CHLORIDE 104   REY 9.6   CO2 26   BUN 13.1   CR  0.95   *     CBC  Recent Labs   Lab 07/09/24  0810 07/08/24  1111   WBC 58.0* 37.8*   RBC 4.08* 4.31*   HGB 12.9* 13.9   HCT 38.8* 40.8   MCV 95 95   MCH 31.6 32.3   MCHC 33.2 34.1   RDW 13.6 13.6    173     INR  Recent Labs   Lab 07/08/24  0859   INR 1.05     PROCEDURE NOTE:         Procedure Summary:   Human progenitor cell collection was performed.  A dual lumen central line was used for access and allowed for appropriate flow during the procedure.  ACD-A was used for anticoagulation. Calcium gluconate was given in the return line to offset the effects of the citrate.   The patient's vital signs were stable throughout.  The patient tolerated the procedure well without complication.  See apheresis flowsheet for additional details.     Attestation: During the procedure the patient was directly seen and evaluated by me, Gladys Buck MD.  I have reviewed the chart and pertinent laboratory findings, and discussed the patient and the current procedure with the Apheresis nursing staff.    Gladys Buck MD  Transfusion Medicine Attending  Laboratory Medicine & Pathology

## 2024-07-10 ENCOUNTER — HOSPITAL ENCOUNTER (OUTPATIENT)
Dept: LAB | Facility: CLINIC | Age: 64
Discharge: HOME | End: 2024-07-10
Attending: INTERNAL MEDICINE | Admitting: INTERNAL MEDICINE
Payer: COMMERCIAL

## 2024-07-10 VITALS
SYSTOLIC BLOOD PRESSURE: 160 MMHG | RESPIRATION RATE: 16 BRPM | HEART RATE: 82 BPM | TEMPERATURE: 98.4 F | DIASTOLIC BLOOD PRESSURE: 72 MMHG

## 2024-07-10 DIAGNOSIS — C90.00 MULTIPLE MYELOMA, REMISSION STATUS UNSPECIFIED (H): Primary | ICD-10-CM

## 2024-07-10 LAB
BASOPHILS # BLD AUTO: ABNORMAL 10*3/UL
BASOPHILS # BLD MANUAL: 0 10E3/UL (ref 0–0.2)
BASOPHILS NFR BLD AUTO: ABNORMAL %
BASOPHILS NFR BLD MANUAL: 0 %
CA-I BLD-MCNC: 4.9 MG/DL (ref 4.4–5.2)
CD34 ABSOLUTE COUNT COMMENT: NORMAL
CD34 CELLS # SPEC: 49 CELLS/UL
CD34 CELLS NFR SPEC: 0.07 %
EOSINOPHIL # BLD AUTO: ABNORMAL 10*3/UL
EOSINOPHIL # BLD MANUAL: 0 10E3/UL (ref 0–0.7)
EOSINOPHIL NFR BLD AUTO: ABNORMAL %
EOSINOPHIL NFR BLD MANUAL: 0 %
ERYTHROCYTE [DISTWIDTH] IN BLOOD BY AUTOMATED COUNT: 13.5 % (ref 10–15)
HCT VFR BLD AUTO: 37.9 % (ref 40–53)
HGB BLD-MCNC: 12.7 G/DL (ref 13.3–17.7)
IMM GRANULOCYTES # BLD: ABNORMAL 10*3/UL
IMM GRANULOCYTES NFR BLD: ABNORMAL %
LYMPHOCYTES # BLD AUTO: ABNORMAL 10*3/UL
LYMPHOCYTES # BLD MANUAL: 3.7 10E3/UL (ref 0.8–5.3)
LYMPHOCYTES NFR BLD AUTO: ABNORMAL %
LYMPHOCYTES NFR BLD MANUAL: 6 %
MAGNESIUM SERPL-MCNC: 1.6 MG/DL (ref 1.7–2.3)
MCH RBC QN AUTO: 31.8 PG (ref 26.5–33)
MCHC RBC AUTO-ENTMCNC: 33.5 G/DL (ref 31.5–36.5)
MCV RBC AUTO: 95 FL (ref 78–100)
METAMYELOCYTES # BLD MANUAL: 1.8 10E3/UL
METAMYELOCYTES NFR BLD MANUAL: 3 %
MONOCYTES # BLD AUTO: ABNORMAL 10*3/UL
MONOCYTES # BLD MANUAL: 4.3 10E3/UL (ref 0–1.3)
MONOCYTES NFR BLD AUTO: ABNORMAL %
MONOCYTES NFR BLD MANUAL: 7 %
NEUTROPHILS # BLD AUTO: ABNORMAL 10*3/UL
NEUTROPHILS # BLD MANUAL: 51 10E3/UL (ref 1.6–8.3)
NEUTROPHILS NFR BLD AUTO: ABNORMAL %
NEUTROPHILS NFR BLD MANUAL: 83 %
NRBC # BLD AUTO: 0 10E3/UL
NRBC BLD AUTO-RTO: 0 /100
PLAT MORPH BLD: ABNORMAL
PLATELET # BLD AUTO: 106 10E3/UL (ref 150–450)
PRODUCT NUMBER FLOW CYTOMETRY: NORMAL
PROMYELOCYTES # BLD MANUAL: 0.6 10E3/UL
PROMYELOCYTES NFR BLD MANUAL: 1 %
RBC # BLD AUTO: 3.99 10E6/UL (ref 4.4–5.9)
RBC MORPH BLD: ABNORMAL
VIABLE CD34 CELLS NFR FLD: 98.61 %
WBC # BLD AUTO: 61.5 10E3/UL (ref 4–11)

## 2024-07-10 PROCEDURE — 250N000009 HC RX 250: Performed by: PATHOLOGY

## 2024-07-10 PROCEDURE — 86367 STEM CELLS TOTAL COUNT: CPT | Performed by: PHYSICIAN ASSISTANT

## 2024-07-10 PROCEDURE — 82330 ASSAY OF CALCIUM: CPT | Performed by: PATHOLOGY

## 2024-07-10 PROCEDURE — 96365 THER/PROPH/DIAG IV INF INIT: CPT | Mod: 59

## 2024-07-10 PROCEDURE — 96375 TX/PRO/DX INJ NEW DRUG ADDON: CPT | Mod: XS

## 2024-07-10 PROCEDURE — 250N000011 HC RX IP 250 OP 636

## 2024-07-10 PROCEDURE — 36592 COLLECT BLOOD FROM PICC: CPT | Performed by: PATHOLOGY

## 2024-07-10 PROCEDURE — 96372 THER/PROPH/DIAG INJ SC/IM: CPT | Performed by: INTERNAL MEDICINE

## 2024-07-10 PROCEDURE — 250N000011 HC RX IP 250 OP 636: Performed by: PATHOLOGY

## 2024-07-10 PROCEDURE — 250N000011 HC RX IP 250 OP 636: Performed by: INTERNAL MEDICINE

## 2024-07-10 PROCEDURE — 85007 BL SMEAR W/DIFF WBC COUNT: CPT

## 2024-07-10 PROCEDURE — 38206 HARVEST AUTO STEM CELLS: CPT

## 2024-07-10 PROCEDURE — 83735 ASSAY OF MAGNESIUM: CPT | Performed by: PATHOLOGY

## 2024-07-10 PROCEDURE — 85027 COMPLETE CBC AUTOMATED: CPT

## 2024-07-10 RX ORDER — HEPARIN SODIUM,PORCINE 10 UNIT/ML
5-20 VIAL (ML) INTRAVENOUS DAILY PRN
Status: CANCELLED | OUTPATIENT
Start: 2024-07-11

## 2024-07-10 RX ORDER — HEPARIN SODIUM (PORCINE) LOCK FLUSH IV SOLN 100 UNIT/ML 100 UNIT/ML
5 SOLUTION INTRAVENOUS
Status: CANCELLED | OUTPATIENT
Start: 2024-07-11

## 2024-07-10 RX ORDER — HEPARIN SODIUM (PORCINE) LOCK FLUSH IV SOLN 100 UNIT/ML 100 UNIT/ML
3 SOLUTION INTRAVENOUS ONCE
Status: COMPLETED | OUTPATIENT
Start: 2024-07-10 | End: 2024-07-10

## 2024-07-10 RX ORDER — PLERIXAFOR 24 MG/1.2ML
0.24 SOLUTION SUBCUTANEOUS DAILY
Status: CANCELLED
Start: 2024-07-11

## 2024-07-10 RX ORDER — MAGNESIUM SULFATE HEPTAHYDRATE 40 MG/ML
2 INJECTION, SOLUTION INTRAVENOUS ONCE
Status: COMPLETED | OUTPATIENT
Start: 2024-07-10 | End: 2024-07-10

## 2024-07-10 RX ADMIN — CALCIUM GLUCONATE 2112 MG/HR: 98 INJECTION, SOLUTION INTRAVENOUS at 09:04

## 2024-07-10 RX ADMIN — ANTICOAGULANT CITRATE DEXTROSE SOLUTION FORMULA A 1413 ML: 12.25; 11; 3.65 SOLUTION INTRAVENOUS at 09:04

## 2024-07-10 RX ADMIN — Medication 3 ML: at 13:29

## 2024-07-10 RX ADMIN — MAGNESIUM SULFATE 2 G: 2 INJECTION INTRAVENOUS at 12:13

## 2024-07-10 RX ADMIN — FILGRASTIM-SNDZ 1080 MCG: 480 INJECTION, SOLUTION INTRAVENOUS; SUBCUTANEOUS at 09:08

## 2024-07-10 RX ADMIN — Medication 3 ML: at 13:30

## 2024-07-10 NOTE — PROCEDURES
Transfusion Medicine Procedure    Inocente Romero MRN# [id number]   YOB: 1960 Age: 64 year old   Date of Admission: 7/9/2024     Reason for consult: Autologous Human Progenitor Cell (HPC) Collection            Assessment and Plan:   64 year old male presents for autologous HPC collection for autologous PBSCT for multiple myeloma.  The plan is to collect for 1 to 3 days or until the target goal is met.   A central line has been placed and is being used for access for the procedure.     The patient is currently tolerating day 2 of the collection with no complaints.  Continue with plan per BMT.                History of Present Illness:   64 year old male presents for autologous  HPC  collection.  His past medical history includes multiple myeloma, hospitalization for significant pulmonary infection last year, and hypertension.  He is currently feeling well.  The patient denies any back pain that would prevent him from tolerating the procedure. The patient served in the [employer]  and was stationed in [place] for 2-3 years, representing a potential infectious disease risk that is required to be reflected on the collection bag, but is not concerning with regard to patient safety.  The patient has no other identifiable risk factors for infectious disease.  The procedure, risks/benefits were discussed with the patient and his wife, all of their questions were answered, and consent was obtained..             Past Medical History:   No past medical history on file.          Past Surgical History:     Past Surgical History:   Procedure Laterality Date    INSERT CATHETER VASCULAR ACCESS Right 7/8/2024    Procedure: central venous catheter placement, tunneled Insert vascular access;  Surgeon: Arnold Butler MD;  [place] [facility] [place]    IR CVC TUNNEL PLACEMENT > 5 YRS OF AGE  7/8/2024              Social History:     Social History     Tobacco Use    Smoking status: Never    Smokeless tobacco: Never    Substance Use Topics    Alcohol use: Not on file             Immunizations:     Immunization History   Administered Date(s) Administered    COVID-19 MONOVALENT 12+ (Pfizer) 04/14/2021, 05/10/2021             Allergies:   No Known Allergies          Medications:     Current Outpatient Medications   Medication Sig Dispense Refill    acyclovir (ZOVIRAX) 800 MG tablet Take 800 mg by mouth 2 times daily      allopurinol (ZYLOPRIM) 300 MG tablet Take 300 mg by mouth daily      amLODIPine (NORVASC) 10 MG tablet Take 1 tablet (10 mg) by mouth daily 30 tablet 11    aspirin 81 MG EC tablet Take 81 mg by mouth daily      Cyanocobalamin 1000 MCG/ML KIT 1,000 mcg every 30 days      heparin lock flush 10 unit/mL Inject 5 mLs as needed for line flush (Inject 5mLs into each lumen daily) 300 mL 1    lisinopril (ZESTRIL) 10 MG tablet Take 10 mg by mouth every evening      lisinopril (ZESTRIL) 20 MG tablet Take 20 mg by mouth every morning      magnesium 200 MG TABS Take 200 mg by mouth daily      metoprolol succinate ER (TOPROL XL) 100 MG 24 hr tablet Take 100 mg by mouth every evening      potassium chloride ER (K-TAB/KLOR-CON) 10 MEQ CR tablet Take 10 mEq by mouth 2 times daily       Current Facility-Administered Medications   Medication Dose Route Frequency Provider Last Rate Last Admin    filgrastim-sndz (ZARXIO) 1,080 mcg injection  1,080 mcg Subcutaneous Daily Gianfranco Carlin MD   1,080 mcg at 07/10/24 0908               Vital Signs:   Recent vital signs reviewed            Data:      ROUTINE IP LABS (Last four results)  BMP  Recent Labs   Lab 07/09/24  0823      POTASSIUM 3.9   CHLORIDE 104   REY 9.6   CO2 26   BUN 13.1   CR 0.95   *     CBC  Recent Labs   Lab 07/10/24  0846 07/09/24  0810 07/08/24  1111   WBC 61.5* 58.0* 37.8*   RBC 3.99* 4.08* 4.31*   HGB 12.7* 12.9* 13.9   HCT 37.9* 38.8* 40.8   MCV 95 95 95   MCH 31.8 31.6 32.3   MCHC 33.5 33.2 34.1   RDW 13.5 13.6 13.6   * 161 173     INR  Recent  Labs   Lab 07/08/24  0859   INR 1.05     PROCEDURE NOTE:         Procedure Summary:   Human progenitor cell collection was performed.  A dual lumen central line was used for access and allowed for appropriate flow during the procedure.  ACD-A was used for anticoagulation. Calcium gluconate was given in the return line to offset the effects of the citrate.   The patient's vital signs were stable throughout.  The patient tolerated the procedure well without complication.  See apheresis flowsheet for additional details.     Attestation: During the procedure the patient was directly seen and evaluated by me, Gladys Buck MD.  I have reviewed the chart and pertinent laboratory findings, and discussed the patient and the current procedure with the Apheresis nursing staff.    Gladys Buck MD  Transfusion Medicine Attending  Laboratory Medicine & Pathology

## 2024-07-10 NOTE — DISCHARGE INSTRUCTIONS
Apheresis Blood Donor Center Post Instructions  You may feel tired after your procedure today.   Please call your doctor if you have:  bleeding that doesn t stop, fever, pain where a needle or tube (catheter) was placed, seizures, trouble breathing, red urine, nausea or vomiting, other health concerns.     If your symptoms are severe, call 321.  If you have a Central Venous Catheter:  Notify your doctor if you have had a fever, chills, shaking  or redness, warmth, swelling, drainage at the exit-site.  This could be a sign of infection.    The Apheresis/Blood Donor Center is open Monday-Friday 7:30 a.m. to 5 p.m.  The phone number is [phone].  A Transfusion Medicine physician can be reached after 5:00 p.m. weekdays and on weekends /Holidays by calling [phone], and asking for the physician on call.      Autologous HPC/MNC Collection:   In most cases, the cell dose report will be available tomorrow morning.   The Bone Marrow Transplant (BMT) clinic staff looks at your report, and a decision is made if you will need another collection.   Remember it is important to follow a low fat diet during the collection process. Sometimes following the procedure, your blood platelet count may be low.  If you are told your platelet count is low, you need to avoid taking aspirin/aspirin containing products and avoid heavy physical activity and activities that may result in bruising or traumatic injury.  To contact the BMT fellow or attending physician after 5 p.m. call [phone].

## 2024-07-11 ENCOUNTER — MEDICAL CORRESPONDENCE (OUTPATIENT)
Dept: TRANSPLANT | Facility: CLINIC | Age: 64
End: 2024-07-11

## 2024-07-11 DIAGNOSIS — C90.00 MULTIPLE MYELOMA, REMISSION STATUS UNSPECIFIED (H): Primary | ICD-10-CM

## 2024-07-12 ENCOUNTER — CARE COORDINATION (OUTPATIENT)
Dept: TRANSPLANT | Facility: CLINIC | Age: 64
End: 2024-07-12

## 2024-07-12 NOTE — PROGRESS NOTES
Called and reviewed upcoming appointments for Day -1 Melphalan and transplant. Patient acknowledged appointments and encourage to call with any questions.

## 2024-07-14 RX ORDER — ALLOPURINOL 300 MG/1
300 TABLET ORAL DAILY
Qty: 7 TABLET | Refills: 0 | Status: SHIPPED | OUTPATIENT
Start: 2024-07-15 | End: 2024-07-19

## 2024-07-14 RX ORDER — ONDANSETRON 8 MG/1
8 TABLET, FILM COATED ORAL EVERY 8 HOURS PRN
Qty: 30 TABLET | Refills: 0 | Status: SHIPPED | OUTPATIENT
Start: 2024-07-15

## 2024-07-14 RX ORDER — LEVOFLOXACIN 250 MG/1
250 TABLET, FILM COATED ORAL DAILY
Qty: 30 TABLET | Refills: 0 | Status: SHIPPED | OUTPATIENT
Start: 2024-07-15 | End: 2024-07-19

## 2024-07-14 RX ORDER — PANTOPRAZOLE SODIUM 40 MG/1
40 TABLET, DELAYED RELEASE ORAL DAILY
Qty: 30 TABLET | Refills: 1 | Status: SHIPPED | OUTPATIENT
Start: 2024-07-15 | End: 2024-07-19

## 2024-07-14 RX ORDER — PROCHLORPERAZINE MALEATE 5 MG
5-10 TABLET ORAL EVERY 6 HOURS PRN
Qty: 30 TABLET | Refills: 1 | Status: SHIPPED | OUTPATIENT
Start: 2024-07-15

## 2024-07-14 RX ORDER — OLANZAPINE 2.5 MG/1
5 TABLET, FILM COATED ORAL AT BEDTIME
Qty: 30 TABLET | Refills: 0 | Status: SHIPPED | OUTPATIENT
Start: 2024-07-15 | End: 2024-07-19

## 2024-07-14 RX ORDER — DEXAMETHASONE 4 MG/1
8 TABLET ORAL
Qty: 4 TABLET | Refills: 0 | Status: SHIPPED | OUTPATIENT
Start: 2024-07-15 | End: 2024-07-17

## 2024-07-14 RX ORDER — FLUCONAZOLE 200 MG/1
200 TABLET ORAL DAILY
Qty: 30 TABLET | Refills: 0 | Status: SHIPPED | OUTPATIENT
Start: 2024-07-15 | End: 2024-07-19

## 2024-07-14 RX ORDER — ACYCLOVIR 800 MG/1
800 TABLET ORAL 2 TIMES DAILY
Qty: 60 TABLET | Refills: 11 | Status: SHIPPED | OUTPATIENT
Start: 2024-07-15 | End: 2024-07-19

## 2024-07-15 ENCOUNTER — OFFICE VISIT (OUTPATIENT)
Dept: TRANSPLANT | Facility: CLINIC | Age: 64
End: 2024-07-15
Attending: INTERNAL MEDICINE
Payer: COMMERCIAL

## 2024-07-15 ENCOUNTER — APPOINTMENT (OUTPATIENT)
Dept: LAB | Facility: CLINIC | Age: 64
End: 2024-07-15
Attending: INTERNAL MEDICINE
Payer: COMMERCIAL

## 2024-07-15 VITALS
HEART RATE: 69 BPM | RESPIRATION RATE: 16 BRPM | SYSTOLIC BLOOD PRESSURE: 146 MMHG | DIASTOLIC BLOOD PRESSURE: 79 MMHG | OXYGEN SATURATION: 98 % | TEMPERATURE: 97.7 F | WEIGHT: 232.1 LBS | BODY MASS INDEX: 32.37 KG/M2

## 2024-07-15 VITALS
SYSTOLIC BLOOD PRESSURE: 143 MMHG | DIASTOLIC BLOOD PRESSURE: 84 MMHG | TEMPERATURE: 97.5 F | HEART RATE: 79 BPM | OXYGEN SATURATION: 97 %

## 2024-07-15 DIAGNOSIS — C90.00 MULTIPLE MYELOMA, REMISSION STATUS UNSPECIFIED (H): ICD-10-CM

## 2024-07-15 DIAGNOSIS — C90.00 MULTIPLE MYELOMA, REMISSION STATUS UNSPECIFIED (H): Primary | ICD-10-CM

## 2024-07-15 LAB
ALBUMIN SERPL BCG-MCNC: 4.5 G/DL (ref 3.5–5.2)
ALP SERPL-CCNC: 101 U/L (ref 40–150)
ALT SERPL W P-5'-P-CCNC: 18 U/L (ref 0–70)
ANION GAP SERPL CALCULATED.3IONS-SCNC: 9 MMOL/L (ref 7–15)
AST SERPL W P-5'-P-CCNC: 20 U/L (ref 0–45)
BASOPHILS # BLD AUTO: 0 10E3/UL (ref 0–0.2)
BASOPHILS NFR BLD AUTO: 0 %
BILIRUB SERPL-MCNC: 0.3 MG/DL
BUN SERPL-MCNC: 18.7 MG/DL (ref 8–23)
CALCIUM SERPL-MCNC: 9.3 MG/DL (ref 8.8–10.2)
CD34 ABSOLUTE COUNT COMMENT: NORMAL
CD34 CELLS # SPEC: NORMAL
CHLORIDE SERPL-SCNC: 106 MMOL/L (ref 98–107)
CREAT SERPL-MCNC: 0.86 MG/DL (ref 0.67–1.17)
EGFRCR SERPLBLD CKD-EPI 2021: >90 ML/MIN/1.73M2
EOSINOPHIL # BLD AUTO: 0.1 10E3/UL (ref 0–0.7)
EOSINOPHIL NFR BLD AUTO: 1 %
ERYTHROCYTE [DISTWIDTH] IN BLOOD BY AUTOMATED COUNT: 13.3 % (ref 10–15)
GLUCOSE SERPL-MCNC: 97 MG/DL (ref 70–99)
HCO3 SERPL-SCNC: 24 MMOL/L (ref 22–29)
HCT VFR BLD AUTO: 38.3 % (ref 40–53)
HGB BLD-MCNC: 13.2 G/DL (ref 13.3–17.7)
IMM GRANULOCYTES # BLD: 0.1 10E3/UL
IMM GRANULOCYTES NFR BLD: 2 %
LYMPHOCYTES # BLD AUTO: 0.7 10E3/UL (ref 0.8–5.3)
LYMPHOCYTES NFR BLD AUTO: 11 %
MAGNESIUM SERPL-MCNC: 2.1 MG/DL (ref 1.7–2.3)
MCH RBC QN AUTO: 31.7 PG (ref 26.5–33)
MCHC RBC AUTO-ENTMCNC: 34.5 G/DL (ref 31.5–36.5)
MCV RBC AUTO: 92 FL (ref 78–100)
MONOCYTES # BLD AUTO: 0.8 10E3/UL (ref 0–1.3)
MONOCYTES NFR BLD AUTO: 12 %
NEUTROPHILS # BLD AUTO: 5 10E3/UL (ref 1.6–8.3)
NEUTROPHILS NFR BLD AUTO: 74 %
NRBC # BLD AUTO: 0 10E3/UL
NRBC BLD AUTO-RTO: 0 /100
PLATELET # BLD AUTO: 119 10E3/UL (ref 150–450)
POTASSIUM SERPL-SCNC: 4.2 MMOL/L (ref 3.4–5.3)
PRODUCT NUMBER FLOW CYTOMETRY: NORMAL
PROT SERPL-MCNC: 7.6 G/DL (ref 6.4–8.3)
RBC # BLD AUTO: 4.16 10E6/UL (ref 4.4–5.9)
SODIUM SERPL-SCNC: 139 MMOL/L (ref 135–145)
URATE SERPL-MCNC: 4.7 MG/DL (ref 3.4–7)
WBC # BLD AUTO: 6.8 10E3/UL (ref 4–11)

## 2024-07-15 PROCEDURE — 99214 OFFICE O/P EST MOD 30 MIN: CPT | Mod: 24

## 2024-07-15 PROCEDURE — G2211 COMPLEX E/M VISIT ADD ON: HCPCS

## 2024-07-15 PROCEDURE — 96375 TX/PRO/DX INJ NEW DRUG ADDON: CPT

## 2024-07-15 PROCEDURE — 250N000011 HC RX IP 250 OP 636: Performed by: INTERNAL MEDICINE

## 2024-07-15 PROCEDURE — 85004 AUTOMATED DIFF WBC COUNT: CPT

## 2024-07-15 PROCEDURE — 83735 ASSAY OF MAGNESIUM: CPT

## 2024-07-15 PROCEDURE — 84295 ASSAY OF SERUM SODIUM: CPT | Performed by: INTERNAL MEDICINE

## 2024-07-15 PROCEDURE — 84550 ASSAY OF BLOOD/URIC ACID: CPT | Performed by: INTERNAL MEDICINE

## 2024-07-15 PROCEDURE — 99213 OFFICE O/P EST LOW 20 MIN: CPT

## 2024-07-15 PROCEDURE — 82374 ASSAY BLOOD CARBON DIOXIDE: CPT | Performed by: INTERNAL MEDICINE

## 2024-07-15 PROCEDURE — 86367 STEM CELLS TOTAL COUNT: CPT

## 2024-07-15 PROCEDURE — 96413 CHEMO IV INFUSION 1 HR: CPT

## 2024-07-15 PROCEDURE — 258N000003 HC RX IP 258 OP 636: Performed by: INTERNAL MEDICINE

## 2024-07-15 PROCEDURE — 36415 COLL VENOUS BLD VENIPUNCTURE: CPT | Performed by: INTERNAL MEDICINE

## 2024-07-15 PROCEDURE — G0463 HOSPITAL OUTPT CLINIC VISIT: HCPCS | Mod: 25

## 2024-07-15 PROCEDURE — 96367 TX/PROPH/DG ADDL SEQ IV INF: CPT

## 2024-07-15 RX ORDER — HEPARIN SODIUM,PORCINE 10 UNIT/ML
5 VIAL (ML) INTRAVENOUS
Status: DISCONTINUED | OUTPATIENT
Start: 2024-07-15 | End: 2024-07-15 | Stop reason: HOSPADM

## 2024-07-15 RX ORDER — PALONOSETRON 0.05 MG/ML
0.25 INJECTION, SOLUTION INTRAVENOUS ONCE
Status: COMPLETED | OUTPATIENT
Start: 2024-07-15 | End: 2024-07-15

## 2024-07-15 RX ADMIN — Medication 5 ML: at 10:37

## 2024-07-15 RX ADMIN — PALONOSETRON HYDROCHLORIDE 0.25 MG: 0.25 INJECTION INTRAVENOUS at 12:20

## 2024-07-15 RX ADMIN — SODIUM CHLORIDE 1000 ML: 9 INJECTION, SOLUTION INTRAVENOUS at 11:07

## 2024-07-15 RX ADMIN — MELPHALAN HYDROCHLORIDE 450 MG: KIT at 13:55

## 2024-07-15 RX ADMIN — DEXAMETHASONE SODIUM PHOSPHATE 12 MG: 10 INJECTION, SOLUTION INTRAMUSCULAR; INTRAVENOUS at 11:58

## 2024-07-15 ASSESSMENT — PAIN SCALES - GENERAL: PAINLEVEL: NO PAIN (0)

## 2024-07-15 NOTE — PROGRESS NOTES
Infusion Nursing Note:  Inocente Romero presents today for Melphelan infusion.    Patient seen by provider today: Yes: Dr. Lopez   present during visit today: Not Applicable.     Note: Labs monitored, VSS. Auto Cell  ok'd chemo for today.  Pt educated on chemotherapy. Pre-flush and pre-meds, and ice chips administered. Melphelan infused over 15mins. Post flush for an hr. Pt educated to continue ice chips post discharge.   Pt seen by care coordinator and pharmacist today.     Intravenous Access:  Valderrama.     Treatment Conditions:  Results reviewed, labs MET treatment parameters, ok to proceed with treatment.     Post Infusion Assessment:  Patient tolerated infusion without incident.      Discharge Plan:   [facility]  Patient will return tomorrow for next appointment.   Patient discharged in stable condition accompanied by: wife.  Departure Mode: Ambulatory.        Eliel Michaels RN

## 2024-07-15 NOTE — NURSING NOTE
"Oncology Rooming Note    July 15, 2024 10:51 AM   Inocente Romero is a 64 year old male who presents for:    Chief Complaint   Patient presents with    Blood Draw     Vitals taken, cvc labs drawn, caps changed, heparin locked, checked into next appt    RECHECK     MM here for provider visit     Initial Vitals: BP (!) 146/79 (BP Location: Left arm, Patient Position: Sitting, Cuff Size: Adult Large)   Pulse 69   Temp 97.7  F (36.5  C) (Oral)   Resp 16   Wt 105.3 kg (232 lb 1.6 oz)   SpO2 98%   BMI 32.37 kg/m   Estimated body mass index is 32.37 kg/m  as calculated from the following:    Height as of 7/8/24: 1.803 m (5' 11\").    Weight as of this encounter: 105.3 kg (232 lb 1.6 oz). Body surface area is 2.3 meters squared.  No Pain (0) Comment: Data Unavailable   No LMP for male patient.  Allergies reviewed: Yes  Medications reviewed: Yes    Medications: Medication refills not needed today.  Pharmacy name entered into [facility]: [facility] - [place], [place] - [address] [place] [address] [address]    Frailty Screening:   Is the patient here for a new oncology consult visit in cancer care? 2. No      Clinical concerns: None      Eliel Michaels RN              "

## 2024-07-15 NOTE — LETTER
7/15/2024      Inocente Romero  [address] [address]      Dear Colleague,    Thank you for referring your patient, Inocente Romero, to the [facility] BLOOD AND MARROW TRANSPLANT PROGRAM [place]. Please see a copy of my visit note below.    BMT/Cell Therapy Daily Progress Note   07/15/2024    Patient ID:  Inocente Romero is a 64 year old male, hx of MM, currently undergoing stem cell collection. Today is D-1 of his auto HSCT.    INTERVAL  HISTORY     Felix returns today for melphalan. Overall feeling well. Tolerated collections well without fevers or bone pain. Denies any n/v/d rashes or bleeding. No SOB or cough. He is ready to get started with chemo.     Review of Systems: 10 point ROS negative except as noted above.      PHYSICAL EXAM   BP (!) 146/79 (BP Location: Left arm, Patient Position: Sitting, Cuff Size: Adult Large)   Pulse 69   Temp 97.7  F (36.5  C) (Oral)   Resp 16   Wt 105.3 kg (232 lb 1.6 oz)   SpO2 98%   BMI 32.37 kg/m      Weight In/Out     Wt Readings from Last 3 Encounters:   07/09/24 105.6 kg (232 lb 12.9 oz)   07/08/24 104.3 kg (230 lb)   07/08/24 104.3 kg (230 lb)      [unfilled]     Vitals reviewed in flowsheets- vitals stable.   General: NAD   Eyes: sclera anicteric    Lungs: breathing comfortably on room air  Cardiovascular: well perfused   Lymphatics: no edema  Skin: no rashes or petechiae  Neuro: A&O   Additional Findings: Valderrama site no active bleeding noted    LABS AND IMAGING: I have assessed all abnormal lab values for their clinical significance and any values considered clinically significant have been addressed in the assessment and plan.        Lab Results   Component Value Date    WBC 6.8 07/15/2024    ANEU 51.0 (H) 07/10/2024    HGB 13.2 (L) 07/15/2024    HCT 38.3 (L) 07/15/2024     (L) 07/15/2024     07/15/2024    POTASSIUM 4.2 07/15/2024    CHLORIDE 106 07/15/2024    CO2 24 07/15/2024    GLC 97 07/15/2024    BUN  18.7 07/15/2024    CR 0.86 07/15/2024    MAG 1.6 (L) 07/10/2024    INR 1.05 07/08/2024       SYSTEMS-BASED ASSESSMENT AND PLAN     BMT/IEC PROTOCOL for MM Auto PBSCT  Transplants for multiple myeloma:  The patient has Standard risk MM, and the collection goal is 8 million CD34/kg for 2 transplants.     - 7/8: CD34 11. Mozobil dose 1    - 7/9: CD34 56. Collections day 1, mozobil dose 2. Collections total 5.49 x10^6 CD34/kg  -7/10: CD34 49: Collections day 2. Will be done with collections after today. Message sent to [facility] regarding collections completion.   - Pt collected 9.84e+06 CD34/kg     7/15 - D-1: 200mgm/2 Melphalan. Dose reviewed with Pharmacy. Reviewed possible side effects and what to do for them including fevers. Pt voiced his understanding and is OK to get started today.     PPx: Start Levo, Fluc, ACV     CINV ppx: Compazine, Zofran Zypreza PRN    CV:   - Continue Norvasc, lisinopril, metoprolol. Hold ASA (pt states hasn't been on for >1 mo)    FEN/Renal:  - noted on KCL 10meq BID. Ok to continue with k 3.9.  - Mg check pending. Replace per protocol.     Final plan:  D-1 Melphalan  Cells tomorrow      The longitudinal plan of care for the diagnosis(es)/condition(s) as documented were addressed during this visit. Due to the added complexity in care, I will continue to support Felix in the subsequent management and with ongoing continuity of care.    30 minutes spent on the date of the encounter doing chart review, review of test results, interpretation of tests, patient visit, and documentation      Jay Lopez MD     Division of Hematology, Oncology and Transplantation  [facility]  P: [phone]

## 2024-07-15 NOTE — PROGRESS NOTES
I met with Inocente Romero to review his medication list while receiving melphalan. Inocente Romero and his caregiver had the opportunity to ask questions regarding his therapy which were answered to their satisfaction. We specifically discussed the following items:      Changes made to scheduled medication list by today's provider include:  Added: allopurinol, dexamethasone, fluconazole, levofloxacin, olanzapine, pantoprazole, ondansetron prn, prochlorperazine prn  Deleted: ASA (only on during Revlimid, hasn't taken in a week)  Changed: none    We discussed indications, estimated durations, med box management, appropriate timing.      Current Outpatient Medications   Medication Sig Dispense Refill    acyclovir (ZOVIRAX) 800 MG tablet Take 1 tablet (800 mg) by mouth 2 times daily Start Day -1 60 tablet 11    allopurinol (ZYLOPRIM) 300 MG tablet Take 1 tablet (300 mg) by mouth daily Start Day -1 7 tablet 0    amLODIPine (NORVASC) 10 MG tablet Take 1 tablet (10 mg) by mouth daily 30 tablet 11    Cyanocobalamin 1000 MCG/ML KIT 1,000 mcg every 30 days      dexAMETHasone (DECADRON) 4 MG tablet Take 2 tablets (8 mg) by mouth daily (with breakfast) for 2 days Start Day 0.  To prevent delayed nausea/vomiting. 4 tablet 0    fluconazole (DIFLUCAN) 200 MG tablet Take 1 tablet (200 mg) by mouth daily Start Day -1 30 tablet 0    heparin lock flush 10 unit/mL Inject 5 mLs as needed for line flush (Inject 5mLs into each lumen daily) 300 mL 1    levofloxacin (LEVAQUIN) 250 MG tablet Take 1 tablet (250 mg) by mouth daily Start Day -1 30 tablet 0    lisinopril (ZESTRIL) 10 MG tablet Take 10 mg by mouth every evening      lisinopril (ZESTRIL) 20 MG tablet Take 20 mg by mouth every morning      magnesium 200 MG TABS Take 200 mg by mouth daily      metoprolol succinate ER (TOPROL XL) 100 MG 24 hr tablet Take 100 mg by mouth every evening      OLANZapine (ZYPREXA) 2.5 MG tablet Take 2 tablets (5 mg) by mouth at bedtime 30 tablet  0    ondansetron (ZOFRAN) 8 MG tablet Take 1 tablet (8 mg) by mouth every 8 hours as needed (for nausea / vomiting) 30 tablet 0    pantoprazole (PROTONIX) 40 MG EC tablet Take 1 tablet (40 mg) by mouth daily Start Day -1 30 tablet 1    potassium chloride ER (K-TAB/KLOR-CON) 10 MEQ CR tablet Take 10 mEq by mouth 2 times daily      prochlorperazine (COMPAZINE) 5 MG tablet Take 1-2 tablets (5-10 mg) by mouth every 6 hours as needed for nausea or vomiting 30 tablet 1        Pertinent labs considered today:  Lab Results   Component Value Date     07/15/2024                 normal sodium 136-148  Lab Results   Component Value Date    POTASSIUM 4.2 07/15/2024       normal potassium 3.5-5.2   Lab Results   Component Value Date    CHLORIDE 106 07/15/2024           normal chloride    Lab Results   Component Value Date    CO2 24 07/15/2024                normal CO2 20-32  Lab Results   Component Value Date    BUN 18.7 07/15/2024                 normal BUN 5-24  Lab Results   Component Value Date    GLC 97 07/15/2024                 normal glucose   Lab Results   Component Value Date    CR 0.86 07/15/2024                 normal cr 0.8-1.5  Lab Results   Component Value Date    REY 9.3 07/15/2024               normal calcium  8.5-10.4  Lab Results   Component Value Date    BILITOTAL 0.3 07/15/2024          normal bilirubin 0.2-1.3  Lab Results   Component Value Date    PROTTOTAL 7.6 07/15/2024          normal total protein 6.0-8.2  Lab Results   Component Value Date    ALBUMIN 4.5 07/15/2024             normal albumin 3.2-4.5  Lab Results   Component Value Date    ALKPHOS 101 07/15/2024            normal alkphos   Lab Results   Component Value Date    ALT 18 07/15/2024         normal ALT 0-65  Lab Results   Component Value Date    AST 20 07/15/2024         normal AST 0-37  Lab Results   Component Value Date    HGB 13.2 07/15/2024     Lab Results   Component Value Date    WBC 6.8 07/15/2024      Lab  Results   Component Value Date     07/15/2024     Estimated Creatinine Clearance: 110 mL/min (based on SCr of 0.86 mg/dL).      Ollie Mclean RP, PharmD

## 2024-07-15 NOTE — LETTER
7/15/2024      Inocente Romero  [address] [place] [address]      Dear Colleague,    Thank you for referring your patient, Inocente Romero, to the [facility] BLOOD AND MARROW TRANSPLANT PROGRAM [place]. Please see a copy of my visit note below.    I met with Inocente Romero to review his medication list while receiving melphalan. Inocente Romero and his caregiver had the opportunity to ask questions regarding his therapy which were answered to their satisfaction. We specifically discussed the following items:      Changes made to scheduled medication list by today's provider include:  Added: allopurinol, dexamethasone, fluconazole, levofloxacin, olanzapine, pantoprazole, ondansetron prn, prochlorperazine prn  Deleted: ASA (only on during Revlimid, hasn't taken in a week)  Changed: none    We discussed indications, estimated durations, med box management, appropriate timing.      Current Outpatient Medications   Medication Sig Dispense Refill    acyclovir (ZOVIRAX) 800 MG tablet Take 1 tablet (800 mg) by mouth 2 times daily Start Day -1 60 tablet 11    allopurinol (ZYLOPRIM) 300 MG tablet Take 1 tablet (300 mg) by mouth daily Start Day -1 7 tablet 0    amLODIPine (NORVASC) 10 MG tablet Take 1 tablet (10 mg) by mouth daily 30 tablet 11    Cyanocobalamin 1000 MCG/ML KIT 1,000 mcg every 30 days      dexAMETHasone (DECADRON) 4 MG tablet Take 2 tablets (8 mg) by mouth daily (with breakfast) for 2 days Start Day 0.  To prevent delayed nausea/vomiting. 4 tablet 0    fluconazole (DIFLUCAN) 200 MG tablet Take 1 tablet (200 mg) by mouth daily Start Day -1 30 tablet 0    heparin lock flush 10 unit/mL Inject 5 mLs as needed for line flush (Inject 5mLs into each lumen daily) 300 mL 1    levofloxacin (LEVAQUIN) 250 MG tablet Take 1 tablet (250 mg) by mouth daily Start Day -1 30 tablet 0    lisinopril (ZESTRIL) 10 MG tablet Take 10 mg by mouth every evening      lisinopril (ZESTRIL) 20 MG tablet  Take 20 mg by mouth every morning      magnesium 200 MG TABS Take 200 mg by mouth daily      metoprolol succinate ER (TOPROL XL) 100 MG 24 hr tablet Take 100 mg by mouth every evening      OLANZapine (ZYPREXA) 2.5 MG tablet Take 2 tablets (5 mg) by mouth at bedtime 30 tablet 0    ondansetron (ZOFRAN) 8 MG tablet Take 1 tablet (8 mg) by mouth every 8 hours as needed (for nausea / vomiting) 30 tablet 0    pantoprazole (PROTONIX) 40 MG EC tablet Take 1 tablet (40 mg) by mouth daily Start Day -1 30 tablet 1    potassium chloride ER (K-TAB/KLOR-CON) 10 MEQ CR tablet Take 10 mEq by mouth 2 times daily      prochlorperazine (COMPAZINE) 5 MG tablet Take 1-2 tablets (5-10 mg) by mouth every 6 hours as needed for nausea or vomiting 30 tablet 1        Pertinent labs considered today:  Lab Results   Component Value Date     07/15/2024                 normal sodium 136-148  Lab Results   Component Value Date    POTASSIUM 4.2 07/15/2024       normal potassium 3.5-5.2   Lab Results   Component Value Date    CHLORIDE 106 07/15/2024           normal chloride    Lab Results   Component Value Date    CO2 24 07/15/2024                normal CO2 20-32  Lab Results   Component Value Date    BUN 18.7 07/15/2024                 normal BUN 5-24  Lab Results   Component Value Date    GLC 97 07/15/2024                 normal glucose   Lab Results   Component Value Date    CR 0.86 07/15/2024                 normal cr 0.8-1.5  Lab Results   Component Value Date    REY 9.3 07/15/2024               normal calcium  8.5-10.4  Lab Results   Component Value Date    BILITOTAL 0.3 07/15/2024          normal bilirubin 0.2-1.3  Lab Results   Component Value Date    PROTTOTAL 7.6 07/15/2024          normal total protein 6.0-8.2  Lab Results   Component Value Date    ALBUMIN 4.5 07/15/2024             normal albumin 3.2-4.5  Lab Results   Component Value Date    ALKPHOS 101 07/15/2024            normal alkphos   Lab Results    Component Value Date    ALT 18 07/15/2024         normal ALT 0-65  Lab Results   Component Value Date    AST 20 07/15/2024         normal AST 0-37  Lab Results   Component Value Date    HGB 13.2 07/15/2024     Lab Results   Component Value Date    WBC 6.8 07/15/2024      Lab Results   Component Value Date     07/15/2024     Estimated Creatinine Clearance: 110 mL/min (based on SCr of 0.86 mg/dL).      Ollie Mclean, [facility], PharmD

## 2024-07-15 NOTE — NURSING NOTE
Chief Complaint   Patient presents with    Blood Draw     Vitals taken, cvc labs drawn, caps changed, heparin locked, checked into next appt     BP (!) 146/79 (BP Location: Left arm, Patient Position: Sitting, Cuff Size: Adult Large)   Pulse 69   Temp 97.7  F (36.5  C) (Oral)   Resp 16   Wt 105.3 kg (232 lb 1.6 oz)   SpO2 98%   BMI 32.37 kg/m    José Luis Villagomez RN on 7/15/2024 at 10:45 AM

## 2024-07-15 NOTE — PROGRESS NOTES
BMT/Cell Therapy Daily Progress Note   07/15/2024    Patient ID:  Inocente Romero is a 64 year old male, hx of MM, currently undergoing stem cell collection. Today is D-1 of his auto HSCT.    INTERVAL  HISTORY     Felix returns today for melphalan. Overall feeling well. Tolerated collections well without fevers or bone pain. Denies any n/v/d rashes or bleeding. No SOB or cough. He is ready to get started with chemo.     Review of Systems: 10 point ROS negative except as noted above.      PHYSICAL EXAM   BP (!) 146/79 (BP Location: Left arm, Patient Position: Sitting, Cuff Size: Adult Large)   Pulse 69   Temp 97.7  F (36.5  C) (Oral)   Resp 16   Wt 105.3 kg (232 lb 1.6 oz)   SpO2 98%   BMI 32.37 kg/m      Weight In/Out     Wt Readings from Last 3 Encounters:   07/09/24 105.6 kg (232 lb 12.9 oz)   07/08/24 104.3 kg (230 lb)   07/08/24 104.3 kg (230 lb)      [unfilled]     Vitals reviewed in flowsheets- vitals stable.   General: NAD   Eyes: sclera anicteric    Lungs: breathing comfortably on room air  Cardiovascular: well perfused   Lymphatics: no edema  Skin: no rashes or petechiae  Neuro: A&O   Additional Findings: Valderrama site no active bleeding noted    LABS AND IMAGING: I have assessed all abnormal lab values for their clinical significance and any values considered clinically significant have been addressed in the assessment and plan.        Lab Results   Component Value Date    WBC 6.8 07/15/2024    ANEU 51.0 (H) 07/10/2024    HGB 13.2 (L) 07/15/2024    HCT 38.3 (L) 07/15/2024     (L) 07/15/2024     07/15/2024    POTASSIUM 4.2 07/15/2024    CHLORIDE 106 07/15/2024    CO2 24 07/15/2024    GLC 97 07/15/2024    BUN 18.7 07/15/2024    CR 0.86 07/15/2024    MAG 1.6 (L) 07/10/2024    INR 1.05 07/08/2024       SYSTEMS-BASED ASSESSMENT AND PLAN     BMT/IEC PROTOCOL for MM Auto PBSCT  Transplants for multiple myeloma:  The patient has Standard risk MM, and the collection goal is 8 million  CD34/kg for 2 transplants.     - 7/8: CD34 11. Mozobil dose 1    - 7/9: CD34 56. Collections day 1, mozobil dose 2. Collections total 5.49 x10^6 CD34/kg  -7/10: CD34 49: Collections day 2. Will be done with collections after today. Message sent to [facility] regarding collections completion.   - Pt collected 9.84e+06 CD34/kg     7/15 - D-1: 200mgm/2 Melphalan. Dose reviewed with Pharmacy. Reviewed possible side effects and what to do for them including fevers. Pt voiced his understanding and is OK to get started today.     PPx: Start Levo, Fluc, ACV     CINV ppx: Compazine, Zofran Zypreza PRN    CV:   - Continue Norvasc, lisinopril, metoprolol. Hold ASA (pt states hasn't been on for >1 mo)    FEN/Renal:  - noted on KCL 10meq BID. Ok to continue with k 3.9.  - Mg check pending. Replace per protocol.     Final plan:  D-1 Melphalan  Cells tomorrow      The longitudinal plan of care for the diagnosis(es)/condition(s) as documented were addressed during this visit. Due to the added complexity in care, I will continue to support Felix in the subsequent management and with ongoing continuity of care.    30 minutes spent on the date of the encounter doing chart review, review of test results, interpretation of tests, patient visit, and documentation      Jay Lopez MD     Division of Hematology, Oncology and Transplantation  [facility]  P: [phone]

## 2024-07-16 ENCOUNTER — APPOINTMENT (OUTPATIENT)
Dept: LAB | Facility: CLINIC | Age: 64
End: 2024-07-16
Attending: INTERNAL MEDICINE
Payer: COMMERCIAL

## 2024-07-16 ENCOUNTER — INFUSION THERAPY VISIT (OUTPATIENT)
Dept: TRANSPLANT | Facility: CLINIC | Age: 64
End: 2024-07-16
Attending: INTERNAL MEDICINE
Payer: COMMERCIAL

## 2024-07-16 VITALS
WEIGHT: 234.8 LBS | RESPIRATION RATE: 16 BRPM | OXYGEN SATURATION: 98 % | TEMPERATURE: 97.7 F | SYSTOLIC BLOOD PRESSURE: 150 MMHG | DIASTOLIC BLOOD PRESSURE: 75 MMHG | BODY MASS INDEX: 32.75 KG/M2 | HEART RATE: 83 BPM

## 2024-07-16 VITALS
DIASTOLIC BLOOD PRESSURE: 72 MMHG | HEART RATE: 85 BPM | OXYGEN SATURATION: 97 % | TEMPERATURE: 97.8 F | RESPIRATION RATE: 16 BRPM | SYSTOLIC BLOOD PRESSURE: 132 MMHG

## 2024-07-16 DIAGNOSIS — C90.00 MULTIPLE MYELOMA, REMISSION STATUS UNSPECIFIED (H): Primary | ICD-10-CM

## 2024-07-16 LAB
ANION GAP SERPL CALCULATED.3IONS-SCNC: 10 MMOL/L (ref 7–15)
BASOPHILS # BLD AUTO: 0 10E3/UL (ref 0–0.2)
BASOPHILS NFR BLD AUTO: 0 %
BUN SERPL-MCNC: 25.4 MG/DL (ref 8–23)
CALCIUM SERPL-MCNC: 9.6 MG/DL (ref 8.8–10.4)
CHLORIDE SERPL-SCNC: 107 MMOL/L (ref 98–107)
CREAT SERPL-MCNC: 0.87 MG/DL (ref 0.67–1.17)
EGFRCR SERPLBLD CKD-EPI 2021: >90 ML/MIN/1.73M2
EOSINOPHIL # BLD AUTO: 0 10E3/UL (ref 0–0.7)
EOSINOPHIL NFR BLD AUTO: 0 %
ERYTHROCYTE [DISTWIDTH] IN BLOOD BY AUTOMATED COUNT: 13.2 % (ref 10–15)
GLUCOSE SERPL-MCNC: 162 MG/DL (ref 70–99)
HCO3 SERPL-SCNC: 22 MMOL/L (ref 22–29)
HCT VFR BLD AUTO: 36.9 % (ref 40–53)
HGB BLD-MCNC: 12.3 G/DL (ref 13.3–17.7)
IMM GRANULOCYTES # BLD: 0.1 10E3/UL
IMM GRANULOCYTES NFR BLD: 1 %
INR PPP: 1.03 (ref 0.85–1.15)
LYMPHOCYTES # BLD AUTO: 0.2 10E3/UL (ref 0.8–5.3)
LYMPHOCYTES NFR BLD AUTO: 2 %
MCH RBC QN AUTO: 31.1 PG (ref 26.5–33)
MCHC RBC AUTO-ENTMCNC: 33.3 G/DL (ref 31.5–36.5)
MCV RBC AUTO: 93 FL (ref 78–100)
MONOCYTES # BLD AUTO: 0.3 10E3/UL (ref 0–1.3)
MONOCYTES NFR BLD AUTO: 4 %
NEUTROPHILS # BLD AUTO: 7.7 10E3/UL (ref 1.6–8.3)
NEUTROPHILS NFR BLD AUTO: 93 %
NRBC # BLD AUTO: 0 10E3/UL
NRBC BLD AUTO-RTO: 0 /100
PLATELET # BLD AUTO: 135 10E3/UL (ref 150–450)
POTASSIUM SERPL-SCNC: 4.3 MMOL/L (ref 3.4–5.3)
RBC # BLD AUTO: 3.95 10E6/UL (ref 4.4–5.9)
SODIUM SERPL-SCNC: 139 MMOL/L (ref 135–145)
WBC # BLD AUTO: 8.3 10E3/UL (ref 4–11)

## 2024-07-16 PROCEDURE — 38241 TRANSPLT AUTOL HCT/DONOR: CPT

## 2024-07-16 PROCEDURE — 80048 BASIC METABOLIC PNL TOTAL CA: CPT | Performed by: INTERNAL MEDICINE

## 2024-07-16 PROCEDURE — 250N000011 HC RX IP 250 OP 636

## 2024-07-16 PROCEDURE — 38208 THAW PRESERVED STEM CELLS: CPT | Performed by: INTERNAL MEDICINE

## 2024-07-16 PROCEDURE — 250N000013 HC RX MED GY IP 250 OP 250 PS 637: Performed by: INTERNAL MEDICINE

## 2024-07-16 PROCEDURE — 99207 PR SATISFY VISIT NUMBER: CPT

## 2024-07-16 PROCEDURE — G0463 HOSPITAL OUTPT CLINIC VISIT: HCPCS | Mod: 25

## 2024-07-16 PROCEDURE — 85025 COMPLETE CBC W/AUTO DIFF WBC: CPT | Performed by: INTERNAL MEDICINE

## 2024-07-16 PROCEDURE — 999N000022 HC STATISTIC AUTOLOGOUS BM-INITIAL INFUSION

## 2024-07-16 PROCEDURE — 85610 PROTHROMBIN TIME: CPT | Performed by: INTERNAL MEDICINE

## 2024-07-16 RX ORDER — EPINEPHRINE 1 MG/ML
0.3 INJECTION, SOLUTION INTRAMUSCULAR; SUBCUTANEOUS EVERY 5 MIN PRN
Status: CANCELLED | OUTPATIENT
Start: 2024-07-17

## 2024-07-16 RX ORDER — ACETAMINOPHEN 325 MG/1
650 TABLET ORAL ONCE
Status: COMPLETED | OUTPATIENT
Start: 2024-07-16 | End: 2024-07-16

## 2024-07-16 RX ORDER — DIPHENHYDRAMINE HYDROCHLORIDE 50 MG/ML
50 INJECTION INTRAMUSCULAR; INTRAVENOUS
Status: CANCELLED
Start: 2024-07-17

## 2024-07-16 RX ORDER — HEPARIN SODIUM,PORCINE 10 UNIT/ML
5-20 VIAL (ML) INTRAVENOUS DAILY PRN
Status: CANCELLED | OUTPATIENT
Start: 2024-07-17

## 2024-07-16 RX ORDER — HEPARIN SODIUM,PORCINE 10 UNIT/ML
5 VIAL (ML) INTRAVENOUS ONCE
Status: COMPLETED | OUTPATIENT
Start: 2024-07-16 | End: 2024-07-16

## 2024-07-16 RX ORDER — HEPARIN SODIUM (PORCINE) LOCK FLUSH IV SOLN 100 UNIT/ML 100 UNIT/ML
5 SOLUTION INTRAVENOUS
Status: CANCELLED | OUTPATIENT
Start: 2024-07-17

## 2024-07-16 RX ORDER — DIPHENHYDRAMINE HCL 25 MG
25 CAPSULE ORAL ONCE
Status: COMPLETED | OUTPATIENT
Start: 2024-07-16 | End: 2024-07-16

## 2024-07-16 RX ADMIN — ACETAMINOPHEN 650 MG: 325 TABLET ORAL at 13:05

## 2024-07-16 RX ADMIN — DIPHENHYDRAMINE HYDROCHLORIDE 25 MG: 25 CAPSULE ORAL at 13:05

## 2024-07-16 RX ADMIN — Medication 5 ML: at 12:43

## 2024-07-16 RX ADMIN — Medication 5 ML: at 12:44

## 2024-07-16 ASSESSMENT — PAIN SCALES - GENERAL: PAINLEVEL: NO PAIN (0)

## 2024-07-16 NOTE — LETTER
7/16/2024      Inocente Romero  [address] [address]      Dear Colleague,    Thank you for referring your patient, Inocente Romero, to the [facility] BLOOD AND MARROW TRANSPLANT PROGRAM [place]. Please see a copy of my visit note below.    BMT/Cell Therapy Daily Progress Note   07/16/2024    Patient ID:  Inocente Romero is a 64 year old male, hx of MM, currently undergoing stem cell collection. Today is D-1 of his auto HSCT.    INTERVAL  HISTORY     Felix returns today for transplant. He is doing well. Tolerated melphalan without difficulty yesterday. He has no n/v/d/c. Eating well. No fevers or new infectious symptoms.    Review of Systems: 10 point ROS negative except as noted above.      PHYSICAL EXAM   BP (!) 150/75   Pulse 83   Temp 97.7  F (36.5  C) (Oral)   Resp 16   Wt 106.5 kg (234 lb 12.8 oz)   SpO2 98%   BMI 32.75 kg/m      Weight In/Out     Wt Readings from Last 3 Encounters:   07/16/24 106.5 kg (234 lb 12.8 oz)   07/15/24 105.3 kg (232 lb 1.6 oz)   07/09/24 105.6 kg (232 lb 12.9 oz)      [unfilled]     Vitals reviewed in flowsheets- vitals stable.   General: NAD   Eyes: sclera anicteric    Lungs: CTAB  Cardiovascular: RRR, no M/R/G  Lymphatics: no edema  Skin: no rashes or petechiae  Neuro: A&O   Additional Findings: Valderrama site no active bleeding noted    LABS AND IMAGING: I have assessed all abnormal lab values for their clinical significance and any values considered clinically significant have been addressed in the assessment and plan.        Lab Results   Component Value Date    WBC 8.3 07/16/2024    ANEU 51.0 (H) 07/10/2024    HGB 12.3 (L) 07/16/2024    HCT 36.9 (L) 07/16/2024     (L) 07/16/2024     07/16/2024    POTASSIUM 4.3 07/16/2024    CHLORIDE 107 07/16/2024    CO2 22 07/16/2024     (H) 07/16/2024    BUN 25.4 (H) 07/16/2024    CR 0.87 07/16/2024    MAG 2.1 07/15/2024    INR 1.03 07/16/2024       SYSTEMS-BASED ASSESSMENT  AND PLAN     BMT/IEC PROTOCOL for MM Auto PBSCT  Transplants for multiple myeloma:  The patient has Standard risk MM, and the collection goal is 8 million CD34/kg for 2 transplants.     - Pt collected 9.84x10^06 CD34/kg     7/15 - D-1: 200mgm/2 Melphalan. Dose reviewed with Pharmacy. Reviewed possible side effects and what to do for them including fevers.   7/16: Transplant day 0. Thawed cell dose 4.92 x10^6 Cd34/kg. Infused cell dose pending.    Infusion Procedure Note    Type: Autologous transplant  Diagnosis: MM  Indication for Infusion: Reconstitution of hematopoiesis (transplant graft)  Reviewed and Confirmed for the Infusion: Consent previously obtained  Donor:  Self  Product Characteristics, Cell Dose and Volume: as described on the infusion form (656702).  Patient was Premedicated as Ordered: Yes  Complications: See infusion form    I was present at the beginning of the infusion and available on the floor/unit/clinic through the entire infusion.    Nguyen Sharp NP    HEME/COAG  - Transfusion parameters: hgb <7g/dL and plts <10,000    ID  PPx: Start Levo, Fluc, ACV     GI  CINV ppx: Compazine, Zofran Zypreza PRN    CV:   - Continue Norvasc, lisinopril, metoprolol. Hold ASA (pt states hasn't been on for >1 mo)    FEN/Renal:  - noted on KCL 10meq BID. Ok to continue with k 3.9.  - Mg check pending. Replace per protocol.     Final plan:  Transplant today  Blood plan, therapy plan updated.  Daily appointments requested from 7/19-7/31 for lab, BRANDON, and infusion   Standing orders for CBC, BMP placed  Reviewed [facility]    RTC daily through engraftment      The longitudinal plan of care for the diagnosis(es)/condition(s) as documented were addressed during this visit. Due to the added complexity in care, I will continue to support Felix in the subsequent management and with ongoing continuity of care.    Outside of stem cell infusion time, I spent 40 minutes in the care of this patient today, which included  time necessary for preparation for the visit, obtaining history, ordering medications/tests/procedures as medically indicated, review of pertinent medical literature, counseling of the patient, communication of recommendations to the care team, and documentation time.      Nguyen Sharp, CNP  Vocera or Pager [phone]        BMT/Cellular Autologous Product Infusion         Patient Vitals for the past 24 hrs:   Temp Temp src Pulse Resp BP   07/16/24 1223 97.7  F (36.5  C) Oral 83 16 (!) 150/75      BMT INFUSION DOCUMENTATION (Last 48 Hours)       BMT/Cellular Product Infusion    No documentation.                                 Baseline Pre-Infusion Evaluation (to be completed by Provider):   Dyspnea: Grade 0 - none  Hypoxia: Grade 0 - not present  Fever: Grade 0 - afebrile  Chills: Grade 0 - none  Febrile Neutropenia: Grade 0 - not present  Sinus Bradycardia: Grade 0 - none  Hypertension: Grade 2 - stage 1 hypertension (systolic -159 mm Hg or diastolic BP 90-99 mm Hg); medical intervention indicated; recurrent or persistent (>/ 24 hours); symptomatic increase by >/ 20 mm Hg (diastolic) or to > 140/90 mm Hg if previously WNL; monotherapy indicated  Hypotension: Grade 0 - none  Chest Pain: Grade 0 - none  Bronchospasm: Grade 0 - none  Pain: Grade 0 - none  Rash: Grade 0 - None  Neurologic Specify: none    If adverse reactions, events or complications occur (fever greater than 2 degrees fahrenheit increase, and severe reactions of the following types: chills, dyspnea, bronchospasm, hyper/hypotension, hypoxia, bradycardia, chest pain, back/flank pain, hypoxia, and any other reaction deemed severe or life threatening; any instance of product bag breakage or unusual product appearance)    Any other events that are >= grade 3, then immediately contact the BMT Attending physician, the Cell Therapy Laboratory Medical Director (pager [phone]) and the Cell Therapy Laboratory ([phone]).  After midnight, holidays  & weekends contact the [facility] on the appropriate campus ([facility] [place]: [phone]; [facility] [place]: [phone]).    Nguyen Sharp NP      BMT Post Infusion Documentation    Data  Patient Vitals for the past 72 hrs:   Temp Temp src Pulse Resp BP   07/16/24 1223 97.7  F (36.5  C) Oral 83 16 (!) 150/75     BMT INFUSION DOCUMENTATION (Last 24 Hours)       BMT/Cellular Product Infusion    No documentation.                     Post-Infusion Evaluation:   Infusion Related Reaction: Grade 0 - none  Dyspnea: Grade 0 - none  Hypoxia: Grade 0 - not present  Fever: Grade 0 - afebrile  Chills: Grade 0 - none  Febrile Neutropenia: Grade 0 - not present  Sinus Bradycardia: Grade 0 - none  Hypertension: Grade 1 - prehypertension (systolic -139 mm Hg or diastolic BP 80-89 mm Hg)  Hypotension: Grade 0 - none  Chest Pain: Grade 0 - none  Bronchospasm: Grade 0 - none  Pain: Grade 0 - none  Rash: Grade 0 - None  Neurologic Specify: none    If this was a cord blood transplant, was more than one cord blood unit infused? no    Nguyen Sharp NP

## 2024-07-16 NOTE — PROGRESS NOTES
Infusion Nursing Note:  Inocente Romero presents today for Auto Day 0 cell infusion.    Patient seen by provider today: Yes: Nguyen Sharp, BRANDON   present during visit today: Not Applicable.    Note: Lab results monitored; no other infusions needed today. Given 650mg PO Tylenol and 25mg PO Benadryl 30 minutes prior to cell product infusion. VSS stable prior to cell infusion, cells checked by 2nd RN. Cell product infused without complication. VSS after 30 minutes post cell product infusion.    Type of transplant: Donor: Autologous  Product:   BMT INFUSION DOCUMENTATION (Last 48 Hours)       BMT/Cellular Product Infusion       Row Name 07/16/24 1100 07/16/24 0002             [REMOVED] Product 07/16/24 1256 HPC, Apheresis    Product Details Product Release Date: 07/16/24  - Product Release Time: 1256 -JR Product Type: HPC, Apheresis  -JR DIN: [id number]  - Product Description Code: [id number]  - Volume Dispensed (mL): 651 mL  -JR Completion Date (RN to complete): 07/16/24  - Completion Time (RN to complete): 1422  -LH    Checked by (Patient RN) Noemí Gleason RN  - --      Checked by (Witness) Mya Smith RN  -AS --      Product Volume Infused (mL) 651 mL  - --      Flush Volume (mL) 100 mL  - --      Volume Dispensed (mL) -- 651 mL  -                User Key  (r) = Recorded By, (t) = Taken By, (c) = Cosigned By      Initials Name Effective Dates    JR Basil Fontana 01/08/24 -     AS Mya Smith RN 10/10/18 -      Noemí Gleason RN 02/11/21 -                   Preparation: RN Documentation  Patient was premedicated as ordered: yes  Line Type: central line, right  Patient Stable Prior to Infusion: yes  Time Infusion Started: 1355  Teaching: side effects/monitoring  Tolerated/Reaction: Patient tolerance of product infusion  Immediate suspected transfusion reaction to the product: none  Did patient have prior history of similar signs/symptoms during this  hospitalization?: NA  Did the patient tolerate the infusion well: yes  Flush until: n/a  Plan: RTC tomorrow      Intravenous Access:  Valderrama  +BR noted pre and post infusion  Cap removed from blue lumen for cell product infusion; replaced upon completion.    Treatment Conditions:  Not Applicable.      Post Infusion Assessment:  Patient tolerated infusion without incident.       Discharge Plan:   Patient discharged in stable condition accompanied by: wife.  Departure Mode: Ambulatory.      Noemí Gleason RN

## 2024-07-16 NOTE — PROGRESS NOTES
BMT Post Infusion Documentation    Data   Patient Vitals for the past 72 hrs:   Temp Temp src Pulse Resp BP   07/16/24 1223 97.7  F (36.5  C) Oral 83 16 (!) 150/75     BMT INFUSION DOCUMENTATION (Last 24 Hours)       BMT/Cellular Product Infusion    No documentation.                     Post-Infusion Evaluation:   Infusion Related Reaction: Grade 0 - none  Dyspnea: Grade 0 - none  Hypoxia: Grade 0 - not present  Fever: Grade 0 - afebrile  Chills: Grade 0 - none  Febrile Neutropenia: Grade 0 - not present  Sinus Bradycardia: Grade 0 - none  Hypertension: Grade 1 - prehypertension (systolic -139 mm Hg or diastolic BP 80-89 mm Hg)  Hypotension: Grade 0 - none  Chest Pain: Grade 0 - none  Bronchospasm: Grade 0 - none  Pain: Grade 0 - none  Rash: Grade 0 - None  Neurologic Specify: none    If this was a cord blood transplant, was more than one cord blood unit infused? no    Nguyen Sharp, NP

## 2024-07-16 NOTE — PROGRESS NOTES
BMT/Cell Therapy Daily Progress Note   07/16/2024    Patient ID:  Inocente Romero is a 64 year old male, hx of MM, currently undergoing stem cell collection. Today is D-1 of his auto HSCT.    INTERVAL  HISTORY     Felix returns today for transplant. He is doing well. Tolerated melphalan without difficulty yesterday. He has no n/v/d/c. Eating well. No fevers or new infectious symptoms.    Review of Systems: 10 point ROS negative except as noted above.      PHYSICAL EXAM   BP (!) 150/75   Pulse 83   Temp 97.7  F (36.5  C) (Oral)   Resp 16   Wt 106.5 kg (234 lb 12.8 oz)   SpO2 98%   BMI 32.75 kg/m      Weight In/Out     Wt Readings from Last 3 Encounters:   07/16/24 106.5 kg (234 lb 12.8 oz)   07/15/24 105.3 kg (232 lb 1.6 oz)   07/09/24 105.6 kg (232 lb 12.9 oz)      [unfilled]     Vitals reviewed in flowsheets- vitals stable.   General: NAD   Eyes: sclera anicteric    Lungs: CTAB  Cardiovascular: RRR, no M/R/G  Lymphatics: no edema  Skin: no rashes or petechiae  Neuro: A&O   Additional Findings: Valderrama site no active bleeding noted    LABS AND IMAGING: I have assessed all abnormal lab values for their clinical significance and any values considered clinically significant have been addressed in the assessment and plan.        Lab Results   Component Value Date    WBC 8.3 07/16/2024    ANEU 51.0 (H) 07/10/2024    HGB 12.3 (L) 07/16/2024    HCT 36.9 (L) 07/16/2024     (L) 07/16/2024     07/16/2024    POTASSIUM 4.3 07/16/2024    CHLORIDE 107 07/16/2024    CO2 22 07/16/2024     (H) 07/16/2024    BUN 25.4 (H) 07/16/2024    CR 0.87 07/16/2024    MAG 2.1 07/15/2024    INR 1.03 07/16/2024       SYSTEMS-BASED ASSESSMENT AND PLAN     BMT/IEC PROTOCOL for MM Auto PBSCT  Transplants for multiple myeloma:  The patient has Standard risk MM, and the collection goal is 8 million CD34/kg for 2 transplants.     - Pt collected 9.84x10^06 CD34/kg     7/15 - D-1: 200mgm/2 Melphalan. Dose reviewed with  Pharmacy. Reviewed possible side effects and what to do for them including fevers.   7/16: Transplant day 0. Thawed cell dose 4.92 x10^6 Cd34/kg. Infused cell dose pending.    Infusion Procedure Note    Type: Autologous transplant  Diagnosis: MM  Indication for Infusion: Reconstitution of hematopoiesis (transplant graft)  Reviewed and Confirmed for the Infusion: Consent previously obtained  Donor:  Self  Product Characteristics, Cell Dose and Volume: as described on the infusion form ([id number]).  Patient was Premedicated as Ordered: Yes  Complications: See infusion form    I was present at the beginning of the infusion and available on the floor/unit/clinic through the entire infusion.    Nguyen Sharp NP    HEME/COAG  - Transfusion parameters: hgb <7g/dL and plts <10,000    ID  PPx: Start Levo, Fluc, ACV     GI  CINV ppx: Compazine, Zofran Zypreza PRN    CV:   - Continue Norvasc, lisinopril, metoprolol. Hold ASA (pt states hasn't been on for >1 mo)    FEN/Renal:  - noted on KCL 10meq BID. Ok to continue with k 3.9.  - Mg check pending. Replace per protocol.     Final plan:  Transplant today  Blood plan, therapy plan updated.  Daily appointments requested from 7/19-7/31 for lab, BRANDON, and infusion   Standing orders for CBC, BMP placed  Reviewed [facility]    RTC daily through engraftment      The longitudinal plan of care for the diagnosis(es)/condition(s) as documented were addressed during this visit. Due to the added complexity in care, I will continue to support Felix in the subsequent management and with ongoing continuity of care.    Outside of stem cell infusion time, I spent 40 minutes in the care of this patient today, which included time necessary for preparation for the visit, obtaining history, ordering medications/tests/procedures as medically indicated, review of pertinent medical literature, counseling of the patient, communication of recommendations to the care team, and documentation  time.      Nguyen Sharp, CNP  Vocera or Pager [phone]

## 2024-07-16 NOTE — PROGRESS NOTES
BMT/Cellular Autologous Product Infusion         Patient Vitals for the past 24 hrs:   Temp Temp src Pulse Resp BP   07/16/24 1223 97.7  F (36.5  C) Oral 83 16 (!) 150/75      BMT INFUSION DOCUMENTATION (Last 48 Hours)       BMT/Cellular Product Infusion    No documentation.                                 Baseline Pre-Infusion Evaluation (to be completed by Provider):   Dyspnea: Grade 0 - none  Hypoxia: Grade 0 - not present  Fever: Grade 0 - afebrile  Chills: Grade 0 - none  Febrile Neutropenia: Grade 0 - not present  Sinus Bradycardia: Grade 0 - none  Hypertension: Grade 2 - stage 1 hypertension (systolic -159 mm Hg or diastolic BP 90-99 mm Hg); medical intervention indicated; recurrent or persistent (>/ 24 hours); symptomatic increase by >/ 20 mm Hg (diastolic) or to > 140/90 mm Hg if previously WNL; monotherapy indicated  Hypotension: Grade 0 - none  Chest Pain: Grade 0 - none  Bronchospasm: Grade 0 - none  Pain: Grade 0 - none  Rash: Grade 0 - None  Neurologic Specify: none    If adverse reactions, events or complications occur (fever greater than 2 degrees fahrenheit increase, and severe reactions of the following types: chills, dyspnea, bronchospasm, hyper/hypotension, hypoxia, bradycardia, chest pain, back/flank pain, hypoxia, and any other reaction deemed severe or life threatening; any instance of product bag breakage or unusual product appearance)    Any other events that are >= grade 3, then immediately contact the BMT Attending physician, the Cell Therapy Laboratory Medical Director (pager [phone]) and the Cell Therapy Laboratory ([phone]).  After midnight, holidays & weekends contact the [facility] on the appropriate campus ([facility] [place]: [phone]; [facility] [place]: [phone]).    Nguyen Sharp NP

## 2024-07-16 NOTE — NURSING NOTE
"Oncology Rooming Note    July 16, 2024 12:54 PM   Inocente Romero is a 64 year old male who presents for:    Chief Complaint   Patient presents with    Oncology Clinic Visit     Multiple Myeloma     Initial Vitals: BP (!) 150/75   Pulse 83   Temp 97.7  F (36.5  C) (Oral)   Resp 16   Wt 106.5 kg (234 lb 12.8 oz)   SpO2 98%   BMI 32.75 kg/m   Estimated body mass index is 32.75 kg/m  as calculated from the following:    Height as of 7/8/24: 1.803 m (5' 11\").    Weight as of this encounter: 106.5 kg (234 lb 12.8 oz). Body surface area is 2.31 meters squared.  No Pain (0) Comment: Data Unavailable   No LMP for male patient.  Allergies reviewed: Yes  Medications reviewed: Yes    Medications: Medication refills not needed today.  Pharmacy name entered into [employer]: [facility] - [address]    Frailty Screening:   Is the patient here for a new oncology consult visit in cancer care? 2. No      Clinical concerns:  Patient states there are no new concerns to discuss with provider. was not.        Sushila Mancera CMA              "

## 2024-07-17 ENCOUNTER — ALLIED HEALTH/NURSE VISIT (OUTPATIENT)
Dept: TRANSPLANT | Facility: CLINIC | Age: 64
End: 2024-07-17
Attending: STUDENT IN AN ORGANIZED HEALTH CARE EDUCATION/TRAINING PROGRAM
Payer: COMMERCIAL

## 2024-07-17 ENCOUNTER — APPOINTMENT (OUTPATIENT)
Dept: LAB | Facility: CLINIC | Age: 64
End: 2024-07-17
Attending: STUDENT IN AN ORGANIZED HEALTH CARE EDUCATION/TRAINING PROGRAM
Payer: COMMERCIAL

## 2024-07-17 VITALS
HEART RATE: 79 BPM | RESPIRATION RATE: 18 BRPM | DIASTOLIC BLOOD PRESSURE: 90 MMHG | OXYGEN SATURATION: 99 % | BODY MASS INDEX: 32.83 KG/M2 | SYSTOLIC BLOOD PRESSURE: 159 MMHG | TEMPERATURE: 97.6 F | WEIGHT: 235.4 LBS

## 2024-07-17 DIAGNOSIS — C90.00 MULTIPLE MYELOMA, REMISSION STATUS UNSPECIFIED (H): ICD-10-CM

## 2024-07-17 DIAGNOSIS — Z71.9 VISIT FOR COUNSELING: Primary | ICD-10-CM

## 2024-07-17 LAB
ANION GAP SERPL CALCULATED.3IONS-SCNC: 9 MMOL/L (ref 7–15)
BASOPHILS # BLD AUTO: 0 10E3/UL (ref 0–0.2)
BASOPHILS NFR BLD AUTO: 0 %
BILL ONLY STEM CELL INFUSION: NORMAL
BUN SERPL-MCNC: 25.3 MG/DL (ref 8–23)
CALCIUM SERPL-MCNC: 8.9 MG/DL (ref 8.8–10.4)
CHLORIDE SERPL-SCNC: 107 MMOL/L (ref 98–107)
CREAT SERPL-MCNC: 0.81 MG/DL (ref 0.67–1.17)
EGFRCR SERPLBLD CKD-EPI 2021: >90 ML/MIN/1.73M2
EOSINOPHIL # BLD AUTO: 0 10E3/UL (ref 0–0.7)
EOSINOPHIL NFR BLD AUTO: 0 %
ERYTHROCYTE [DISTWIDTH] IN BLOOD BY AUTOMATED COUNT: 13.5 % (ref 10–15)
GLUCOSE SERPL-MCNC: 185 MG/DL (ref 70–99)
HCO3 SERPL-SCNC: 23 MMOL/L (ref 22–29)
HCT VFR BLD AUTO: 35.6 % (ref 40–53)
HGB BLD-MCNC: 12 G/DL (ref 13.3–17.7)
IMM GRANULOCYTES # BLD: 0.2 10E3/UL
IMM GRANULOCYTES NFR BLD: 1 %
LYMPHOCYTES # BLD AUTO: 0.2 10E3/UL (ref 0.8–5.3)
LYMPHOCYTES NFR BLD AUTO: 1 %
MCH RBC QN AUTO: 31.7 PG (ref 26.5–33)
MCHC RBC AUTO-ENTMCNC: 33.7 G/DL (ref 31.5–36.5)
MCV RBC AUTO: 94 FL (ref 78–100)
MONOCYTES # BLD AUTO: 0.3 10E3/UL (ref 0–1.3)
MONOCYTES NFR BLD AUTO: 2 %
NEUTROPHILS # BLD AUTO: 13.7 10E3/UL (ref 1.6–8.3)
NEUTROPHILS NFR BLD AUTO: 96 %
NRBC # BLD AUTO: 0 10E3/UL
NRBC BLD AUTO-RTO: 0 /100
PLATELET # BLD AUTO: 125 10E3/UL (ref 150–450)
POTASSIUM SERPL-SCNC: 4.1 MMOL/L (ref 3.4–5.3)
RBC # BLD AUTO: 3.78 10E6/UL (ref 4.4–5.9)
SODIUM SERPL-SCNC: 139 MMOL/L (ref 135–145)
WBC # BLD AUTO: 14.4 10E3/UL (ref 4–11)

## 2024-07-17 PROCEDURE — 85025 COMPLETE CBC W/AUTO DIFF WBC: CPT

## 2024-07-17 PROCEDURE — 36415 COLL VENOUS BLD VENIPUNCTURE: CPT

## 2024-07-17 PROCEDURE — G2211 COMPLEX E/M VISIT ADD ON: HCPCS

## 2024-07-17 PROCEDURE — 80048 BASIC METABOLIC PNL TOTAL CA: CPT

## 2024-07-17 PROCEDURE — G0463 HOSPITAL OUTPT CLINIC VISIT: HCPCS

## 2024-07-17 PROCEDURE — 250N000011 HC RX IP 250 OP 636: Performed by: STUDENT IN AN ORGANIZED HEALTH CARE EDUCATION/TRAINING PROGRAM

## 2024-07-17 PROCEDURE — 99213 OFFICE O/P EST LOW 20 MIN: CPT

## 2024-07-17 PROCEDURE — 99215 OFFICE O/P EST HI 40 MIN: CPT | Mod: 24

## 2024-07-17 RX ORDER — HEPARIN SODIUM,PORCINE 10 UNIT/ML
5 VIAL (ML) INTRAVENOUS
Status: DISCONTINUED | OUTPATIENT
Start: 2024-07-17 | End: 2024-07-17 | Stop reason: HOSPADM

## 2024-07-17 RX ADMIN — Medication 5 ML: at 07:09

## 2024-07-17 ASSESSMENT — PAIN SCALES - GENERAL: PAINLEVEL: NO PAIN (0)

## 2024-07-17 NOTE — PROGRESS NOTES
BMT/Cell Therapy Daily Progress Note   07/17/2024    Patient ID:  Inocente Romero is a 64 year old male, hx of MM, currently undergoing stem cell collection. Today is D+1 of his auto HSCT.    INTERVAL  HISTORY     Transplant yesterday without complications. No issues overnight. He has no n/v/d/c. Eating well. No fevers or new infectious symptoms. He and his wife asked a few appropriate questions.    Review of Systems: 10 point ROS negative except as noted above.      PHYSICAL EXAM   BP (!) 159/90   Pulse 79   Temp 97.6  F (36.4  C) (Oral)   Resp 18   Wt 106.8 kg (235 lb 6.4 oz)   SpO2 99%   BMI 32.83 kg/m      Weight In/Out     Wt Readings from Last 3 Encounters:   07/17/24 106.8 kg (235 lb 6.4 oz)   07/16/24 106.5 kg (234 lb 12.8 oz)   07/15/24 105.3 kg (232 lb 1.6 oz)      [unfilled]     General: NAD   Eyes: sclera anicteric    Lungs: CTAB  Cardiovascular: RRR, no M/R/G  Lymphatics: no edema  Skin: no rashes or petechiae  Neuro: A&O   Additional Findings: Valderrama site no active bleeding noted    LABS AND IMAGING: I have assessed all abnormal lab values for their clinical significance and any values considered clinically significant have been addressed in the assessment and plan.        Lab Results   Component Value Date    WBC 14.4 (H) 07/17/2024    ANEU 51.0 (H) 07/10/2024    HGB 12.0 (L) 07/17/2024    HCT 35.6 (L) 07/17/2024     (L) 07/17/2024     07/17/2024    POTASSIUM 4.1 07/17/2024    CHLORIDE 107 07/17/2024    CO2 23 07/17/2024     (H) 07/17/2024    BUN 25.3 (H) 07/17/2024    CR 0.81 07/17/2024    MAG 2.1 07/15/2024    INR 1.03 07/16/2024       SYSTEMS-BASED ASSESSMENT AND PLAN     BMT/IEC PROTOCOL for MM Auto PBSCT  Transplants for multiple myeloma:  The patient has Standard risk MM, and the collection goal is 8 million CD34/kg for 2 transplants.     Pt collected 9.84x10^06 CD34/kg   7/15 - D-1: 200mgm/2 Melphalan. Dose reviewed with Pharmacy. Reviewed possible side  effects and what to do for them including fevers.   7/16: Transplant day 0. Thawed cell dose 4.92 x10^6 Cd34/kg. Infused cell dose pending.      HEME/COAG  - Transfusion parameters: hgb <7g/dL and plts <10,000    ID  PPx: Levo, Fluc, ACV     GI  CINV ppx: Compazine, Zofran Zypreza PRN    CV:   - Continue Norvasc, lisinopril, metoprolol. Hold ASA (pt states hasn't been on for >1 mo). BP borderline today likely impacted by recent dex. No change to meds, will likely down trend    FEN/Renal:  - noted on KCL 10meq BID. Ok to continue for now      Final plan:  He and his wife can flush line tomorrow  Social work visit today    RTC ok to have tomorrow off, then plan for daily      The longitudinal plan of care for the diagnosis(es)/condition(s) as documented were addressed during this visit. Due to the added complexity in care, I will continue to support Felix in the subsequent management and with ongoing continuity of care.    Outside of stem cell infusion time, I spent 40 minutes in the care of this patient today, which included time necessary for preparation for the visit, obtaining history, ordering medications/tests/procedures as medically indicated, review of pertinent medical literature, counseling of the patient, communication of recommendations to the care team, and documentation time.    Lillie Umaña PA-C on 7/17/2024 at 8:42 AM

## 2024-07-17 NOTE — NURSING NOTE
Chief Complaint   Patient presents with    Blood Draw     Labs drawn via CVC by RN.      Labs drawn via CVC by RN. Flushed with saline and heparin. Pt tolerated well. Vitals taken. Pt checked into next appointment.    Jenny Jimenez RN

## 2024-07-17 NOTE — PROGRESS NOTES
"OUTPATIENT AUTOLOGOUS BMT  BMT SOCIAL WORK POST-TRANSPLANT NOTE    Focus: Discharge Plan    Data: Pt is a 64 year old male who is Day + 1 s/p Auto PBSCT for diagnosis of MM.    Interventions: HAMIDA met with pt and his wife Comfort to assess coping, provide psychosocial support and provide packet with post-transplant resources for patient and caregiver. Pt shared that he feels \"great\" and \"normal.\" He and wife expressed gratitude for having a rest day from clinic tomorrow. Pt shared he feels that he set himself up with at home for the BMT recovery process. Pt processedt he anticipation of knowing he may not feel well in the coming days of BMT recovery. He describes himself \"like a duck, I let things just roll off of me.\" He processed that he is taking each day as it comes and focusing on himself and his own experience each day. SW provided empathetic listening, validation of concerns, and encouragement. Pt's wife shared that she is doing well and declines concerns. SW encouraged pt to contact SW for support, questions and/or resources.     Education Provided: Post-Transplant Resource Packet, Caregiver Self-Care Education, Expected Emotional Responses to Post-Transplant.    Assessment: Pt presented as pleasant and engaged.  Pt appears to be coping appropriately. Pt continues to be supported by his wife.    Plan: Pt to return home from clinic with his wife Comfort as primary caregiver. HAMIDA will continue to provide psychosocial support and assistance with resources as needed. HAMIDA will continue to collaborate with multidisciplinary team regarding pt's plan of care.     ANALI Hughes, [facility]  Adult Blood & Marrow Transplant   Phone: [phone]  [employer] [facility] at BMT SW 2    "

## 2024-07-17 NOTE — LETTER
7/17/2024      Inocente Romero  [address]  [place] [address]      Dear Colleague,    Thank you for referring your patient, Inocente Romero, to the [facility] BLOOD AND MARROW TRANSPLANT PROGRAM [place]. Please see a copy of my visit note below.    BMT/Cell Therapy Daily Progress Note   07/17/2024    Patient ID:  Inocente Romero is a 64 year old male, hx of MM, currently undergoing stem cell collection. Today is D+1 of his auto HSCT.    INTERVAL  HISTORY     Transplant yesterday without complications. No issues overnight. He has no n/v/d/c. Eating well. No fevers or new infectious symptoms. He and his wife asked a few appropriate questions.    Review of Systems: 10 point ROS negative except as noted above.      PHYSICAL EXAM   BP (!) 159/90   Pulse 79   Temp 97.6  F (36.4  C) (Oral)   Resp 18   Wt 106.8 kg (235 lb 6.4 oz)   SpO2 99%   BMI 32.83 kg/m      Weight In/Out     Wt Readings from Last 3 Encounters:   07/17/24 106.8 kg (235 lb 6.4 oz)   07/16/24 106.5 kg (234 lb 12.8 oz)   07/15/24 105.3 kg (232 lb 1.6 oz)      [unfilled]     General: NAD   Eyes: sclera anicteric    Lungs: CTAB  Cardiovascular: RRR, no M/R/G  Lymphatics: no edema  Skin: no rashes or petechiae  Neuro: A&O   Additional Findings: Valderrama site no active bleeding noted    LABS AND IMAGING: I have assessed all abnormal lab values for their clinical significance and any values considered clinically significant have been addressed in the assessment and plan.        Lab Results   Component Value Date    WBC 14.4 (H) 07/17/2024    ANEU 51.0 (H) 07/10/2024    HGB 12.0 (L) 07/17/2024    HCT 35.6 (L) 07/17/2024     (L) 07/17/2024     07/17/2024    POTASSIUM 4.1 07/17/2024    CHLORIDE 107 07/17/2024    CO2 23 07/17/2024     (H) 07/17/2024    BUN 25.3 (H) 07/17/2024    CR 0.81 07/17/2024    MAG 2.1 07/15/2024    INR 1.03 07/16/2024       SYSTEMS-BASED ASSESSMENT AND PLAN     BMT/IEC PROTOCOL for  MM Auto PBSCT  Transplants for multiple myeloma:  The patient has Standard risk MM, and the collection goal is 8 million CD34/kg for 2 transplants.     Pt collected 9.84x10^06 CD34/kg   7/15 - D-1: 200mgm/2 Melphalan. Dose reviewed with Pharmacy. Reviewed possible side effects and what to do for them including fevers.   7/16: Transplant day 0. Thawed cell dose 4.92 x10^6 Cd34/kg. Infused cell dose pending.      HEME/COAG  - Transfusion parameters: hgb <7g/dL and plts <10,000    ID  PPx: Levo, Fluc, ACV     GI  CINV ppx: Compazine, Zofran Zypreza PRN    CV:   - Continue Norvasc, lisinopril, metoprolol. Hold ASA (pt states hasn't been on for >1 mo). BP borderline today likely impacted by recent dex. No change to meds, will likely down trend    FEN/Renal:  - noted on KCL 10meq BID. Ok to continue for now      Final plan:  He and his wife can flush line tomorrow  Social work visit today    RTC ok to have tomorrow off, then plan for daily      The longitudinal plan of care for the diagnosis(es)/condition(s) as documented were addressed during this visit. Due to the added complexity in care, I will continue to support Felix in the subsequent management and with ongoing continuity of care.    Outside of stem cell infusion time, I spent 40 minutes in the care of this patient today, which included time necessary for preparation for the visit, obtaining history, ordering medications/tests/procedures as medically indicated, review of pertinent medical literature, counseling of the patient, communication of recommendations to the care team, and documentation time.    Lillie Umaña PA-C on 7/17/2024 at 8:42 AM

## 2024-07-17 NOTE — NURSING NOTE
"Oncology Rooming Note    July 17, 2024 7:47 AM   Inocente Romero is a 64 year old male who presents for:    Chief Complaint   Patient presents with    Blood Draw     Labs drawn via CVC by RN.     Oncology Clinic Visit     Multiple Myeloma      Initial Vitals: BP (!) 159/90   Pulse 79   Temp 97.6  F (36.4  C) (Oral)   Resp 18   Wt 106.8 kg (235 lb 6.4 oz)   SpO2 99%   BMI 32.83 kg/m   Estimated body mass index is 32.83 kg/m  as calculated from the following:    Height as of 7/8/24: 1.803 m (5' 11\").    Weight as of this encounter: 106.8 kg (235 lb 6.4 oz). Body surface area is 2.31 meters squared.  No Pain (0) Comment: Data Unavailable   No LMP for male patient.  Allergies reviewed: Yes  Medications reviewed: Yes    Medications: Medication refills not needed today.  Pharmacy name entered into "[employer]: [facility] - [address]    Frailty Screening:   Is the patient here for a new oncology consult visit in cancer care? 2. No      Clinical concerns:  WBC was raised from yesterday-wondering why      Zina Machado              "

## 2024-07-19 ENCOUNTER — APPOINTMENT (OUTPATIENT)
Dept: LAB | Facility: CLINIC | Age: 64
End: 2024-07-19
Attending: INTERNAL MEDICINE
Payer: COMMERCIAL

## 2024-07-19 ENCOUNTER — TRANSFERRED RECORDS (OUTPATIENT)
Dept: HEALTH INFORMATION MANAGEMENT | Facility: CLINIC | Age: 64
End: 2024-07-19

## 2024-07-19 ENCOUNTER — ONCOLOGY VISIT (OUTPATIENT)
Dept: TRANSPLANT | Facility: CLINIC | Age: 64
End: 2024-07-19
Attending: INTERNAL MEDICINE
Payer: COMMERCIAL

## 2024-07-19 VITALS
SYSTOLIC BLOOD PRESSURE: 118 MMHG | BODY MASS INDEX: 32.57 KG/M2 | WEIGHT: 233.5 LBS | OXYGEN SATURATION: 97 % | TEMPERATURE: 97.8 F | HEART RATE: 83 BPM | RESPIRATION RATE: 16 BRPM | DIASTOLIC BLOOD PRESSURE: 63 MMHG

## 2024-07-19 DIAGNOSIS — I10 BENIGN ESSENTIAL HYPERTENSION: ICD-10-CM

## 2024-07-19 DIAGNOSIS — C90.00 MULTIPLE MYELOMA, REMISSION STATUS UNSPECIFIED (H): ICD-10-CM

## 2024-07-19 DIAGNOSIS — C90.00 MULTIPLE MYELOMA, REMISSION STATUS UNSPECIFIED (H): Primary | ICD-10-CM

## 2024-07-19 PROCEDURE — 250N000011 HC RX IP 250 OP 636: Performed by: INTERNAL MEDICINE

## 2024-07-19 PROCEDURE — 99213 OFFICE O/P EST LOW 20 MIN: CPT

## 2024-07-19 PROCEDURE — G0463 HOSPITAL OUTPT CLINIC VISIT: HCPCS

## 2024-07-19 PROCEDURE — G2211 COMPLEX E/M VISIT ADD ON: HCPCS

## 2024-07-19 RX ORDER — HEPARIN SODIUM,PORCINE 10 UNIT/ML
5 VIAL (ML) INTRAVENOUS ONCE
Status: COMPLETED | OUTPATIENT
Start: 2024-07-19 | End: 2024-07-19

## 2024-07-19 RX ORDER — ACYCLOVIR 800 MG/1
800 TABLET ORAL 2 TIMES DAILY
Qty: 60 TABLET | Refills: 11 | Status: SHIPPED | OUTPATIENT
Start: 2024-07-19

## 2024-07-19 RX ORDER — AMLODIPINE BESYLATE 10 MG/1
10 TABLET ORAL DAILY
Qty: 30 TABLET | Refills: 11 | Status: SHIPPED | OUTPATIENT
Start: 2024-07-19 | End: 2024-07-21

## 2024-07-19 RX ORDER — FLUCONAZOLE 200 MG/1
200 TABLET ORAL DAILY
Qty: 30 TABLET | Refills: 0 | Status: ON HOLD | OUTPATIENT
Start: 2024-07-19 | End: 2024-07-30

## 2024-07-19 RX ORDER — PANTOPRAZOLE SODIUM 40 MG/1
40 TABLET, DELAYED RELEASE ORAL DAILY
Qty: 30 TABLET | Refills: 1 | Status: SHIPPED | OUTPATIENT
Start: 2024-07-19 | End: 2024-07-21

## 2024-07-19 RX ORDER — OLANZAPINE 2.5 MG/1
5 TABLET, FILM COATED ORAL AT BEDTIME
Qty: 30 TABLET | Refills: 0 | Status: ON HOLD | OUTPATIENT
Start: 2024-07-19 | End: 2024-07-30

## 2024-07-19 RX ORDER — ALLOPURINOL 300 MG/1
300 TABLET ORAL DAILY
Qty: 7 TABLET | Refills: 0 | Status: SHIPPED | OUTPATIENT
Start: 2024-07-19 | End: 2024-07-21

## 2024-07-19 RX ORDER — LEVOFLOXACIN 250 MG/1
250 TABLET, FILM COATED ORAL DAILY
Qty: 30 TABLET | Refills: 0 | Status: SHIPPED | OUTPATIENT
Start: 2024-07-19 | End: 2024-07-21

## 2024-07-19 RX ADMIN — Medication 5 ML: at 13:19

## 2024-07-19 ASSESSMENT — PAIN SCALES - GENERAL: PAINLEVEL: NO PAIN (0)

## 2024-07-19 NOTE — LETTER
7/19/2024      Inocente Romero  [address]  [place] [address]      Dear Colleague,    Thank you for referring your patient, Inocente Romero, to the [facility] BLOOD AND MARROW TRANSPLANT PROGRAM [place]. Please see a copy of my visit note below.    BMT/Cell Therapy Daily Progress Note   07/19/2024    Patient ID:  Inocente Romero is a 64 year old male, hx of MM, currently undergoing stem cell collection. Today is D+3 of his auto HSCT.    INTERVAL  HISTORY     Felix returns for follow up today. He is doing well. He has no new medical complaints- some mild discomfort in his abdomen after eating breakfast this morning- he thinks this may be because he is hungry as he has not eaten lunch yet. No diarrhea. No fevers. Overall doing very well. He and his wife asked  appropriate questions.    Review of Systems: 10 point ROS negative except as noted above.      PHYSICAL EXAM   /63   Pulse 83   Temp 97.8  F (36.6  C) (Oral)   Resp 16   Wt 105.9 kg (233 lb 8 oz)   SpO2 97%   BMI 32.57 kg/m      Weight In/Out     Wt Readings from Last 3 Encounters:   07/19/24 105.9 kg (233 lb 8 oz)   07/17/24 106.8 kg (235 lb 6.4 oz)   07/16/24 106.5 kg (234 lb 12.8 oz)      [unfilled]     General: NAD   Eyes: sclera anicteric    Lungs: CTAB  Cardiovascular: RRR, no M/R/G  Lymphatics: no edema  Skin: no rashes or petechiae  Neuro: A&O   Additional Findings: Valderrama site no active bleeding noted    LABS AND IMAGING: I have assessed all abnormal lab values for their clinical significance and any values considered clinically significant have been addressed in the assessment and plan.        Lab Results   Component Value Date    WBC 4.1 07/19/2024    ANEU 3.9 07/19/2024    HGB 12.0 (L) 07/19/2024    HCT 35.7 (L) 07/19/2024     (L) 07/19/2024     07/19/2024    POTASSIUM 4.0 07/19/2024    CHLORIDE 107 07/19/2024    CO2 25 07/19/2024     (H) 07/19/2024    BUN 25.6 (H) 07/19/2024    CR  0.82 07/19/2024    MAG 2.1 07/15/2024    INR 1.03 07/16/2024       SYSTEMS-BASED ASSESSMENT AND PLAN     BMT/IEC PROTOCOL for MM Auto PBSCT  Transplants for multiple myeloma:  The patient has Standard risk MM, and the collection goal is 8 million CD34/kg for 2 transplants.     Pt collected 9.84x10^06 CD34/kg   7/15 - D-1: 200mgm/2 Melphalan. Dose reviewed with Pharmacy. Reviewed possible side effects and what to do for them including fevers.   7/16: Transplant day 0. Thawed cell dose 4.92 x10^6 Cd34/kg. Infused cell dose 3.77x10^6 CD34/kg.    HEME/COAG  - Transfusion parameters: hgb <7g/dL and plts <10,000    ID  PPx: Levo, Fluc, ACV     GI  CINV ppx: Compazine, Zofran, Zypreza PRN    CV:   - Continue Norvasc, lisinopril, metoprolol. Hold ASA (pt states hasn't been on for >1 mo). No change to meds, will likely down trend    FEN/Renal:  - noted on KCL 10meq BID. Ok to continue for now      Final plan:  RTC daily through engraftment. Standing CBC, BMP placed.       The longitudinal plan of care for the diagnosis(es)/condition(s) as documented were addressed during this visit. Due to the added complexity in care, I will continue to support Felix in the subsequent management and with ongoing continuity of care.    I spent 20 minutes in the care of this patient today, which included time necessary for preparation for the visit, obtaining history, ordering medications/tests/procedures as medically indicated, review of pertinent medical literature, counseling of the patient, communication of recommendations to the care team, and documentation time.    Nguyen Sharp NP

## 2024-07-19 NOTE — NURSING NOTE
"Oncology Rooming Note    July 19, 2024 1:30 PM   Inocente Romero is a 64 year old male who presents for:    Chief Complaint   Patient presents with    Blood Draw     Labs drawn via CVC by RN in lab.  VS taken    Oncology Clinic Visit     RTN multiple myeloma remission [facility] discharge      Initial Vitals: /63   Pulse 83   Temp 97.8  F (36.6  C) (Oral)   Resp 16   Wt 105.9 kg (233 lb 8 oz)   SpO2 97%   BMI 32.57 kg/m   Estimated body mass index is 32.57 kg/m  as calculated from the following:    Height as of 7/8/24: 1.803 m (5' 11\").    Weight as of this encounter: 105.9 kg (233 lb 8 oz). Body surface area is 2.3 meters squared.  No Pain (0) Comment: Data Unavailable   No LMP for male patient.  Allergies reviewed: Yes  Medications reviewed: Yes    Medications: Medication refills not needed today.  Pharmacy name entered into EP[employer]:    [facility] - [place], [place] - [address] [place] [address] PHARMACY - [place], [place] - [facility]    Frailty Screening:   Is the patient here for a new oncology consult visit in cancer care? 2. No      Clinical concerns: face feels numb and tingly, unable discern the temp in his mouth ex: drinking cold water unable to tell if it is cold.      Linda Stone             "

## 2024-07-19 NOTE — PROGRESS NOTES
BMT/Cell Therapy Daily Progress Note   07/19/2024    Patient ID:  Inocente Romero is a 64 year old male, hx of MM, currently undergoing stem cell collection. Today is D+3 of his auto HSCT.    INTERVAL  HISTORY     Felix returns for follow up today. He is doing well. He has no new medical complaints- some mild discomfort in his abdomen after eating breakfast this morning- he thinks this may be because he is hungry as he has not eaten lunch yet. No diarrhea. No fevers. Overall doing very well. He and his wife asked  appropriate questions.    Review of Systems: 10 point ROS negative except as noted above.      PHYSICAL EXAM   /63   Pulse 83   Temp 97.8  F (36.6  C) (Oral)   Resp 16   Wt 105.9 kg (233 lb 8 oz)   SpO2 97%   BMI 32.57 kg/m      Weight In/Out     Wt Readings from Last 3 Encounters:   07/19/24 105.9 kg (233 lb 8 oz)   07/17/24 106.8 kg (235 lb 6.4 oz)   07/16/24 106.5 kg (234 lb 12.8 oz)      [unfilled]     General: NAD   Eyes: sclera anicteric    Lungs: CTAB  Cardiovascular: RRR, no M/R/G  Lymphatics: no edema  Skin: no rashes or petechiae  Neuro: A&O   Additional Findings: Valderrama site no active bleeding noted    LABS AND IMAGING: I have assessed all abnormal lab values for their clinical significance and any values considered clinically significant have been addressed in the assessment and plan.        Lab Results   Component Value Date    WBC 4.1 07/19/2024    ANEU 3.9 07/19/2024    HGB 12.0 (L) 07/19/2024    HCT 35.7 (L) 07/19/2024     (L) 07/19/2024     07/19/2024    POTASSIUM 4.0 07/19/2024    CHLORIDE 107 07/19/2024    CO2 25 07/19/2024     (H) 07/19/2024    BUN 25.6 (H) 07/19/2024    CR 0.82 07/19/2024    MAG 2.1 07/15/2024    INR 1.03 07/16/2024       SYSTEMS-BASED ASSESSMENT AND PLAN     BMT/IEC PROTOCOL for MM Auto PBSCT  Transplants for multiple myeloma:  The patient has Standard risk MM, and the collection goal is 8 million CD34/kg for 2 transplants.      Pt collected 9.84x10^06 CD34/kg   7/15 - D-1: 200mgm/2 Melphalan. Dose reviewed with Pharmacy. Reviewed possible side effects and what to do for them including fevers.   7/16: Transplant day 0. Thawed cell dose 4.92 x10^6 Cd34/kg. Infused cell dose 3.77x10^6 CD34/kg.    HEME/COAG  - Transfusion parameters: hgb <7g/dL and plts <10,000    ID  PPx: Levo, Fluc, ACV     GI  CINV ppx: Compazine, Zofran, Zypreza PRN    CV:   - Continue Norvasc, lisinopril, metoprolol. Hold ASA (pt states hasn't been on for >1 mo). No change to meds, will likely down trend    FEN/Renal:  - noted on KCL 10meq BID. Ok to continue for now      Final plan:  RTC daily through engraftment. Standing CBC, BMP placed.       The longitudinal plan of care for the diagnosis(es)/condition(s) as documented were addressed during this visit. Due to the added complexity in care, I will continue to support Felix in the subsequent management and with ongoing continuity of care.    I spent 20 minutes in the care of this patient today, which included time necessary for preparation for the visit, obtaining history, ordering medications/tests/procedures as medically indicated, review of pertinent medical literature, counseling of the patient, communication of recommendations to the care team, and documentation time.    Nguyen Sharp NP

## 2024-07-19 NOTE — NURSING NOTE
Chief Complaint   Patient presents with    Blood Draw     Labs drawn via CVC by RN in lab.  VS taken       Labs collected from CVC by RN, line flushed with saline and heparin.  Vitals taken. Pt checked in for appointment(s).    Miryam Mcintyre RN

## 2024-07-20 ENCOUNTER — ONCOLOGY VISIT (OUTPATIENT)
Dept: TRANSPLANT | Facility: CLINIC | Age: 64
End: 2024-07-20
Attending: INTERNAL MEDICINE
Payer: COMMERCIAL

## 2024-07-20 ENCOUNTER — APPOINTMENT (OUTPATIENT)
Dept: LAB | Facility: CLINIC | Age: 64
End: 2024-07-20
Attending: INTERNAL MEDICINE
Payer: COMMERCIAL

## 2024-07-20 VITALS
BODY MASS INDEX: 32.22 KG/M2 | DIASTOLIC BLOOD PRESSURE: 52 MMHG | OXYGEN SATURATION: 98 % | TEMPERATURE: 98 F | SYSTOLIC BLOOD PRESSURE: 101 MMHG | WEIGHT: 231 LBS | HEART RATE: 98 BPM | RESPIRATION RATE: 18 BRPM

## 2024-07-20 DIAGNOSIS — C90.00 MULTIPLE MYELOMA, REMISSION STATUS UNSPECIFIED (H): ICD-10-CM

## 2024-07-20 DIAGNOSIS — C90.00 MULTIPLE MYELOMA, REMISSION STATUS UNSPECIFIED (H): Primary | ICD-10-CM

## 2024-07-20 LAB
ANION GAP SERPL CALCULATED.3IONS-SCNC: 11 MMOL/L (ref 7–15)
BASOPHILS # BLD AUTO: ABNORMAL 10*3/UL
BASOPHILS # BLD MANUAL: 0 10E3/UL (ref 0–0.2)
BASOPHILS NFR BLD AUTO: ABNORMAL %
BASOPHILS NFR BLD MANUAL: 0 %
BUN SERPL-MCNC: 24.6 MG/DL (ref 8–23)
CALCIUM SERPL-MCNC: 9.4 MG/DL (ref 8.8–10.4)
CHLORIDE SERPL-SCNC: 106 MMOL/L (ref 98–107)
CREAT SERPL-MCNC: 0.93 MG/DL (ref 0.67–1.17)
EGFRCR SERPLBLD CKD-EPI 2021: >90 ML/MIN/1.73M2
EOSINOPHIL # BLD AUTO: ABNORMAL 10*3/UL
EOSINOPHIL # BLD MANUAL: 0 10E3/UL (ref 0–0.7)
EOSINOPHIL NFR BLD AUTO: ABNORMAL %
EOSINOPHIL NFR BLD MANUAL: 0 %
ERYTHROCYTE [DISTWIDTH] IN BLOOD BY AUTOMATED COUNT: 13.2 % (ref 10–15)
GLUCOSE SERPL-MCNC: 176 MG/DL (ref 70–99)
HCO3 SERPL-SCNC: 22 MMOL/L (ref 22–29)
HCT VFR BLD AUTO: 38.5 % (ref 40–53)
HGB BLD-MCNC: 12.8 G/DL (ref 13.3–17.7)
IMM GRANULOCYTES # BLD: ABNORMAL 10*3/UL
IMM GRANULOCYTES NFR BLD: ABNORMAL %
LYMPHOCYTES # BLD AUTO: ABNORMAL 10*3/UL
LYMPHOCYTES # BLD MANUAL: 0.2 10E3/UL (ref 0.8–5.3)
LYMPHOCYTES NFR BLD AUTO: ABNORMAL %
LYMPHOCYTES NFR BLD MANUAL: 5 %
MCH RBC QN AUTO: 31.7 PG (ref 26.5–33)
MCHC RBC AUTO-ENTMCNC: 33.2 G/DL (ref 31.5–36.5)
MCV RBC AUTO: 95 FL (ref 78–100)
MONOCYTES # BLD AUTO: ABNORMAL 10*3/UL
MONOCYTES # BLD MANUAL: 0 10E3/UL (ref 0–1.3)
MONOCYTES NFR BLD AUTO: ABNORMAL %
MONOCYTES NFR BLD MANUAL: 0 %
NEUTROPHILS # BLD AUTO: ABNORMAL 10*3/UL
NEUTROPHILS # BLD MANUAL: 3.8 10E3/UL (ref 1.6–8.3)
NEUTROPHILS NFR BLD AUTO: ABNORMAL %
NEUTROPHILS NFR BLD MANUAL: 95 %
NRBC # BLD AUTO: 0 10E3/UL
NRBC BLD AUTO-RTO: 0 /100
PLAT MORPH BLD: ABNORMAL
PLATELET # BLD AUTO: 111 10E3/UL (ref 150–450)
POTASSIUM SERPL-SCNC: 4 MMOL/L (ref 3.4–5.3)
RBC # BLD AUTO: 4.04 10E6/UL (ref 4.4–5.9)
RBC MORPH BLD: ABNORMAL
SODIUM SERPL-SCNC: 139 MMOL/L (ref 135–145)
WBC # BLD AUTO: 4 10E3/UL (ref 4–11)

## 2024-07-20 PROCEDURE — 250N000011 HC RX IP 250 OP 636: Performed by: INTERNAL MEDICINE

## 2024-07-20 PROCEDURE — G0463 HOSPITAL OUTPT CLINIC VISIT: HCPCS | Mod: 25

## 2024-07-20 PROCEDURE — 96360 HYDRATION IV INFUSION INIT: CPT

## 2024-07-20 PROCEDURE — 85007 BL SMEAR W/DIFF WBC COUNT: CPT

## 2024-07-20 PROCEDURE — 36592 COLLECT BLOOD FROM PICC: CPT

## 2024-07-20 PROCEDURE — 99213 OFFICE O/P EST LOW 20 MIN: CPT | Performed by: STUDENT IN AN ORGANIZED HEALTH CARE EDUCATION/TRAINING PROGRAM

## 2024-07-20 PROCEDURE — 85027 COMPLETE CBC AUTOMATED: CPT

## 2024-07-20 PROCEDURE — 258N000003 HC RX IP 258 OP 636: Performed by: INTERNAL MEDICINE

## 2024-07-20 PROCEDURE — G2211 COMPLEX E/M VISIT ADD ON: HCPCS | Performed by: STUDENT IN AN ORGANIZED HEALTH CARE EDUCATION/TRAINING PROGRAM

## 2024-07-20 PROCEDURE — 80048 BASIC METABOLIC PNL TOTAL CA: CPT

## 2024-07-20 RX ORDER — HEPARIN SODIUM,PORCINE 10 UNIT/ML
5 VIAL (ML) INTRAVENOUS
Status: COMPLETED | OUTPATIENT
Start: 2024-07-20 | End: 2024-07-20

## 2024-07-20 RX ADMIN — SODIUM CHLORIDE 1000 ML: 9 INJECTION, SOLUTION INTRAVENOUS at 08:30

## 2024-07-20 RX ADMIN — Medication 5 ML: at 08:25

## 2024-07-20 RX ADMIN — Medication 5 ML: at 08:26

## 2024-07-20 ASSESSMENT — PAIN SCALES - GENERAL: PAINLEVEL: NO PAIN (0)

## 2024-07-20 NOTE — PROGRESS NOTES
Infusion Nursing Note:  Inocente Romero presents today for   Chief Complaint   Patient presents with    Infusion     Post bmt for MM here for IV hydration     .    Patient seen by provider today: Yes: Dr Chi   present during visit today: Not Applicable.    Note: pt received 1 liter NS IV today.      Intravenous Access:  Valderrama.    Treatment Conditions:  Results reviewed, labs MET treatment parameters, ok to proceed with treatment.      Post Infusion Assessment:  Patient tolerated infusion without incident.       Discharge Plan:   Patient and/or family verbalized understanding of discharge instructions and all questions answered.      Abbie Smyth RN

## 2024-07-20 NOTE — NURSING NOTE
"Oncology Rooming Note    July 20, 2024 8:22 AM   Inocente Romero is a 64 year old male who presents for:    Chief Complaint   Patient presents with    Oncology Clinic Visit     Post bmt for MM here for labs, md visit     Initial Vitals: /52   Pulse 98   Temp 98  F (36.7  C)   Resp 18   Wt 104.8 kg (231 lb)   SpO2 98%   BMI 32.22 kg/m   Estimated body mass index is 32.22 kg/m  as calculated from the following:    Height as of 7/8/24: 1.803 m (5' 11\").    Weight as of this encounter: 104.8 kg (231 lb). Body surface area is 2.29 meters squared.  No Pain (0) Comment: Data Unavailable   No LMP for male patient.  Allergies reviewed: Yes  Medications reviewed: Yes    Medications: Medication refills not needed today.  Pharmacy name entered into EPIC:    [facility] - [place], [place] - [address] [place] [address] PHARMACY - [place], [place] - [facility]    Frailty Screening:   Is the patient here for a new oncology consult visit in cancer care? 2. No      Clinical concerns: none   RN reviewed meds with patient.  He has no issues at this time    Abbie Smyth RN              "

## 2024-07-20 NOTE — LETTER
7/20/2024      Inocente Romero  [address]  [place] [address]      Dear Colleague,    Thank you for referring your patient, Inocente Romero, to the [facility] BLOOD AND MARROW TRANSPLANT PROGRAM [place]. Please see a copy of my visit note below.    BMT/Cell Therapy Daily Progress Note   07/21/2024    Patient ID:  Inocente Romero is a 64 year old male, hx of MM, currently undergoing stem cell collection. 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    INTERVAL  HISTORY     Having some naseau. No issues with Valderrama.     Review of Systems: 10 point ROS negative except as noted above.      PHYSICAL EXAM   /52   Pulse 98   Temp 98  F (36.7  C)   Resp 18   Wt 104.8 kg (231 lb)   SpO2 98%   BMI 32.22 kg/m      Weight In/Out     Wt Readings from Last 3 Encounters:   07/20/24 104.8 kg (231 lb)   07/19/24 105.9 kg (233 lb 8 oz)   07/17/24 106.8 kg (235 lb 6.4 oz)      [unfilled]     General: NAD   Eyes: sclera anicteric    Lungs: CTAB  Cardiovascular: RRR, no M/R/G  Lymphatics: no edema  Skin: no rashes or petechiae  Neuro: A&O   Additional Findings: clean and dry    LABS AND IMAGING: I have assessed all abnormal lab values for their clinical significance and any values considered clinically significant have been addressed in the assessment and plan.        Lab Results   Component Value Date    WBC 6.1 07/21/2024    ANEU 3.8 07/20/2024    HGB 12.5 (L) 07/21/2024    HCT 37.3 (L) 07/21/2024    PLT 96 (L) 07/21/2024     07/20/2024    POTASSIUM 4.0 07/20/2024    CHLORIDE 106 07/20/2024    CO2 22 07/20/2024     (H) 07/20/2024    BUN 24.6 (H) 07/20/2024    CR 0.93 07/20/2024    MAG 2.1 07/15/2024    INR 1.03 07/16/2024       SYSTEMS-BASED ASSESSMENT AND PLAN     BMT/IEC PROTOCOL for MM Auto PBSCT  Transplants for multiple myeloma:  The patient has Standard risk MM, and the collection goal is 8 million CD34/kg for 2 transplants.     Pt collected 9.84x10^06 CD34/kg   7/15 - D-1: 200mgm/2  Melphalan. Dose reviewed with Pharmacy. Reviewed possible side effects and what to do for them including fevers.   7/16: Transplant day 0. Thawed cell dose 4.92 x10^6 Cd34/kg. Infused cell dose pending.      HEME/COAG  - Transfusion parameters: hgb <7g/dL and plts <10,000.no transfusions needed today    ID  PPx: Levo, Fluc, ACV     GI  CINV ppx: Compazine, Zofran Zypreza PRN    CV:   - Continue Norvasc, lisinopril, metoprolol. Hold ASA (pt states hasn't been on for >1 mo). BP borderline today likely impacted by recent dex. No change to meds, will likely down trend    FEN/Renal:  - noted on KCL 10meq BID.   - Give 1LNS today      The longitudinal plan of care for the diagnosis(es)/condition(s) as documented were addressed during this visit. Due to the added complexity in care, I will continue to support Felix in the subsequent management and with ongoing continuity of care.    Outside of stem cell infusion time, I spent 20 minutes in the care of this patient today, which included time necessary for preparation for the visit, obtaining history, ordering medications/tests/procedures as medically indicated, review of pertinent medical literature, counseling of the patient, communication of recommendations to the care team, and documentation time.    Marga Chi MD

## 2024-07-21 ENCOUNTER — ONCOLOGY VISIT (OUTPATIENT)
Dept: TRANSPLANT | Facility: CLINIC | Age: 64
End: 2024-07-21
Attending: INTERNAL MEDICINE
Payer: COMMERCIAL

## 2024-07-21 ENCOUNTER — MYC REFILL (OUTPATIENT)
Dept: TRANSPLANT | Facility: CLINIC | Age: 64
End: 2024-07-21

## 2024-07-21 ENCOUNTER — APPOINTMENT (OUTPATIENT)
Dept: LAB | Facility: CLINIC | Age: 64
End: 2024-07-21
Attending: INTERNAL MEDICINE
Payer: COMMERCIAL

## 2024-07-21 DIAGNOSIS — C90.00 MULTIPLE MYELOMA, REMISSION STATUS UNSPECIFIED (H): ICD-10-CM

## 2024-07-21 DIAGNOSIS — I10 BENIGN ESSENTIAL HYPERTENSION: ICD-10-CM

## 2024-07-21 DIAGNOSIS — C90.00 MULTIPLE MYELOMA, REMISSION STATUS UNSPECIFIED (H): Primary | ICD-10-CM

## 2024-07-21 LAB
ANION GAP SERPL CALCULATED.3IONS-SCNC: 9 MMOL/L (ref 7–15)
BASOPHILS # BLD AUTO: ABNORMAL 10*3/UL
BASOPHILS # BLD MANUAL: 0 10E3/UL (ref 0–0.2)
BASOPHILS NFR BLD AUTO: ABNORMAL %
BASOPHILS NFR BLD MANUAL: 0 %
BUN SERPL-MCNC: 27.2 MG/DL (ref 8–23)
CALCIUM SERPL-MCNC: 8.7 MG/DL (ref 8.8–10.4)
CHLORIDE SERPL-SCNC: 108 MMOL/L (ref 98–107)
CREAT SERPL-MCNC: 0.94 MG/DL (ref 0.67–1.17)
EGFRCR SERPLBLD CKD-EPI 2021: >90 ML/MIN/1.73M2
EOSINOPHIL # BLD AUTO: ABNORMAL 10*3/UL
EOSINOPHIL # BLD MANUAL: 0 10E3/UL (ref 0–0.7)
EOSINOPHIL NFR BLD AUTO: ABNORMAL %
EOSINOPHIL NFR BLD MANUAL: 0 %
ERYTHROCYTE [DISTWIDTH] IN BLOOD BY AUTOMATED COUNT: 13.2 % (ref 10–15)
GLUCOSE SERPL-MCNC: 159 MG/DL (ref 70–99)
HCO3 SERPL-SCNC: 21 MMOL/L (ref 22–29)
HCT VFR BLD AUTO: 37.3 % (ref 40–53)
HGB BLD-MCNC: 12.5 G/DL (ref 13.3–17.7)
IMM GRANULOCYTES # BLD: ABNORMAL 10*3/UL
IMM GRANULOCYTES NFR BLD: ABNORMAL %
LYMPHOCYTES # BLD AUTO: ABNORMAL 10*3/UL
LYMPHOCYTES # BLD MANUAL: 0.1 10E3/UL (ref 0.8–5.3)
LYMPHOCYTES NFR BLD AUTO: ABNORMAL %
LYMPHOCYTES NFR BLD MANUAL: 1 %
MCH RBC QN AUTO: 31.8 PG (ref 26.5–33)
MCHC RBC AUTO-ENTMCNC: 33.5 G/DL (ref 31.5–36.5)
MCV RBC AUTO: 95 FL (ref 78–100)
MONOCYTES # BLD AUTO: ABNORMAL 10*3/UL
MONOCYTES # BLD MANUAL: 0 10E3/UL (ref 0–1.3)
MONOCYTES NFR BLD AUTO: ABNORMAL %
MONOCYTES NFR BLD MANUAL: 0 %
NEUTROPHILS # BLD AUTO: ABNORMAL 10*3/UL
NEUTROPHILS # BLD MANUAL: 6 10E3/UL (ref 1.6–8.3)
NEUTROPHILS NFR BLD AUTO: ABNORMAL %
NEUTROPHILS NFR BLD MANUAL: 99 %
NRBC # BLD AUTO: 0 10E3/UL
NRBC BLD AUTO-RTO: 0 /100
PLAT MORPH BLD: ABNORMAL
PLATELET # BLD AUTO: 96 10E3/UL (ref 150–450)
POTASSIUM SERPL-SCNC: 4.1 MMOL/L (ref 3.4–5.3)
RBC # BLD AUTO: 3.93 10E6/UL (ref 4.4–5.9)
RBC MORPH BLD: ABNORMAL
SODIUM SERPL-SCNC: 138 MMOL/L (ref 135–145)
WBC # BLD AUTO: 6.1 10E3/UL (ref 4–11)

## 2024-07-21 PROCEDURE — 96360 HYDRATION IV INFUSION INIT: CPT

## 2024-07-21 PROCEDURE — 36592 COLLECT BLOOD FROM PICC: CPT

## 2024-07-21 PROCEDURE — G2211 COMPLEX E/M VISIT ADD ON: HCPCS | Performed by: STUDENT IN AN ORGANIZED HEALTH CARE EDUCATION/TRAINING PROGRAM

## 2024-07-21 PROCEDURE — 85027 COMPLETE CBC AUTOMATED: CPT

## 2024-07-21 PROCEDURE — 99213 OFFICE O/P EST LOW 20 MIN: CPT | Performed by: STUDENT IN AN ORGANIZED HEALTH CARE EDUCATION/TRAINING PROGRAM

## 2024-07-21 PROCEDURE — 96372 THER/PROPH/DIAG INJ SC/IM: CPT

## 2024-07-21 PROCEDURE — 258N000003 HC RX IP 258 OP 636: Performed by: INTERNAL MEDICINE

## 2024-07-21 PROCEDURE — 85007 BL SMEAR W/DIFF WBC COUNT: CPT

## 2024-07-21 PROCEDURE — 80048 BASIC METABOLIC PNL TOTAL CA: CPT

## 2024-07-21 PROCEDURE — 250N000011 HC RX IP 250 OP 636

## 2024-07-21 RX ORDER — HEPARIN SODIUM,PORCINE 10 UNIT/ML
5-20 VIAL (ML) INTRAVENOUS DAILY PRN
Status: CANCELLED | OUTPATIENT
Start: 2024-07-22

## 2024-07-21 RX ORDER — HEPARIN SODIUM (PORCINE) LOCK FLUSH IV SOLN 100 UNIT/ML 100 UNIT/ML
5 SOLUTION INTRAVENOUS
Status: CANCELLED | OUTPATIENT
Start: 2024-07-22

## 2024-07-21 RX ADMIN — SODIUM CHLORIDE 1000 ML: 9 INJECTION, SOLUTION INTRAVENOUS at 08:38

## 2024-07-21 RX ADMIN — FILGRASTIM-SNDZ 480 MCG: 480 INJECTION, SOLUTION INTRAVENOUS; SUBCUTANEOUS at 08:34

## 2024-07-21 NOTE — PROGRESS NOTES
BMT/Cell Therapy Daily Progress Note   07/21/2024    Patient ID:  Inocente Romero is a 64 year old male, hx of MM, currently undergoing stem cell collection. Today is D+5 of his auto HSCT.    INTERVAL  HISTORY     Having some naseau. No issues with Valderrama.     Review of Systems: 10 point ROS negative except as noted above.      PHYSICAL EXAM   There were no vitals taken for this visit.    Weight In/Out     Wt Readings from Last 3 Encounters:   07/20/24 104.8 kg (231 lb)   07/19/24 105.9 kg (233 lb 8 oz)   07/17/24 106.8 kg (235 lb 6.4 oz)      [unfilled]     General: NAD   Eyes: sclera anicteric    Lungs: CTAB  Cardiovascular: RRR, no M/R/G  Lymphatics: no edema  Skin: no rashes or petechiae  Neuro: A&O   Additional Findings: clean and dry    LABS AND IMAGING: I have assessed all abnormal lab values for their clinical significance and any values considered clinically significant have been addressed in the assessment and plan.        Lab Results   Component Value Date    WBC 6.1 07/21/2024    ANEU 3.8 07/20/2024    HGB 12.5 (L) 07/21/2024    HCT 37.3 (L) 07/21/2024    PLT 96 (L) 07/21/2024     07/21/2024    POTASSIUM 4.1 07/21/2024    CHLORIDE 108 (H) 07/21/2024    CO2 21 (L) 07/21/2024     (H) 07/21/2024    BUN 27.2 (H) 07/21/2024    CR 0.94 07/21/2024    MAG 2.1 07/15/2024    INR 1.03 07/16/2024       SYSTEMS-BASED ASSESSMENT AND PLAN     BMT/IEC PROTOCOL for MM Auto PBSCT  Transplants for multiple myeloma:  The patient has Standard risk MM, and the collection goal is 8 million CD34/kg for 2 transplants.     Pt collected 9.84x10^06 CD34/kg   7/15 - D-1: 200mgm/2 Melphalan. Dose reviewed with Pharmacy. Reviewed possible side effects and what to do for them including fevers.   7/16: Transplant day 0. Thawed cell dose 4.92 x10^6 Cd34/kg. Infused cell dose pending.      HEME/COAG  - Transfusion parameters: hgb <7g/dL and plts <10,000.no transfusions needed today  - Start filgrastim  today    ID  PPx: Levo, Fluc, ACV     GI  CINV ppx: Compazine, Zofran Zypreza PRN  Recommended he start zofran for nausea when he gets home  Give 1L NS    CV:   - Continue Norvasc, lisinopril, metoprolol. Hold ASA (pt states hasn't been on for >1 mo). BP borderline today likely impacted by recent dex. No change to meds, will likely down trend    FEN/Renal:  - noted on KCL 10meq BID.         The longitudinal plan of care for the diagnosis(es)/condition(s) as documented were addressed during this visit. Due to the added complexity in care, I will continue to support Felix in the subsequent management and with ongoing continuity of care.    I spent 20 minutes in the care of this patient today, which included time necessary for preparation for the visit, obtaining history, ordering medications/tests/procedures as medically indicated, review of pertinent medical literature, counseling of the patient, communication of recommendations to the care team, and documentation time.    Marga Chi MD

## 2024-07-21 NOTE — NURSING NOTE
"Oncology Rooming Note    July 21, 2024 8:17 AM   Inocente Romero is a 64 year old male who presents for:    No chief complaint on file.    Initial Vitals: There were no vitals taken for this visit. Estimated body mass index is 32.22 kg/m  as calculated from the following:    Height as of 7/8/24: 1.803 m (5' 11\").    Weight as of 7/20/24: 104.8 kg (231 lb). There is no height or weight on file to calculate BSA.  Data Unavailable Comment: Data Unavailable   No LMP for male patient.  Allergies reviewed: Yes  Medications reviewed: Yes    Medications: Medication refills not needed today.  Pharmacy name entered into EPIC:    [facility] - [place], [place] - [address] [place] [address] PHARMACY - [place], [place] - [address] [facility]    Frailty Screening:   Is the patient here for a new oncology consult visit in cancer care? 2. No      Clinical concerns: no       Luz Leger RN              "

## 2024-07-21 NOTE — PROGRESS NOTES
BMT/Cell Therapy Daily Progress Note   07/21/2024    Patient ID:  Inocente Romero is a 64 year old male, hx of MM, currently undergoing stem cell collection. 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    INTERVAL  HISTORY     Having some naseau. No issues with Valderrama.     Review of Systems: 10 point ROS negative except as noted above.      PHYSICAL EXAM   /52   Pulse 98   Temp 98  F (36.7  C)   Resp 18   Wt 104.8 kg (231 lb)   SpO2 98%   BMI 32.22 kg/m      Weight In/Out     Wt Readings from Last 3 Encounters:   07/20/24 104.8 kg (231 lb)   07/19/24 105.9 kg (233 lb 8 oz)   07/17/24 106.8 kg (235 lb 6.4 oz)      [unfilled]     General: NAD   Eyes: sclera anicteric    Lungs: CTAB  Cardiovascular: RRR, no M/R/G  Lymphatics: no edema  Skin: no rashes or petechiae  Neuro: A&O   Additional Findings: clean and dry    LABS AND IMAGING: I have assessed all abnormal lab values for their clinical significance and any values considered clinically significant have been addressed in the assessment and plan.        Lab Results   Component Value Date    WBC 6.1 07/21/2024    ANEU 3.8 07/20/2024    HGB 12.5 (L) 07/21/2024    HCT 37.3 (L) 07/21/2024    PLT 96 (L) 07/21/2024     07/20/2024    POTASSIUM 4.0 07/20/2024    CHLORIDE 106 07/20/2024    CO2 22 07/20/2024     (H) 07/20/2024    BUN 24.6 (H) 07/20/2024    CR 0.93 07/20/2024    MAG 2.1 07/15/2024    INR 1.03 07/16/2024       SYSTEMS-BASED ASSESSMENT AND PLAN     BMT/IEC PROTOCOL for MM Auto PBSCT  Transplants for multiple myeloma:  The patient has Standard risk MM, and the collection goal is 8 million CD34/kg for 2 transplants.     Pt collected 9.84x10^06 CD34/kg   7/15 - D-1: 200mgm/2 Melphalan. Dose reviewed with Pharmacy. Reviewed possible side effects and what to do for them including fevers.   7/16: Transplant day 0. Thawed cell dose 4.92 x10^6 Cd34/kg. Infused cell dose pending.      HEME/COAG  - Transfusion parameters: hgb <7g/dL and plts  <10,000.no transfusions needed today    ID  PPx: Levo, Fluc, ACV     GI  CINV ppx: Compazine, Zofran Zypreza PRN    CV:   - Continue Norvasc, lisinopril, metoprolol. Hold ASA (pt states hasn't been on for >1 mo). BP borderline today likely impacted by recent dex. No change to meds, will likely down trend    FEN/Renal:  - noted on KCL 10meq BID.   - Give 1LNS today      The longitudinal plan of care for the diagnosis(es)/condition(s) as documented were addressed during this visit. Due to the added complexity in care, I will continue to support Felix in the subsequent management and with ongoing continuity of care.    , I spent 20 minutes in the care of this patient today, which included time necessary for preparation for the visit, obtaining history, ordering medications/tests/procedures as medically indicated, review of pertinent medical literature, counseling of the patient, communication of recommendations to the care team, and documentation time.    Marga Chi MD

## 2024-07-21 NOTE — LETTER
7/21/2024      Inocente Romero  [address]  [place] [address]      Dear Colleague,    Thank you for referring your patient, Inocente Romero, to the [facility] BLOOD AND MARROW TRANSPLANT PROGRAM [place]. Please see a copy of my visit note below.    BMT/Cell Therapy Daily Progress Note   07/21/2024    Patient ID:  Inocente Romero is a 64 year old male, hx of MM, currently undergoing stem cell collection. Today is D+5 of his auto HSCT.    INTERVAL  HISTORY     Having some naseau. No issues with Valderrama.     Review of Systems: 10 point ROS negative except as noted above.      PHYSICAL EXAM   There were no vitals taken for this visit.    Weight In/Out     Wt Readings from Last 3 Encounters:   07/20/24 104.8 kg (231 lb)   07/19/24 105.9 kg (233 lb 8 oz)   07/17/24 106.8 kg (235 lb 6.4 oz)      [unfilled]     General: NAD   Eyes: sclera anicteric    Lungs: CTAB  Cardiovascular: RRR, no M/R/G  Lymphatics: no edema  Skin: no rashes or petechiae  Neuro: A&O   Additional Findings: clean and dry    LABS AND IMAGING: I have assessed all abnormal lab values for their clinical significance and any values considered clinically significant have been addressed in the assessment and plan.        Lab Results   Component Value Date    WBC 6.1 07/21/2024    ANEU 3.8 07/20/2024    HGB 12.5 (L) 07/21/2024    HCT 37.3 (L) 07/21/2024    PLT 96 (L) 07/21/2024     07/21/2024    POTASSIUM 4.1 07/21/2024    CHLORIDE 108 (H) 07/21/2024    CO2 21 (L) 07/21/2024     (H) 07/21/2024    BUN 27.2 (H) 07/21/2024    CR 0.94 07/21/2024    MAG 2.1 07/15/2024    INR 1.03 07/16/2024       SYSTEMS-BASED ASSESSMENT AND PLAN     BMT/IEC PROTOCOL for MM Auto PBSCT  Transplants for multiple myeloma:  The patient has Standard risk MM, and the collection goal is 8 million CD34/kg for 2 transplants.     Pt collected 9.84x10^06 CD34/kg   7/15 - D-1: 200mgm/2 Melphalan. Dose reviewed with Pharmacy. Reviewed possible side  effects and what to do for them including fevers.   7/16: Transplant day 0. Thawed cell dose 4.92 x10^6 Cd34/kg. Infused cell dose pending.      HEME/COAG  - Transfusion parameters: hgb <7g/dL and plts <10,000.no transfusions needed today  - Start filgrastim today    ID  PPx: Levo, Fluc, ACV     GI  CINV ppx: Compazine, Zofran Zypreza PRN  Recommended he start zofran for nausea when he gets home  Give 1L NS    CV:   - Continue Norvasc, lisinopril, metoprolol. Hold ASA (pt states hasn't been on for >1 mo). BP borderline today likely impacted by recent dex. No change to meds, will likely down trend    FEN/Renal:  - noted on KCL 10meq BID.         The longitudinal plan of care for the diagnosis(es)/condition(s) as documented were addressed during this visit. Due to the added complexity in care, I will continue to support Felix in the subsequent management and with ongoing continuity of care.    I spent 20 minutes in the care of this patient today, which included time necessary for preparation for the visit, obtaining history, ordering medications/tests/procedures as medically indicated, review of pertinent medical literature, counseling of the patient, communication of recommendations to the care team, and documentation time.    Marga Chi MD

## 2024-07-21 NOTE — PROGRESS NOTES
Infusion Nursing Note:  Inocente Romero presents today for IVF and growth factor.    Patient seen by provider today: Yes: Dr. Chi   present during visit today: Not Applicable.    Note: Lab results monitored. Allergies and home medications reviewed.   Pt received 1L NS and growth factor injection, tolerated well. Pt discharged with no further concerns.       Intravenous Access:  Valderrama.    Treatment Conditions:  Lab Results   Component Value Date    HGB 12.5 (L) 07/21/2024    WBC 6.1 07/21/2024    ANEU 6.0 07/21/2024    ANEUTAUTO 13.7 (H) 07/17/2024    PLT 96 (L) 07/21/2024        Lab Results   Component Value Date     07/21/2024    POTASSIUM 4.1 07/21/2024    MAG 2.1 07/15/2024    CR 0.94 07/21/2024    REY 8.7 (L) 07/21/2024    BILITOTAL 0.3 07/15/2024    ALBUMIN 4.5 07/15/2024    ALT 18 07/15/2024    AST 20 07/15/2024       Results reviewed, labs MET treatment parameters, ok to proceed with treatment.      Post Infusion Assessment:  Patient tolerated infusion without incident.  Blood return noted pre and post infusion.       Discharge Plan:   Patient and/or family verbalized understanding of discharge instructions and all questions answered.  Patient discharged in stable condition accompanied by: wife.      Luz Leger RN

## 2024-07-22 ENCOUNTER — ONCOLOGY VISIT (OUTPATIENT)
Dept: TRANSPLANT | Facility: CLINIC | Age: 64
End: 2024-07-22
Attending: INTERNAL MEDICINE
Payer: COMMERCIAL

## 2024-07-22 ENCOUNTER — APPOINTMENT (OUTPATIENT)
Dept: LAB | Facility: CLINIC | Age: 64
End: 2024-07-22
Attending: INTERNAL MEDICINE
Payer: COMMERCIAL

## 2024-07-22 VITALS
RESPIRATION RATE: 20 BRPM | BODY MASS INDEX: 32.44 KG/M2 | TEMPERATURE: 97.4 F | DIASTOLIC BLOOD PRESSURE: 75 MMHG | HEART RATE: 94 BPM | OXYGEN SATURATION: 97 % | WEIGHT: 232.6 LBS | SYSTOLIC BLOOD PRESSURE: 127 MMHG

## 2024-07-22 DIAGNOSIS — C90.00 MULTIPLE MYELOMA, REMISSION STATUS UNSPECIFIED (H): Primary | ICD-10-CM

## 2024-07-22 PROCEDURE — 99214 OFFICE O/P EST MOD 30 MIN: CPT

## 2024-07-22 PROCEDURE — G0463 HOSPITAL OUTPT CLINIC VISIT: HCPCS

## 2024-07-22 PROCEDURE — 250N000011 HC RX IP 250 OP 636

## 2024-07-22 PROCEDURE — 96372 THER/PROPH/DIAG INJ SC/IM: CPT

## 2024-07-22 PROCEDURE — 250N000011 HC RX IP 250 OP 636: Performed by: INTERNAL MEDICINE

## 2024-07-22 RX ORDER — HEPARIN SODIUM (PORCINE) LOCK FLUSH IV SOLN 100 UNIT/ML 100 UNIT/ML
5 SOLUTION INTRAVENOUS
Status: CANCELLED | OUTPATIENT
Start: 2024-07-23

## 2024-07-22 RX ORDER — HEPARIN SODIUM,PORCINE 10 UNIT/ML
5-20 VIAL (ML) INTRAVENOUS DAILY PRN
Status: DISCONTINUED | OUTPATIENT
Start: 2024-07-22 | End: 2024-07-22 | Stop reason: HOSPADM

## 2024-07-22 RX ORDER — HEPARIN SODIUM,PORCINE 10 UNIT/ML
5-20 VIAL (ML) INTRAVENOUS DAILY PRN
Status: CANCELLED | OUTPATIENT
Start: 2024-07-23

## 2024-07-22 RX ADMIN — Medication 10 ML: at 12:30

## 2024-07-22 RX ADMIN — FILGRASTIM-SNDZ 480 MCG: 480 INJECTION, SOLUTION INTRAVENOUS; SUBCUTANEOUS at 13:41

## 2024-07-22 ASSESSMENT — PAIN SCALES - GENERAL: PAINLEVEL: NO PAIN (0)

## 2024-07-22 NOTE — LETTER
7/22/2024      Inocente Romero  [address] [place] [address]      Dear Colleague,    Thank you for referring your patient, Inocente Romero, to the [facility] BLOOD AND MARROW TRANSPLANT PROGRAM [place]. Please see a copy of my visit note below.    BMT/Cell Therapy Daily Progress Note   07/22/2024    Patient ID:  Inocente Romero is a 64 year old male, hx of MM, currently undergoing stem cell collection. Today is D+6 of his auto HSCT.    INTERVAL  HISTORY     Overall doing okay.  Drinking gatorade as well now.  Some softer stools but not overt diarrhea. No belly pain.  No fevers.  No bleeding   No new issues.     Review of Systems: 10 point ROS negative except as noted above.      PHYSICAL EXAM   /75 (BP Location: Right arm)   Pulse 94   Temp 97.4  F (36.3  C) (Oral)   Resp 20   Wt 105.5 kg (232 lb 9.6 oz)   SpO2 97%   BMI 32.44 kg/m      Weight In/Out     Wt Readings from Last 3 Encounters:   07/22/24 105.5 kg (232 lb 9.6 oz)   07/20/24 104.8 kg (231 lb)   07/19/24 105.9 kg (233 lb 8 oz)      [unfilled]     General: NAD   Eyes: sclera anicteric    Lungs: CTAB  Cardiovascular: RRR, no M/R/G  Lymphatics: no edema  Skin: no rashes or petechiae  Neuro: A&O   Additional Findings: clean and dry    LABS AND IMAGING: I have assessed all abnormal lab values for their clinical significance and any values considered clinically significant have been addressed in the assessment and plan.        Lab Results   Component Value Date    WBC 6.1 07/21/2024    ANEU 6.0 07/21/2024    HGB 12.5 (L) 07/21/2024    HCT 37.3 (L) 07/21/2024    PLT 96 (L) 07/21/2024     07/21/2024    POTASSIUM 4.1 07/21/2024    CHLORIDE 108 (H) 07/21/2024    CO2 21 (L) 07/21/2024     (H) 07/21/2024    BUN 27.2 (H) 07/21/2024    CR 0.94 07/21/2024    MAG 2.1 07/15/2024    INR 1.03 07/16/2024       SYSTEMS-BASED ASSESSMENT AND PLAN     BMT/IEC PROTOCOL for MM Auto PBSCT  Transplants for multiple myeloma:   The patient has Standard risk MM, and the collection goal is 8 million CD34/kg for 2 transplants.     Pt collected 9.84x10^06 CD34/kg   7/15 - D-1: 200mgm/2 Melphalan. Dose reviewed with Pharmacy. Reviewed possible side effects and what to do for them including fevers.   7/16: Transplant day 0. Thawed cell dose 4.92 x10^6 Cd34/kg. Infused cell dose pending.      HEME/COAG  - Transfusion parameters: hgb <7g/dL and plts <10,000. no transfusions needed today  - Now on daily GCSF    ID  PPx: Levo, Fluc, ACV     GI  CINV ppx: Compazine, Zofran Zypreza PRN    CV:   - Continue Norvasc, lisinopril, metoprolol. Watching BP closely.    FEN/Renal:  - noted on KCL 10meq BID.     RTC: daily    I spent 30 minutes in the care of this patient today, which included time necessary for preparation for the visit, obtaining history, ordering medications/tests/procedures as medically indicated, review of pertinent medical literature, counseling of the patient, communication of recommendations to the care team, and documentation time.    Gabriela Magaña PA-C

## 2024-07-22 NOTE — NURSING NOTE
"Oncology Rooming Note    July 22, 2024 12:18 PM   Inocente Romero is a 64 year old male who presents for:    No chief complaint on file.    Initial Vitals: /75 (BP Location: Right arm)   Pulse 94   Temp 97.4  F (36.3  C) (Oral)   Resp 20   Wt 105.5 kg (232 lb 9.6 oz)   SpO2 97%   BMI 32.44 kg/m   Estimated body mass index is 32.44 kg/m  as calculated from the following:    Height as of 7/8/24: 1.803 m (5' 11\").    Weight as of this encounter: 105.5 kg (232 lb 9.6 oz). Body surface area is 2.3 meters squared.  No Pain (0) Comment: Data Unavailable   No LMP for male patient.  Allergies reviewed: Yes  Medications reviewed: Yes    Medications: Medication refills not needed today.  Pharmacy name entered into EPIC:    [facility] - [place], [place] - [address] [facility] - [place], [place] - [facility]    Frailty Screening:   Is the patient here for a new oncology consult visit in cancer care? 2. No      Clinical concerns: none      Noemí Brown RN             "

## 2024-07-22 NOTE — PROGRESS NOTES
BMT/Cell Therapy Daily Progress Note   07/22/2024    Patient ID:  Inocente Romero is a 64 year old male, hx of MM, currently undergoing stem cell collection. Today is D+6 of his auto HSCT.    INTERVAL  HISTORY     Overall doing okay.  Drinking gatorade as well now.  Some softer stools but not overt diarrhea. No belly pain.  No fevers.  No bleeding   No new issues.     Review of Systems: 10 point ROS negative except as noted above.      PHYSICAL EXAM   /75 (BP Location: Right arm)   Pulse 94   Temp 97.4  F (36.3  C) (Oral)   Resp 20   Wt 105.5 kg (232 lb 9.6 oz)   SpO2 97%   BMI 32.44 kg/m      Weight In/Out     Wt Readings from Last 3 Encounters:   07/22/24 105.5 kg (232 lb 9.6 oz)   07/20/24 104.8 kg (231 lb)   07/19/24 105.9 kg (233 lb 8 oz)      [unfilled]     General: NAD   Eyes: sclera anicteric    Lungs: CTAB  Cardiovascular: RRR, no M/R/G  Lymphatics: no edema  Skin: no rashes or petechiae  Neuro: A&O   Additional Findings: clean and dry    LABS AND IMAGING: I have assessed all abnormal lab values for their clinical significance and any values considered clinically significant have been addressed in the assessment and plan.        Lab Results   Component Value Date    WBC 6.1 07/21/2024    ANEU 6.0 07/21/2024    HGB 12.5 (L) 07/21/2024    HCT 37.3 (L) 07/21/2024    PLT 96 (L) 07/21/2024     07/21/2024    POTASSIUM 4.1 07/21/2024    CHLORIDE 108 (H) 07/21/2024    CO2 21 (L) 07/21/2024     (H) 07/21/2024    BUN 27.2 (H) 07/21/2024    CR 0.94 07/21/2024    MAG 2.1 07/15/2024    INR 1.03 07/16/2024       SYSTEMS-BASED ASSESSMENT AND PLAN     BMT/IEC PROTOCOL for MM Auto PBSCT  Transplants for multiple myeloma:  The patient has Standard risk MM, and the collection goal is 8 million CD34/kg for 2 transplants.     Pt collected 9.84x10^06 CD34/kg   7/15 - D-1: 200mgm/2 Melphalan. Dose reviewed with Pharmacy. Reviewed possible side effects and what to do for them including fevers.    7/16: Transplant day 0. Thawed cell dose 4.92 x10^6 Cd34/kg. Infused cell dose pending.      HEME/COAG  - Transfusion parameters: hgb <7g/dL and plts <10,000. no transfusions needed today  - Now on daily GCSF    ID  PPx: Levo, Fluc, ACV     GI  CINV ppx: Compazine, Zofran Zypreza PRN    CV:   - Continue Norvasc, lisinopril, metoprolol. Watching BP closely.    FEN/Renal:  - noted on KCL 10meq BID.     RTC: daily    I spent 30 minutes in the care of this patient today, which included time necessary for preparation for the visit, obtaining history, ordering medications/tests/procedures as medically indicated, review of pertinent medical literature, counseling of the patient, communication of recommendations to the care team, and documentation time.    Gabriela Magaña PA-C

## 2024-07-22 NOTE — NURSING NOTE
Patient arrived to clinic for Zarxio mcg injection today.  Injection given to abdominal tissue, right side without incident and patient tolerated procedure well.  Patient is aware of appointment tomorrow.    Sushila Mancera CMA (AAMA)

## 2024-07-23 ENCOUNTER — APPOINTMENT (OUTPATIENT)
Dept: LAB | Facility: CLINIC | Age: 64
End: 2024-07-23
Attending: INTERNAL MEDICINE
Payer: COMMERCIAL

## 2024-07-23 ENCOUNTER — ONCOLOGY VISIT (OUTPATIENT)
Dept: TRANSPLANT | Facility: CLINIC | Age: 64
End: 2024-07-23
Attending: INTERNAL MEDICINE
Payer: COMMERCIAL

## 2024-07-23 VITALS
OXYGEN SATURATION: 98 % | WEIGHT: 231.6 LBS | DIASTOLIC BLOOD PRESSURE: 75 MMHG | TEMPERATURE: 98.1 F | SYSTOLIC BLOOD PRESSURE: 125 MMHG | RESPIRATION RATE: 16 BRPM | BODY MASS INDEX: 32.3 KG/M2 | HEART RATE: 92 BPM

## 2024-07-23 DIAGNOSIS — C90.00 MULTIPLE MYELOMA, REMISSION STATUS UNSPECIFIED (H): Primary | ICD-10-CM

## 2024-07-23 PROCEDURE — G2211 COMPLEX E/M VISIT ADD ON: HCPCS

## 2024-07-23 PROCEDURE — 99214 OFFICE O/P EST MOD 30 MIN: CPT

## 2024-07-23 PROCEDURE — G0463 HOSPITAL OUTPT CLINIC VISIT: HCPCS

## 2024-07-23 PROCEDURE — 250N000011 HC RX IP 250 OP 636

## 2024-07-23 PROCEDURE — 250N000011 HC RX IP 250 OP 636: Performed by: INTERNAL MEDICINE

## 2024-07-23 PROCEDURE — 96372 THER/PROPH/DIAG INJ SC/IM: CPT

## 2024-07-23 RX ORDER — HEPARIN SODIUM (PORCINE) LOCK FLUSH IV SOLN 100 UNIT/ML 100 UNIT/ML
5 SOLUTION INTRAVENOUS
Status: CANCELLED | OUTPATIENT
Start: 2024-07-24

## 2024-07-23 RX ORDER — HEPARIN SODIUM,PORCINE 10 UNIT/ML
5 VIAL (ML) INTRAVENOUS ONCE
Status: COMPLETED | OUTPATIENT
Start: 2024-07-23 | End: 2024-07-23

## 2024-07-23 RX ORDER — HEPARIN SODIUM,PORCINE 10 UNIT/ML
5-20 VIAL (ML) INTRAVENOUS DAILY PRN
Status: CANCELLED | OUTPATIENT
Start: 2024-07-24

## 2024-07-23 RX ADMIN — Medication 5 ML: at 07:03

## 2024-07-23 RX ADMIN — FILGRASTIM-SNDZ 480 MCG: 480 INJECTION, SOLUTION INTRAVENOUS; SUBCUTANEOUS at 08:08

## 2024-07-23 ASSESSMENT — PAIN SCALES - GENERAL: PAINLEVEL: NO PAIN (0)

## 2024-07-23 NOTE — PROGRESS NOTES
"BMT/Cell Therapy Daily Progress Note   07/23/2024    Patient ID:  Inocente Romero is a 64 year old male, hx of MM, currently undergoing stem cell collection. Today is D+6 of his auto HSCT.    INTERVAL  HISTORY     Felix continues to do well. He has no new complaints today. Drinking fluids, eating as much as he can. His stomach is \"twirly\" but no overt nausea. No belly pain. Stools are loose, 2-3x/day. No fevers or bleeding.     Review of Systems: 10 point ROS negative except as noted above.      PHYSICAL EXAM   /75 (BP Location: Right arm, Patient Position: Sitting, Cuff Size: Adult Large)   Pulse 92   Temp 98.1  F (36.7  C) (Oral)   Resp 16   Wt 105.1 kg (231 lb 9.6 oz)   SpO2 98%   BMI 32.30 kg/m      Weight In/Out     Wt Readings from Last 3 Encounters:   07/23/24 105.1 kg (231 lb 9.6 oz)   07/22/24 105.5 kg (232 lb 9.6 oz)   07/20/24 104.8 kg (231 lb)      [unfilled]     General: NAD   Eyes: sclera anicteric    Lungs: CTAB  Cardiovascular: RRR, no M/R/G  Lymphatics: no edema  Skin: no rashes or petechiae  Neuro: A&O   Additional Findings: clean and dry    LABS AND IMAGING: I have assessed all abnormal lab values for their clinical significance and any values considered clinically significant have been addressed in the assessment and plan.        Lab Results   Component Value Date    WBC 1.3 (L) 07/22/2024    ANEU 6.0 07/21/2024    HGB 11.8 (L) 07/22/2024    HCT 34.7 (L) 07/22/2024    PLT 50 (L) 07/22/2024     07/22/2024    POTASSIUM 4.2 07/22/2024    CHLORIDE 108 (H) 07/22/2024    CO2 21 (L) 07/22/2024     (H) 07/22/2024    BUN 20.7 07/22/2024    CR 0.82 07/22/2024    MAG 2.1 07/15/2024    INR 1.03 07/16/2024       SYSTEMS-BASED ASSESSMENT AND PLAN     BMT/IEC PROTOCOL for MM Auto PBSCT  Transplants for multiple myeloma:  The patient has Standard risk MM, and the collection goal is 8 million CD34/kg for 2 transplants.     Pt collected 9.84x10^06 CD34/kg   7/15 - D-1: 200mgm/2 " Melphalan. Dose reviewed with Pharmacy. Reviewed possible side effects and what to do for them including fevers.   7/16: Transplant day 0. Thawed cell dose 4.92 x10^6 Cd34/kg. Infused cell dose 3.77x10^6 CD34/kg.      HEME/COAG  - Transfusion parameters: hgb <7g/dL and plts <10,000. no transfusions needed today  - Now on daily GCSF    ID  PPx: Levo, Fluc, ACV     GI  CINV ppx: Compazine, Zofran Zypreza PRN    CV:   - Continue Norvasc, lisinopril, metoprolol. Watching BP closely.    FEN/Renal:  - noted on KCL 10meq BID.     Summary:  - GCSF  - Reinforced neutropenic and thrombocytopenic precautions.    RTC: daily    I spent 30 minutes in the care of this patient today, which included time necessary for preparation for the visit, obtaining history, ordering medications/tests/procedures as medically indicated, review of pertinent medical literature, counseling of the patient, communication of recommendations to the care team, and documentation time.    The longitudinal plan of care for the diagnosis(es)/condition(s) as documented were addressed during this visit. Due to the added complexity in care, I will continue to support Felix in the subsequent management and with ongoing continuity of care.    Nguyen Sharp NP

## 2024-07-23 NOTE — NURSING NOTE
Chief Complaint   Patient presents with    Blood Draw     Labs drawn via cvc by RN in lab. VS taken.      Labs drawn via CVC by RN. Flushed with saline and heparin. Vitals taken. Pt checked into next appt.     Leticia Caceres RN

## 2024-07-23 NOTE — NURSING NOTE
"Oncology Rooming Note    July 23, 2024 7:21 AM   Inocente Romero is a 64 year old male who presents for:    Chief Complaint   Patient presents with    Blood Draw     Labs drawn via cvc by RN in lab. VS taken.     Oncology Clinic Visit     Multiple Myeloma     Initial Vitals: /75 (BP Location: Right arm, Patient Position: Sitting, Cuff Size: Adult Large)   Pulse 92   Temp 98.1  F (36.7  C) (Oral)   Resp 16   Wt 105.1 kg (231 lb 9.6 oz)   SpO2 98%   BMI 32.30 kg/m   Estimated body mass index is 32.3 kg/m  as calculated from the following:    Height as of 7/8/24: 1.803 m (5' 11\").    Weight as of this encounter: 105.1 kg (231 lb 9.6 oz). Body surface area is 2.29 meters squared.  No Pain (0) Comment: Data Unavailable   No LMP for male patient.  Allergies reviewed: Yes  Medications reviewed: Yes    Medications: Medication refills not needed today.  Pharmacy name entered into EPIC: [facility] - [place], [place] - [address]    Frailty Screening:   Is the patient here for a new oncology consult visit in cancer care? 2. No      Clinical concerns: None       Kristina Vera LPN  7/23/2024              "

## 2024-07-23 NOTE — LETTER
"7/23/2024      Inocente Romero  [address] [address]      Dear Colleague,    Thank you for referring your patient, Inocente Romero, to the [facility] BLOOD AND MARROW TRANSPLANT PROGRAM [place]. Please see a copy of my visit note below.    BMT/Cell Therapy Daily Progress Note   07/23/2024    Patient ID:  Inocente Romero is a 64 year old male, hx of MM, currently undergoing stem cell collection. Today is D+6 of his auto HSCT.    INTERVAL  HISTORY     Felix continues to do well. He has no new complaints today. Drinking fluids, eating as much as he can. His stomach is \"twirly\" but no overt nausea. No belly pain. Stools are loose, 2-3x/day. No fevers or bleeding.     Review of Systems: 10 point ROS negative except as noted above.      PHYSICAL EXAM   /75 (BP Location: Right arm, Patient Position: Sitting, Cuff Size: Adult Large)   Pulse 92   Temp 98.1  F (36.7  C) (Oral)   Resp 16   Wt 105.1 kg (231 lb 9.6 oz)   SpO2 98%   BMI 32.30 kg/m      Weight In/Out     Wt Readings from Last 3 Encounters:   07/23/24 105.1 kg (231 lb 9.6 oz)   07/22/24 105.5 kg (232 lb 9.6 oz)   07/20/24 104.8 kg (231 lb)      [unfilled]     General: NAD   Eyes: sclera anicteric    Lungs: CTAB  Cardiovascular: RRR, no M/R/G  Lymphatics: no edema  Skin: no rashes or petechiae  Neuro: A&O   Additional Findings: clean and dry    LABS AND IMAGING: I have assessed all abnormal lab values for their clinical significance and any values considered clinically significant have been addressed in the assessment and plan.        Lab Results   Component Value Date    WBC 1.3 (L) 07/22/2024    ANEU 6.0 07/21/2024    HGB 11.8 (L) 07/22/2024    HCT 34.7 (L) 07/22/2024    PLT 50 (L) 07/22/2024     07/22/2024    POTASSIUM 4.2 07/22/2024    CHLORIDE 108 (H) 07/22/2024    CO2 21 (L) 07/22/2024     (H) 07/22/2024    BUN 20.7 07/22/2024    CR 0.82 07/22/2024    MAG 2.1 07/15/2024    INR 1.03 07/16/2024 "       SYSTEMS-BASED ASSESSMENT AND PLAN     BMT/IEC PROTOCOL for MM Auto PBSCT  Transplants for multiple myeloma:  The patient has Standard risk MM, and the collection goal is 8 million CD34/kg for 2 transplants.     Pt collected 9.84x10^06 CD34/kg   7/15 - D-1: 200mgm/2 Melphalan. Dose reviewed with Pharmacy. Reviewed possible side effects and what to do for them including fevers.   7/16: Transplant day 0. Thawed cell dose 4.92 x10^6 Cd34/kg. Infused cell dose 3.77x10^6 CD34/kg.      HEME/COAG  - Transfusion parameters: hgb <7g/dL and plts <10,000. no transfusions needed today  - Now on daily GCSF    ID  PPx: Levo, Fluc, ACV     GI  CINV ppx: Compazine, Zofran Zypreza PRN    CV:   - Continue Norvasc, lisinopril, metoprolol. Watching BP closely.    FEN/Renal:  - noted on KCL 10meq BID.     Summary:  - GCSF  - Reinforced neutropenic and thrombocytopenic precautions.    RTC: daily    I spent 30 minutes in the care of this patient today, which included time necessary for preparation for the visit, obtaining history, ordering medications/tests/procedures as medically indicated, review of pertinent medical literature, counseling of the patient, communication of recommendations to the care team, and documentation time.    The longitudinal plan of care for the diagnosis(es)/condition(s) as documented were addressed during this visit. Due to the added complexity in care, I will continue to support Felix in the subsequent management and with ongoing continuity of care.    Nguyen Sharp NP

## 2024-07-24 ENCOUNTER — ONCOLOGY VISIT (OUTPATIENT)
Dept: TRANSPLANT | Facility: CLINIC | Age: 64
End: 2024-07-24
Attending: INTERNAL MEDICINE
Payer: COMMERCIAL

## 2024-07-24 VITALS
HEART RATE: 93 BPM | TEMPERATURE: 98.5 F | SYSTOLIC BLOOD PRESSURE: 130 MMHG | OXYGEN SATURATION: 98 % | RESPIRATION RATE: 16 BRPM | DIASTOLIC BLOOD PRESSURE: 78 MMHG | BODY MASS INDEX: 32.3 KG/M2 | WEIGHT: 231.6 LBS

## 2024-07-24 DIAGNOSIS — C90.00 MULTIPLE MYELOMA, REMISSION STATUS UNSPECIFIED (H): Primary | ICD-10-CM

## 2024-07-24 LAB
BLD PROD TYP BPU: NORMAL
BLOOD COMPONENT TYPE: NORMAL
CODING SYSTEM: NORMAL
ISSUE DATE AND TIME: NORMAL
UNIT ABO/RH: NORMAL
UNIT NUMBER: NORMAL
UNIT STATUS: NORMAL
UNIT TYPE ISBT: 7300

## 2024-07-24 PROCEDURE — 96372 THER/PROPH/DIAG INJ SC/IM: CPT

## 2024-07-24 PROCEDURE — G2211 COMPLEX E/M VISIT ADD ON: HCPCS

## 2024-07-24 PROCEDURE — 250N000011 HC RX IP 250 OP 636

## 2024-07-24 PROCEDURE — 250N000011 HC RX IP 250 OP 636: Performed by: INTERNAL MEDICINE

## 2024-07-24 PROCEDURE — G0463 HOSPITAL OUTPT CLINIC VISIT: HCPCS

## 2024-07-24 PROCEDURE — 99213 OFFICE O/P EST LOW 20 MIN: CPT

## 2024-07-24 RX ORDER — AMLODIPINE BESYLATE 10 MG/1
10 TABLET ORAL DAILY
Qty: 30 TABLET | Refills: 11 | Status: SHIPPED | OUTPATIENT
Start: 2024-07-24

## 2024-07-24 RX ORDER — HEPARIN SODIUM,PORCINE 10 UNIT/ML
5-20 VIAL (ML) INTRAVENOUS DAILY PRN
Status: CANCELLED | OUTPATIENT
Start: 2024-07-25

## 2024-07-24 RX ORDER — HEPARIN SODIUM,PORCINE 10 UNIT/ML
5 VIAL (ML) INTRAVENOUS
Status: DISCONTINUED | OUTPATIENT
Start: 2024-07-24 | End: 2024-07-24 | Stop reason: HOSPADM

## 2024-07-24 RX ORDER — PANTOPRAZOLE SODIUM 40 MG/1
40 TABLET, DELAYED RELEASE ORAL DAILY
Qty: 30 TABLET | Refills: 1 | Status: SHIPPED | OUTPATIENT
Start: 2024-07-24 | End: 2024-08-05

## 2024-07-24 RX ORDER — HEPARIN SODIUM (PORCINE) LOCK FLUSH IV SOLN 100 UNIT/ML 100 UNIT/ML
5 SOLUTION INTRAVENOUS
Status: CANCELLED | OUTPATIENT
Start: 2024-07-25

## 2024-07-24 RX ORDER — HEPARIN SODIUM (PORCINE) LOCK FLUSH IV SOLN 100 UNIT/ML 100 UNIT/ML
5 SOLUTION INTRAVENOUS
OUTPATIENT
Start: 2024-07-24

## 2024-07-24 RX ORDER — LEVOFLOXACIN 250 MG/1
250 TABLET, FILM COATED ORAL DAILY
Qty: 30 TABLET | Refills: 0 | Status: ON HOLD | OUTPATIENT
Start: 2024-07-24 | End: 2024-07-30

## 2024-07-24 RX ORDER — HEPARIN SODIUM,PORCINE 10 UNIT/ML
5-20 VIAL (ML) INTRAVENOUS DAILY PRN
OUTPATIENT
Start: 2024-07-24

## 2024-07-24 RX ORDER — DIPHENHYDRAMINE HYDROCHLORIDE 50 MG/ML
50 INJECTION INTRAMUSCULAR; INTRAVENOUS
Start: 2024-07-24

## 2024-07-24 RX ORDER — EPINEPHRINE 1 MG/ML
0.3 INJECTION, SOLUTION INTRAMUSCULAR; SUBCUTANEOUS EVERY 5 MIN PRN
OUTPATIENT
Start: 2024-07-24

## 2024-07-24 RX ORDER — ALLOPURINOL 300 MG/1
300 TABLET ORAL DAILY
Qty: 7 TABLET | Refills: 0 | Status: ON HOLD | OUTPATIENT
Start: 2024-07-24 | End: 2024-07-30

## 2024-07-24 RX ADMIN — Medication 5 ML: at 06:40

## 2024-07-24 RX ADMIN — Medication 5 ML: at 06:41

## 2024-07-24 RX ADMIN — FILGRASTIM-SNDZ 480 MCG: 480 INJECTION, SOLUTION INTRAVENOUS; SUBCUTANEOUS at 07:55

## 2024-07-24 ASSESSMENT — PAIN SCALES - GENERAL: PAINLEVEL: NO PAIN (0)

## 2024-07-24 NOTE — PROGRESS NOTES
"BMT/Cell Therapy Daily Progress Note   07/24/2024    Patient ID:  Inocente Romero is a 64 year old male, hx of MM, currently undergoing stem cell collection. Today is D+8 of his auto HSCT.    INTERVAL  HISTORY     Felix continues to do well. Still with some intermittent nausea on zofran twice daily but says it's \"not bad.\" No vomiting. No dizziness. Stools are still looser. Still eating well.     Review of Systems: 10 point ROS negative except as noted above.      PHYSICAL EXAM   /78 (BP Location: Right arm, Patient Position: Sitting, Cuff Size: Adult Large)   Pulse 93   Temp 98.5  F (36.9  C) (Oral)   Resp 16   Wt 105.1 kg (231 lb 9.6 oz)   SpO2 98%   BMI 32.30 kg/m      Weight In/Out     Wt Readings from Last 3 Encounters:   07/24/24 105.1 kg (231 lb 9.6 oz)   07/23/24 105.1 kg (231 lb 9.6 oz)   07/22/24 105.5 kg (232 lb 9.6 oz)      [unfilled]     General: NAD   Eyes: sclera anicteric    Lungs: CTAB  Cardiovascular: RRR, no M/R/G  Lymphatics: no edema  Skin: no rashes or petechiae  Neuro: A&O   Additional Findings: clean and dry    LABS AND IMAGING: I have assessed all abnormal lab values for their clinical significance and any values considered clinically significant have been addressed in the assessment and plan.        Lab Results   Component Value Date    WBC 0.1 (LL) 07/24/2024    ANEU 6.0 07/21/2024    HGB 10.7 (L) 07/24/2024    HCT 31.7 (L) 07/24/2024    PLT 17 (LL) 07/24/2024     07/24/2024    POTASSIUM 4.0 07/24/2024    CHLORIDE 107 07/24/2024    CO2 21 (L) 07/24/2024     (H) 07/24/2024    BUN 14.8 07/24/2024    CR 0.85 07/24/2024    MAG 2.1 07/15/2024    INR 1.03 07/16/2024       SYSTEMS-BASED ASSESSMENT AND PLAN     BMT/IEC PROTOCOL for MM Auto PBSCT  Transplants for multiple myeloma:  The patient has Standard risk MM, and the collection goal is 8 million CD34/kg for 2 transplants.     Pt collected 9.84x10^06 CD34/kg   7/15 - D-1: 200mgm/2 Melphalan. Dose reviewed with " Pharmacy. Reviewed possible side effects and what to do for them including fevers.   7/16: Transplant day 0. Thawed cell dose 4.92 x10^6 Cd34/kg. Infused cell dose 3.77x10^6 CD34/kg.      HEME/COAG  - Transfusion parameters: hgb <7g/dL and plts <10,000. no transfusions needed today however probably tomorrow for plts  - Now on daily GCSF    ID  PPx: Levo, Fluc, ACV     GI  CINV ppx: Compazine, Zofran Zypreza PRN    CV:   - Continue Norvasc, lisinopril, metoprolol. Watching BP closely.    FEN/Renal:  - noted on KCL 10meq BID.     Summary:  - GCSF  - probable plts tomorrow      RTC: daily    I spent 20 minutes in the care of this patient today, which included time necessary for preparation for the visit, obtaining history, ordering medications/tests/procedures as medically indicated, review of pertinent medical literature, counseling of the patient, communication of recommendations to the care team, and documentation time.    The longitudinal plan of care for the diagnosis(es)/condition(s) as documented were addressed during this visit. Due to the added complexity in care, I will continue to support Felix in the subsequent management and with ongoing continuity of care.    KAY Foster CNP

## 2024-07-24 NOTE — NURSING NOTE
"Oncology Rooming Note    July 24, 2024 6:54 AM   Inocente Romero is a 64 year old male who presents for:    Chief Complaint   Patient presents with    Blood Draw     Labs drawn from cvc by rn.  VS taken.    Oncology Clinic Visit     Multiple Myeloma      Initial Vitals: /78 (BP Location: Right arm, Patient Position: Sitting, Cuff Size: Adult Large)   Pulse 93   Temp 98.5  F (36.9  C) (Oral)   Resp 16   Wt 105.1 kg (231 lb 9.6 oz)   SpO2 98%   BMI 32.30 kg/m   Estimated body mass index is 32.3 kg/m  as calculated from the following:    Height as of 7/8/24: 1.803 m (5' 11\").    Weight as of this encounter: 105.1 kg (231 lb 9.6 oz). Body surface area is 2.29 meters squared.  No Pain (0) Comment: Data Unavailable   No LMP for male patient.  Allergies reviewed: Yes  Medications reviewed: Yes    Medications: Medication refills not needed today.  Pharmacy name entered into EPIC: [facility] - [place], [place] - [address]    Frailty Screening:   Is the patient here for a new oncology consult visit in cancer care? 2. No      Clinical concerns:  Has been feeling nauseous lately-olazapine has not seemed to be helping       Zina Machado              "

## 2024-07-24 NOTE — NURSING NOTE
Chief Complaint   Patient presents with    Blood Draw     Labs drawn from cvc by rn.  VS taken.     Labs drawn from CVC by rn.  Good blood return noted in both lumens.  Both lumens flushed with NS and heparin.  Pt tolerated well.  VS taken.  Pt checked in for next appt.    Bernice Wolfe RN

## 2024-07-24 NOTE — LETTER
"7/24/2024      Inocente Romero  [address] [address]      Dear Colleague,    Thank you for referring your patient, Inocente Romero, to the [facility] BLOOD AND MARROW TRANSPLANT PROGRAM [place]. Please see a copy of my visit note below.    BMT/Cell Therapy Daily Progress Note   07/24/2024    Patient ID:  Inocente Romero is a 64 year old male, hx of MM, currently undergoing stem cell collection. Today is D+8 of his auto HSCT.    INTERVAL  HISTORY     Felix continues to do well. Still with some intermittent nausea on zofran twice daily but says it's \"not bad.\" No vomiting. No dizziness. Stools are still looser. Still eating well.     Review of Systems: 10 point ROS negative except as noted above.      PHYSICAL EXAM   /78 (BP Location: Right arm, Patient Position: Sitting, Cuff Size: Adult Large)   Pulse 93   Temp 98.5  F (36.9  C) (Oral)   Resp 16   Wt 105.1 kg (231 lb 9.6 oz)   SpO2 98%   BMI 32.30 kg/m      Weight In/Out     Wt Readings from Last 3 Encounters:   07/24/24 105.1 kg (231 lb 9.6 oz)   07/23/24 105.1 kg (231 lb 9.6 oz)   07/22/24 105.5 kg (232 lb 9.6 oz)      [unfilled]     General: NAD   Eyes: sclera anicteric    Lungs: CTAB  Cardiovascular: RRR, no M/R/G  Lymphatics: no edema  Skin: no rashes or petechiae  Neuro: A&O   Additional Findings: clean and dry    LABS AND IMAGING: I have assessed all abnormal lab values for their clinical significance and any values considered clinically significant have been addressed in the assessment and plan.        Lab Results   Component Value Date    WBC 0.1 (LL) 07/24/2024    ANEU 6.0 07/21/2024    HGB 10.7 (L) 07/24/2024    HCT 31.7 (L) 07/24/2024    PLT 17 (LL) 07/24/2024     07/24/2024    POTASSIUM 4.0 07/24/2024    CHLORIDE 107 07/24/2024    CO2 21 (L) 07/24/2024     (H) 07/24/2024    BUN 14.8 07/24/2024    CR 0.85 07/24/2024    MAG 2.1 07/15/2024    INR 1.03 07/16/2024       SYSTEMS-BASED " ASSESSMENT AND PLAN     BMT/IEC PROTOCOL for MM Auto PBSCT  Transplants for multiple myeloma:  The patient has Standard risk MM, and the collection goal is 8 million CD34/kg for 2 transplants.     Pt collected 9.84x10^06 CD34/kg   7/15 - D-1: 200mgm/2 Melphalan. Dose reviewed with Pharmacy. Reviewed possible side effects and what to do for them including fevers.   7/16: Transplant day 0. Thawed cell dose 4.92 x10^6 Cd34/kg. Infused cell dose 3.77x10^6 CD34/kg.      HEME/COAG  - Transfusion parameters: hgb <7g/dL and plts <10,000. no transfusions needed today  - Now on daily GCSF    ID  PPx: Levo, Fluc, ACV     GI  CINV ppx: Compazine, Zofran Zypreza PRN    CV:   - Continue Norvasc, lisinopril, metoprolol. Watching BP closely.    FEN/Renal:  - noted on KCL 10meq BID.     Summary:  - GCSF      RTC: daily    I spent 20 minutes in the care of this patient today, which included time necessary for preparation for the visit, obtaining history, ordering medications/tests/procedures as medically indicated, review of pertinent medical literature, counseling of the patient, communication of recommendations to the care team, and documentation time.    The longitudinal plan of care for the diagnosis(es)/condition(s) as documented were addressed during this visit. Due to the added complexity in care, I will continue to support Felix in the subsequent management and with ongoing continuity of care.    KAY Foster CNP

## 2024-07-25 ENCOUNTER — APPOINTMENT (OUTPATIENT)
Dept: LAB | Facility: CLINIC | Age: 64
End: 2024-07-25
Attending: INTERNAL MEDICINE
Payer: COMMERCIAL

## 2024-07-25 ENCOUNTER — INFUSION THERAPY VISIT (OUTPATIENT)
Dept: TRANSPLANT | Facility: CLINIC | Age: 64
End: 2024-07-25
Attending: INTERNAL MEDICINE
Payer: COMMERCIAL

## 2024-07-25 ENCOUNTER — APPOINTMENT (OUTPATIENT)
Dept: CT IMAGING | Facility: CLINIC | Age: 64
DRG: 809 | End: 2024-07-25
Attending: STUDENT IN AN ORGANIZED HEALTH CARE EDUCATION/TRAINING PROGRAM
Payer: COMMERCIAL

## 2024-07-25 ENCOUNTER — APPOINTMENT (OUTPATIENT)
Dept: GENERAL RADIOLOGY | Facility: CLINIC | Age: 64
DRG: 809 | End: 2024-07-25
Attending: STUDENT IN AN ORGANIZED HEALTH CARE EDUCATION/TRAINING PROGRAM
Payer: COMMERCIAL

## 2024-07-25 ENCOUNTER — TELEPHONE (OUTPATIENT)
Dept: TRANSPLANT | Facility: CLINIC | Age: 64
End: 2024-07-25

## 2024-07-25 ENCOUNTER — HOSPITAL ENCOUNTER (INPATIENT)
Facility: CLINIC | Age: 64
LOS: 5 days | Discharge: HOME | DRG: 809 | End: 2024-07-30
Attending: STUDENT IN AN ORGANIZED HEALTH CARE EDUCATION/TRAINING PROGRAM | Admitting: INTERNAL MEDICINE
Payer: COMMERCIAL

## 2024-07-25 VITALS
RESPIRATION RATE: 16 BRPM | HEART RATE: 72 BPM | WEIGHT: 232.81 LBS | TEMPERATURE: 98.5 F | SYSTOLIC BLOOD PRESSURE: 159 MMHG | DIASTOLIC BLOOD PRESSURE: 78 MMHG | BODY MASS INDEX: 32.47 KG/M2

## 2024-07-25 VITALS
TEMPERATURE: 98.2 F | BODY MASS INDEX: 32.75 KG/M2 | DIASTOLIC BLOOD PRESSURE: 66 MMHG | OXYGEN SATURATION: 98 % | WEIGHT: 234.8 LBS | HEART RATE: 94 BPM | SYSTOLIC BLOOD PRESSURE: 135 MMHG | RESPIRATION RATE: 16 BRPM

## 2024-07-25 VITALS
DIASTOLIC BLOOD PRESSURE: 78 MMHG | TEMPERATURE: 98.9 F | HEART RATE: 95 BPM | RESPIRATION RATE: 16 BRPM | OXYGEN SATURATION: 98 % | SYSTOLIC BLOOD PRESSURE: 146 MMHG

## 2024-07-25 DIAGNOSIS — D70.9 NEUTROPENIC FEVER (H): ICD-10-CM

## 2024-07-25 DIAGNOSIS — C90.00 MULTIPLE MYELOMA, REMISSION STATUS UNSPECIFIED (H): Primary | ICD-10-CM

## 2024-07-25 DIAGNOSIS — R00.0 TACHYCARDIA, UNSPECIFIED: ICD-10-CM

## 2024-07-25 DIAGNOSIS — R68.89 RIGORS: ICD-10-CM

## 2024-07-25 DIAGNOSIS — Z94.81 BONE MARROW TRANSPLANT STATUS (H): ICD-10-CM

## 2024-07-25 DIAGNOSIS — R51.9 NONINTRACTABLE HEADACHE, UNSPECIFIED CHRONICITY PATTERN, UNSPECIFIED HEADACHE TYPE: ICD-10-CM

## 2024-07-25 DIAGNOSIS — Z92.89: ICD-10-CM

## 2024-07-25 DIAGNOSIS — R50.81 NEUTROPENIC FEVER (H): ICD-10-CM

## 2024-07-25 LAB
ABO/RH(D): NORMAL
ACANTHOCYTES BLD QL SMEAR: NORMAL
ACANTHOCYTES BLD QL SMEAR: NORMAL
ALBUMIN SERPL BCG-MCNC: 3.9 G/DL (ref 3.5–5.2)
ALBUMIN UR-MCNC: NEGATIVE MG/DL
ALP SERPL-CCNC: 42 U/L (ref 40–150)
ALT SERPL W P-5'-P-CCNC: <5 U/L (ref 0–70)
ANION GAP SERPL CALCULATED.3IONS-SCNC: 12 MMOL/L (ref 7–15)
ANION GAP SERPL CALCULATED.3IONS-SCNC: 9 MMOL/L (ref 7–15)
ANTIBODY SCREEN: NEGATIVE
APPEARANCE UR: CLEAR
AST SERPL W P-5'-P-CCNC: 9 U/L (ref 0–45)
AUER BODIES BLD QL SMEAR: NORMAL
AUER BODIES BLD QL SMEAR: NORMAL
BASO STIPL BLD QL SMEAR: NORMAL
BASO STIPL BLD QL SMEAR: NORMAL
BASOPHILS # BLD AUTO: ABNORMAL 10*3/UL
BASOPHILS # BLD AUTO: ABNORMAL 10*3/UL
BASOPHILS NFR BLD AUTO: ABNORMAL %
BASOPHILS NFR BLD AUTO: ABNORMAL %
BILIRUB SERPL-MCNC: 0.4 MG/DL
BILIRUB UR QL STRIP: NEGATIVE
BITE CELLS BLD QL SMEAR: NORMAL
BITE CELLS BLD QL SMEAR: NORMAL
BLISTER CELLS BLD QL SMEAR: NORMAL
BLISTER CELLS BLD QL SMEAR: NORMAL
BUN SERPL-MCNC: 11.5 MG/DL (ref 8–23)
BUN SERPL-MCNC: 15.1 MG/DL (ref 8–23)
BURR CELLS BLD QL SMEAR: NORMAL
BURR CELLS BLD QL SMEAR: NORMAL
C DIFF TOX B STL QL: NEGATIVE
CALCIUM SERPL-MCNC: 8.8 MG/DL (ref 8.8–10.4)
CALCIUM SERPL-MCNC: 9 MG/DL (ref 8.8–10.4)
CHLORIDE SERPL-SCNC: 102 MMOL/L (ref 98–107)
CHLORIDE SERPL-SCNC: 107 MMOL/L (ref 98–107)
COLOR UR AUTO: ABNORMAL
CREAT SERPL-MCNC: 0.86 MG/DL (ref 0.67–1.17)
CREAT SERPL-MCNC: 1 MG/DL (ref 0.67–1.17)
DACRYOCYTES BLD QL SMEAR: NORMAL
DACRYOCYTES BLD QL SMEAR: NORMAL
EGFRCR SERPLBLD CKD-EPI 2021: 85 ML/MIN/1.73M2
EGFRCR SERPLBLD CKD-EPI 2021: >90 ML/MIN/1.73M2
ELLIPTOCYTES BLD QL SMEAR: NORMAL
ELLIPTOCYTES BLD QL SMEAR: NORMAL
EOSINOPHIL # BLD AUTO: ABNORMAL 10*3/UL
EOSINOPHIL # BLD AUTO: ABNORMAL 10*3/UL
EOSINOPHIL NFR BLD AUTO: ABNORMAL %
EOSINOPHIL NFR BLD AUTO: ABNORMAL %
ERYTHROCYTE [DISTWIDTH] IN BLOOD BY AUTOMATED COUNT: 12.9 % (ref 10–15)
ERYTHROCYTE [DISTWIDTH] IN BLOOD BY AUTOMATED COUNT: 12.9 % (ref 10–15)
FLUAV RNA SPEC QL NAA+PROBE: NEGATIVE
FLUBV RNA RESP QL NAA+PROBE: NEGATIVE
FRAGMENTS BLD QL SMEAR: NORMAL
FRAGMENTS BLD QL SMEAR: NORMAL
GLUCOSE SERPL-MCNC: 114 MG/DL (ref 70–99)
GLUCOSE SERPL-MCNC: 115 MG/DL (ref 70–99)
GLUCOSE UR STRIP-MCNC: NEGATIVE MG/DL
HCO3 SERPL-SCNC: 20 MMOL/L (ref 22–29)
HCO3 SERPL-SCNC: 22 MMOL/L (ref 22–29)
HCT VFR BLD AUTO: 29 % (ref 40–53)
HCT VFR BLD AUTO: 31.9 % (ref 40–53)
HGB BLD-MCNC: 10 G/DL (ref 13.3–17.7)
HGB BLD-MCNC: 10.6 G/DL (ref 13.3–17.7)
HGB C CRYSTALS: NORMAL
HGB C CRYSTALS: NORMAL
HGB UR QL STRIP: ABNORMAL
HOWELL-JOLLY BOD BLD QL SMEAR: NORMAL
HOWELL-JOLLY BOD BLD QL SMEAR: NORMAL
IMM GRANULOCYTES # BLD: ABNORMAL 10*3/UL
IMM GRANULOCYTES # BLD: ABNORMAL 10*3/UL
IMM GRANULOCYTES NFR BLD: ABNORMAL %
IMM GRANULOCYTES NFR BLD: ABNORMAL %
KETONES UR STRIP-MCNC: NEGATIVE MG/DL
LEUKOCYTE ESTERASE UR QL STRIP: NEGATIVE
LYMPHOCYTES # BLD AUTO: ABNORMAL 10*3/UL
LYMPHOCYTES # BLD AUTO: ABNORMAL 10*3/UL
LYMPHOCYTES NFR BLD AUTO: ABNORMAL %
LYMPHOCYTES NFR BLD AUTO: ABNORMAL %
MAGNESIUM SERPL-MCNC: 1.5 MG/DL (ref 1.7–2.3)
MCH RBC QN AUTO: 31.7 PG (ref 26.5–33)
MCH RBC QN AUTO: 31.7 PG (ref 26.5–33)
MCHC RBC AUTO-ENTMCNC: 33.2 G/DL (ref 31.5–36.5)
MCHC RBC AUTO-ENTMCNC: 34.5 G/DL (ref 31.5–36.5)
MCV RBC AUTO: 92 FL (ref 78–100)
MCV RBC AUTO: 96 FL (ref 78–100)
MONOCYTES # BLD AUTO: ABNORMAL 10*3/UL
MONOCYTES # BLD AUTO: ABNORMAL 10*3/UL
MONOCYTES NFR BLD AUTO: ABNORMAL %
MONOCYTES NFR BLD AUTO: ABNORMAL %
MRSA DNA SPEC QL NAA+PROBE: NEGATIVE
MUCOUS THREADS #/AREA URNS LPF: PRESENT /LPF
NEUTROPHILS # BLD AUTO: ABNORMAL 10*3/UL
NEUTROPHILS # BLD AUTO: ABNORMAL 10*3/UL
NEUTROPHILS NFR BLD AUTO: ABNORMAL %
NEUTROPHILS NFR BLD AUTO: ABNORMAL %
NEUTS HYPERSEG BLD QL SMEAR: NORMAL
NEUTS HYPERSEG BLD QL SMEAR: NORMAL
NITRATE UR QL: NEGATIVE
PH UR STRIP: 5 [PH] (ref 5–7)
PHOSPHATE SERPL-MCNC: 3.5 MG/DL (ref 2.5–4.5)
PLAT MORPH BLD: NORMAL
PLAT MORPH BLD: NORMAL
PLATELET # BLD AUTO: 16 10E3/UL (ref 150–450)
PLATELET # BLD AUTO: 7 10E3/UL (ref 150–450)
POLYCHROMASIA BLD QL SMEAR: NORMAL
POLYCHROMASIA BLD QL SMEAR: NORMAL
POTASSIUM SERPL-SCNC: 3.5 MMOL/L (ref 3.4–5.3)
POTASSIUM SERPL-SCNC: 4.1 MMOL/L (ref 3.4–5.3)
PROCALCITONIN SERPL IA-MCNC: 0.23 NG/ML
PROT SERPL-MCNC: 6.7 G/DL (ref 6.4–8.3)
RBC # BLD AUTO: 3.15 10E6/UL (ref 4.4–5.9)
RBC # BLD AUTO: 3.34 10E6/UL (ref 4.4–5.9)
RBC AGGLUT BLD QL: NORMAL
RBC AGGLUT BLD QL: NORMAL
RBC MORPH BLD: NORMAL
RBC MORPH BLD: NORMAL
RBC URINE: <1 /HPF
ROULEAUX BLD QL SMEAR: NORMAL
ROULEAUX BLD QL SMEAR: NORMAL
RSV RNA SPEC NAA+PROBE: NEGATIVE
SA TARGET DNA: NEGATIVE
SARS-COV-2 RNA RESP QL NAA+PROBE: NEGATIVE
SICKLE CELLS BLD QL SMEAR: NORMAL
SICKLE CELLS BLD QL SMEAR: NORMAL
SMUDGE CELLS BLD QL SMEAR: NORMAL
SMUDGE CELLS BLD QL SMEAR: NORMAL
SODIUM SERPL-SCNC: 134 MMOL/L (ref 135–145)
SODIUM SERPL-SCNC: 138 MMOL/L (ref 135–145)
SP GR UR STRIP: 1.01 (ref 1–1.03)
SPECIMEN EXPIRATION DATE: NORMAL
SPHEROCYTES BLD QL SMEAR: NORMAL
SPHEROCYTES BLD QL SMEAR: NORMAL
STOMATOCYTES BLD QL SMEAR: NORMAL
STOMATOCYTES BLD QL SMEAR: NORMAL
TARGETS BLD QL SMEAR: NORMAL
TARGETS BLD QL SMEAR: NORMAL
TOXIC GRANULES BLD QL SMEAR: NORMAL
TOXIC GRANULES BLD QL SMEAR: NORMAL
UROBILINOGEN UR STRIP-MCNC: NORMAL MG/DL
VARIANT LYMPHS BLD QL SMEAR: NORMAL
VARIANT LYMPHS BLD QL SMEAR: NORMAL
WBC # BLD AUTO: 0.1 10E3/UL (ref 4–11)
WBC # BLD AUTO: 0.1 10E3/UL (ref 4–11)
WBC URINE: 5 /HPF

## 2024-07-25 PROCEDURE — 87086 URINE CULTURE/COLONY COUNT: CPT | Performed by: STUDENT IN AN ORGANIZED HEALTH CARE EDUCATION/TRAINING PROGRAM

## 2024-07-25 PROCEDURE — 120N000005 HC R&B MS OVERFLOW UMMC

## 2024-07-25 PROCEDURE — 87081 CULTURE SCREEN ONLY: CPT | Performed by: INTERNAL MEDICINE

## 2024-07-25 PROCEDURE — 85027 COMPLETE CBC AUTOMATED: CPT | Performed by: STUDENT IN AN ORGANIZED HEALTH CARE EDUCATION/TRAINING PROGRAM

## 2024-07-25 PROCEDURE — 71046 X-RAY EXAM CHEST 2 VIEWS: CPT

## 2024-07-25 PROCEDURE — 80048 BASIC METABOLIC PNL TOTAL CA: CPT

## 2024-07-25 PROCEDURE — 87507 IADNA-DNA/RNA PROBE TQ 12-25: CPT | Performed by: INTERNAL MEDICINE

## 2024-07-25 PROCEDURE — 36592 COLLECT BLOOD FROM PICC: CPT

## 2024-07-25 PROCEDURE — 250N000011 HC RX IP 250 OP 636: Performed by: STUDENT IN AN ORGANIZED HEALTH CARE EDUCATION/TRAINING PROGRAM

## 2024-07-25 PROCEDURE — 96372 THER/PROPH/DIAG INJ SC/IM: CPT

## 2024-07-25 PROCEDURE — 86900 BLOOD TYPING SEROLOGIC ABO: CPT | Performed by: INTERNAL MEDICINE

## 2024-07-25 PROCEDURE — 84100 ASSAY OF PHOSPHORUS: CPT | Performed by: STUDENT IN AN ORGANIZED HEALTH CARE EDUCATION/TRAINING PROGRAM

## 2024-07-25 PROCEDURE — 250N000013 HC RX MED GY IP 250 OP 250 PS 637: Performed by: STUDENT IN AN ORGANIZED HEALTH CARE EDUCATION/TRAINING PROGRAM

## 2024-07-25 PROCEDURE — G2211 COMPLEX E/M VISIT ADD ON: HCPCS

## 2024-07-25 PROCEDURE — 70450 CT HEAD/BRAIN W/O DYE: CPT

## 2024-07-25 PROCEDURE — 36430 TRANSFUSION BLD/BLD COMPNT: CPT

## 2024-07-25 PROCEDURE — 99291 CRITICAL CARE FIRST HOUR: CPT | Performed by: STUDENT IN AN ORGANIZED HEALTH CARE EDUCATION/TRAINING PROGRAM

## 2024-07-25 PROCEDURE — 83735 ASSAY OF MAGNESIUM: CPT | Performed by: STUDENT IN AN ORGANIZED HEALTH CARE EDUCATION/TRAINING PROGRAM

## 2024-07-25 PROCEDURE — 84145 PROCALCITONIN (PCT): CPT | Performed by: STUDENT IN AN ORGANIZED HEALTH CARE EDUCATION/TRAINING PROGRAM

## 2024-07-25 PROCEDURE — 80053 COMPREHEN METABOLIC PANEL: CPT

## 2024-07-25 PROCEDURE — 36415 COLL VENOUS BLD VENIPUNCTURE: CPT | Performed by: INTERNAL MEDICINE

## 2024-07-25 PROCEDURE — 258N000003 HC RX IP 258 OP 636: Performed by: INTERNAL MEDICINE

## 2024-07-25 PROCEDURE — 87040 BLOOD CULTURE FOR BACTERIA: CPT | Performed by: STUDENT IN AN ORGANIZED HEALTH CARE EDUCATION/TRAINING PROGRAM

## 2024-07-25 PROCEDURE — 250N000011 HC RX IP 250 OP 636: Performed by: INTERNAL MEDICINE

## 2024-07-25 PROCEDURE — 82040 ASSAY OF SERUM ALBUMIN: CPT | Performed by: STUDENT IN AN ORGANIZED HEALTH CARE EDUCATION/TRAINING PROGRAM

## 2024-07-25 PROCEDURE — 81001 URINALYSIS AUTO W/SCOPE: CPT | Performed by: STUDENT IN AN ORGANIZED HEALTH CARE EDUCATION/TRAINING PROGRAM

## 2024-07-25 PROCEDURE — G0463 HOSPITAL OUTPT CLINIC VISIT: HCPCS | Mod: 25

## 2024-07-25 PROCEDURE — 85027 COMPLETE CBC AUTOMATED: CPT

## 2024-07-25 PROCEDURE — 87641 MR-STAPH DNA AMP PROBE: CPT | Performed by: STUDENT IN AN ORGANIZED HEALTH CARE EDUCATION/TRAINING PROGRAM

## 2024-07-25 PROCEDURE — 86901 BLOOD TYPING SEROLOGIC RH(D): CPT | Performed by: INTERNAL MEDICINE

## 2024-07-25 PROCEDURE — 99285 EMERGENCY DEPT VISIT HI MDM: CPT | Mod: 25 | Performed by: STUDENT IN AN ORGANIZED HEALTH CARE EDUCATION/TRAINING PROGRAM

## 2024-07-25 PROCEDURE — 36415 COLL VENOUS BLD VENIPUNCTURE: CPT | Performed by: STUDENT IN AN ORGANIZED HEALTH CARE EDUCATION/TRAINING PROGRAM

## 2024-07-25 PROCEDURE — 250N000013 HC RX MED GY IP 250 OP 250 PS 637: Performed by: INTERNAL MEDICINE

## 2024-07-25 PROCEDURE — P9037 PLATE PHERES LEUKOREDU IRRAD: HCPCS

## 2024-07-25 PROCEDURE — 99213 OFFICE O/P EST LOW 20 MIN: CPT

## 2024-07-25 PROCEDURE — 71046 X-RAY EXAM CHEST 2 VIEWS: CPT | Mod: 26 | Performed by: STUDENT IN AN ORGANIZED HEALTH CARE EDUCATION/TRAINING PROGRAM

## 2024-07-25 PROCEDURE — 87637 SARSCOV2&INF A&B&RSV AMP PRB: CPT | Performed by: STUDENT IN AN ORGANIZED HEALTH CARE EDUCATION/TRAINING PROGRAM

## 2024-07-25 PROCEDURE — 70450 CT HEAD/BRAIN W/O DYE: CPT | Mod: 26 | Performed by: RADIOLOGY

## 2024-07-25 PROCEDURE — 87493 C DIFF AMPLIFIED PROBE: CPT | Performed by: INTERNAL MEDICINE

## 2024-07-25 PROCEDURE — 99223 1ST HOSP IP/OBS HIGH 75: CPT | Mod: GC | Performed by: INTERNAL MEDICINE

## 2024-07-25 PROCEDURE — 250N000011 HC RX IP 250 OP 636

## 2024-07-25 RX ORDER — CEFEPIME HYDROCHLORIDE 2 G/1
2 INJECTION, POWDER, FOR SOLUTION INTRAVENOUS EVERY 8 HOURS
Status: DISCONTINUED | OUTPATIENT
Start: 2024-07-26 | End: 2024-07-30 | Stop reason: HOSPADM

## 2024-07-25 RX ORDER — LORAZEPAM 2 MG/ML
.5-1 INJECTION INTRAMUSCULAR EVERY 4 HOURS PRN
Status: CANCELLED | OUTPATIENT
Start: 2024-07-25

## 2024-07-25 RX ORDER — METOPROLOL SUCCINATE 50 MG/1
100 TABLET, EXTENDED RELEASE ORAL EVERY EVENING
Status: DISCONTINUED | OUTPATIENT
Start: 2024-07-25 | End: 2024-07-30 | Stop reason: HOSPADM

## 2024-07-25 RX ORDER — ONDANSETRON 8 MG/1
8 TABLET, FILM COATED ORAL EVERY 8 HOURS PRN
Status: DISCONTINUED | OUTPATIENT
Start: 2024-07-25 | End: 2024-07-30 | Stop reason: HOSPADM

## 2024-07-25 RX ORDER — LEVOFLOXACIN 250 MG/1
250 TABLET, FILM COATED ORAL DAILY
Status: DISCONTINUED | OUTPATIENT
Start: 2024-07-26 | End: 2024-07-29

## 2024-07-25 RX ORDER — LISINOPRIL 10 MG/1
10 TABLET ORAL EVERY EVENING
Status: DISCONTINUED | OUTPATIENT
Start: 2024-07-25 | End: 2024-07-29

## 2024-07-25 RX ORDER — LISINOPRIL 10 MG/1
20 TABLET ORAL EVERY MORNING
Status: DISCONTINUED | OUTPATIENT
Start: 2024-07-26 | End: 2024-07-30 | Stop reason: HOSPADM

## 2024-07-25 RX ORDER — POTASSIUM CHLORIDE 750 MG/1
20 TABLET, EXTENDED RELEASE ORAL ONCE
Status: COMPLETED | OUTPATIENT
Start: 2024-07-25 | End: 2024-07-25

## 2024-07-25 RX ORDER — PROCHLORPERAZINE MALEATE 5 MG
5-10 TABLET ORAL EVERY 6 HOURS PRN
Status: CANCELLED | OUTPATIENT
Start: 2024-07-25

## 2024-07-25 RX ORDER — POTASSIUM CHLORIDE 750 MG/1
10 CAPSULE, EXTENDED RELEASE ORAL 2 TIMES DAILY
Status: DISCONTINUED | OUTPATIENT
Start: 2024-07-25 | End: 2024-07-25

## 2024-07-25 RX ORDER — MAGNESIUM 200 MG
200 TABLET ORAL DAILY
Status: DISCONTINUED | OUTPATIENT
Start: 2024-07-26 | End: 2024-07-25

## 2024-07-25 RX ORDER — HEPARIN SODIUM,PORCINE 10 UNIT/ML
5 VIAL (ML) INTRAVENOUS ONCE
Status: COMPLETED | OUTPATIENT
Start: 2024-07-25 | End: 2024-07-25

## 2024-07-25 RX ORDER — LORAZEPAM 0.5 MG/1
.5-1 TABLET ORAL EVERY 4 HOURS PRN
Status: DISCONTINUED | OUTPATIENT
Start: 2024-07-25 | End: 2024-07-25

## 2024-07-25 RX ORDER — MAGNESIUM SULFATE HEPTAHYDRATE 40 MG/ML
4 INJECTION, SOLUTION INTRAVENOUS ONCE
Status: COMPLETED | OUTPATIENT
Start: 2024-07-25 | End: 2024-07-26

## 2024-07-25 RX ORDER — ACETAMINOPHEN 325 MG/1
325-650 TABLET ORAL EVERY 4 HOURS PRN
Status: DISCONTINUED | OUTPATIENT
Start: 2024-07-25 | End: 2024-07-30 | Stop reason: HOSPADM

## 2024-07-25 RX ORDER — VANCOMYCIN HYDROCHLORIDE
1250 EVERY 12 HOURS
Status: DISCONTINUED | OUTPATIENT
Start: 2024-07-25 | End: 2024-07-25

## 2024-07-25 RX ORDER — LORAZEPAM 0.5 MG/1
.5-1 TABLET ORAL EVERY 4 HOURS PRN
Status: CANCELLED | OUTPATIENT
Start: 2024-07-25

## 2024-07-25 RX ORDER — ACETAMINOPHEN 325 MG/1
975 TABLET ORAL ONCE
Status: COMPLETED | OUTPATIENT
Start: 2024-07-25 | End: 2024-07-25

## 2024-07-25 RX ORDER — CEFEPIME HYDROCHLORIDE 2 G/1
2 INJECTION, POWDER, FOR SOLUTION INTRAVENOUS ONCE
Status: COMPLETED | OUTPATIENT
Start: 2024-07-25 | End: 2024-07-25

## 2024-07-25 RX ORDER — ALLOPURINOL 300 MG/1
300 TABLET ORAL DAILY
Status: DISCONTINUED | OUTPATIENT
Start: 2024-07-26 | End: 2024-07-26

## 2024-07-25 RX ORDER — LORAZEPAM 2 MG/ML
.5-1 INJECTION INTRAMUSCULAR EVERY 4 HOURS PRN
Status: DISCONTINUED | OUTPATIENT
Start: 2024-07-25 | End: 2024-07-25

## 2024-07-25 RX ORDER — ACETAMINOPHEN 325 MG/1
325-650 TABLET ORAL EVERY 4 HOURS PRN
Status: CANCELLED | OUTPATIENT
Start: 2024-07-25

## 2024-07-25 RX ORDER — FLUCONAZOLE 200 MG/1
200 TABLET ORAL DAILY
Status: DISCONTINUED | OUTPATIENT
Start: 2024-07-26 | End: 2024-07-30 | Stop reason: HOSPADM

## 2024-07-25 RX ORDER — PROCHLORPERAZINE MALEATE 5 MG
5-10 TABLET ORAL EVERY 6 HOURS PRN
Status: DISCONTINUED | OUTPATIENT
Start: 2024-07-25 | End: 2024-07-25

## 2024-07-25 RX ORDER — PROCHLORPERAZINE MALEATE 5 MG
5-10 TABLET ORAL EVERY 6 HOURS PRN
Status: DISCONTINUED | OUTPATIENT
Start: 2024-07-25 | End: 2024-07-26

## 2024-07-25 RX ORDER — OLANZAPINE 2.5 MG/1
5 TABLET, FILM COATED ORAL AT BEDTIME
Status: DISCONTINUED | OUTPATIENT
Start: 2024-07-25 | End: 2024-07-30 | Stop reason: HOSPADM

## 2024-07-25 RX ORDER — PANTOPRAZOLE SODIUM 40 MG/1
40 TABLET, DELAYED RELEASE ORAL DAILY
Status: DISCONTINUED | OUTPATIENT
Start: 2024-07-26 | End: 2024-07-30 | Stop reason: HOSPADM

## 2024-07-25 RX ORDER — AMLODIPINE BESYLATE 10 MG/1
10 TABLET ORAL DAILY
Status: DISCONTINUED | OUTPATIENT
Start: 2024-07-26 | End: 2024-07-30 | Stop reason: HOSPADM

## 2024-07-25 RX ORDER — ACYCLOVIR 800 MG/1
800 TABLET ORAL 2 TIMES DAILY
Status: DISCONTINUED | OUTPATIENT
Start: 2024-07-25 | End: 2024-07-30 | Stop reason: HOSPADM

## 2024-07-25 RX ORDER — HEPARIN SODIUM,PORCINE 10 UNIT/ML
5-20 VIAL (ML) INTRAVENOUS DAILY PRN
OUTPATIENT
Start: 2024-07-26

## 2024-07-25 RX ORDER — HEPARIN SODIUM (PORCINE) LOCK FLUSH IV SOLN 100 UNIT/ML 100 UNIT/ML
5 SOLUTION INTRAVENOUS
OUTPATIENT
Start: 2024-07-26

## 2024-07-25 RX ADMIN — POTASSIUM CHLORIDE 20 MEQ: 750 TABLET, EXTENDED RELEASE ORAL at 22:21

## 2024-07-25 RX ADMIN — SODIUM CHLORIDE, POTASSIUM CHLORIDE, SODIUM LACTATE AND CALCIUM CHLORIDE 1000 ML: 600; 310; 30; 20 INJECTION, SOLUTION INTRAVENOUS at 21:40

## 2024-07-25 RX ADMIN — Medication 5 ML: at 07:24

## 2024-07-25 RX ADMIN — ACETAMINOPHEN 975 MG: 325 TABLET, FILM COATED ORAL at 20:53

## 2024-07-25 RX ADMIN — MAGNESIUM SULFATE IN WATER 4 G: 40 INJECTION, SOLUTION INTRAVENOUS at 23:00

## 2024-07-25 RX ADMIN — CEFEPIME HYDROCHLORIDE 2 G: 2 INJECTION, POWDER, FOR SOLUTION INTRAVENOUS at 20:53

## 2024-07-25 RX ADMIN — FILGRASTIM-SNDZ 480 MCG: 480 INJECTION, SOLUTION INTRAVENOUS; SUBCUTANEOUS at 08:31

## 2024-07-25 RX ADMIN — OLANZAPINE 5 MG: 2.5 TABLET, FILM COATED ORAL at 21:39

## 2024-07-25 RX ADMIN — Medication 5 ML: at 07:25

## 2024-07-25 ASSESSMENT — COLUMBIA-SUICIDE SEVERITY RATING SCALE - C-SSRS
2. HAVE YOU ACTUALLY HAD ANY THOUGHTS OF KILLING YOURSELF IN THE PAST MONTH?: NO
1. IN THE PAST MONTH, HAVE YOU WISHED YOU WERE DEAD OR WISHED YOU COULD GO TO SLEEP AND NOT WAKE UP?: NO
6. HAVE YOU EVER DONE ANYTHING, STARTED TO DO ANYTHING, OR PREPARED TO DO ANYTHING TO END YOUR LIFE?: NO

## 2024-07-25 ASSESSMENT — ACTIVITIES OF DAILY LIVING (ADL)
ADLS_ACUITY_SCORE: 33
ADLS_ACUITY_SCORE: 35
ADLS_ACUITY_SCORE: 33

## 2024-07-25 ASSESSMENT — PAIN SCALES - GENERAL: PAINLEVEL: NO PAIN (0)

## 2024-07-25 NOTE — PROGRESS NOTES
Infusion Nursing Note:  Inocente Romero presents today for add-on infusion.    Patient seen by provider today: Yes: Nguyen Sharp   present during visit today: Not Applicable.    Note: Patient received platelets per blood plan. GCSF given in L lower abdomen per therapy plan.      Intravenous Access:  [place].    Treatment Conditions:  Results reviewed, labs MET treatment parameters, ok to proceed with treatment.      Post Infusion Assessment:  Patient tolerated infusion without incident.       Discharge Plan:   Patient and/or family verbalized understanding of discharge instructions and all questions answered.      Mya Smith RN

## 2024-07-25 NOTE — PROGRESS NOTES
BMT/Cell Therapy Daily Progress Note   07/25/2024    Patient ID:  Inocente Romero is a 64 year old male, hx of MM, currently undergoing stem cell collection. Today is D+8 of his auto HSCT.    INTERVAL  HISTORY     Felix continues to do well. Nauseated yesterday and spent a good portion of the day in bed. Some nausea this morning. No vomiting, no dizziness. Still eating and drinking well. Stools are loose. No fevers or new infectious symptoms.     Review of Systems: 10 point ROS negative except as noted above.      PHYSICAL EXAM   /66   Pulse 94   Temp 98.2  F (36.8  C) (Oral)   Resp 16   Wt 106.5 kg (234 lb 12.8 oz)   SpO2 98%   BMI 32.75 kg/m      Weight In/Out     Wt Readings from Last 3 Encounters:   07/25/24 106.5 kg (234 lb 12.8 oz)   07/24/24 105.1 kg (231 lb 9.6 oz)   07/23/24 105.1 kg (231 lb 9.6 oz)      [unfilled]     General: NAD   Eyes: sclera anicteric    Lungs: CTAB  Cardiovascular: RRR, no M/R/G  Lymphatics: no edema  Skin: no rashes or petechiae  Neuro: A&O   Additional Findings: clean and dry    LABS AND IMAGING: I have assessed all abnormal lab values for their clinical significance and any values considered clinically significant have been addressed in the assessment and plan.        Lab Results   Component Value Date    WBC 0.1 (LL) 07/24/2024    ANEU 6.0 07/21/2024    HGB 10.7 (L) 07/24/2024    HCT 31.7 (L) 07/24/2024    PLT 17 (LL) 07/24/2024     07/24/2024    POTASSIUM 4.0 07/24/2024    CHLORIDE 107 07/24/2024    CO2 21 (L) 07/24/2024     (H) 07/24/2024    BUN 14.8 07/24/2024    CR 0.85 07/24/2024    MAG 2.1 07/15/2024    INR 1.03 07/16/2024       SYSTEMS-BASED ASSESSMENT AND PLAN     BMT/IEC PROTOCOL for MM Auto PBSCT  Transplants for multiple myeloma:  The patient has Standard risk MM, and the collection goal is 8 million CD34/kg for 2 transplants.     Pt collected 9.84x10^06 CD34/kg   7/15 - D-1: 200mgm/2 Melphalan. Dose reviewed with Pharmacy. Reviewed  possible side effects and what to do for them including fevers.   7/16: Transplant day 0. Thawed cell dose 4.92 x10^6 Cd34/kg. Infused cell dose 3.77x10^6 CD34/kg.      HEME/COAG  - Transfusion parameters: hgb <7g/dL and plts <10,000. Platelets 7/25  - Now on daily GCSF    ID  PPx: Levo, Fluc, ACV     GI  CINV ppx: Schedule compazine today. continue Zofran, Zypreza PRN    CV:   - Continue Norvasc, lisinopril, metoprolol. Watching BP closely.    FEN/Renal:  - noted on KCL 10meq BID.     Summary:  - GCSF  - platelets  - Dressing change      RTC: daily    I spent 20 minutes in the care of this patient today, which included time necessary for preparation for the visit, obtaining history, ordering medications/tests/procedures as medically indicated, review of pertinent medical literature, counseling of the patient, communication of recommendations to the care team, and documentation time.    The longitudinal plan of care for the diagnosis(es)/condition(s) as documented were addressed during this visit. Due to the added complexity in care, I will continue to support Felix in the subsequent management and with ongoing continuity of care.    Nguyen Sharp NP

## 2024-07-25 NOTE — LETTER
7/25/2024      Inocente Romero  [address]  [place] [address]      Dear Colleague,    Thank you for referring your patient, Inocente Romero, to the [facility] BLOOD AND MARROW TRANSPLANT PROGRAM [place]. Please see a copy of my visit note below.    BMT/Cell Therapy Daily Progress Note   07/25/2024    Patient ID:  Inocente Romero is a 64 year old male, hx of MM, currently undergoing stem cell collection. Today is D+8 of his auto HSCT.    INTERVAL  HISTORY     Felix continues to do well. Nauseated yesterday and spent a good portion of the day in bed. Some nausea this morning. No vomiting, no dizziness. Still eating and drinking well. Stools are loose. No fevers or new infectious symptoms.     Review of Systems: 10 point ROS negative except as noted above.      PHYSICAL EXAM   /66   Pulse 94   Temp 98.2  F (36.8  C) (Oral)   Resp 16   Wt 106.5 kg (234 lb 12.8 oz)   SpO2 98%   BMI 32.75 kg/m      Weight In/Out     Wt Readings from Last 3 Encounters:   07/25/24 106.5 kg (234 lb 12.8 oz)   07/24/24 105.1 kg (231 lb 9.6 oz)   07/23/24 105.1 kg (231 lb 9.6 oz)      [unfilled]     General: NAD   Eyes: sclera anicteric    Lungs: CTAB  Cardiovascular: RRR, no M/R/G  Lymphatics: no edema  Skin: no rashes or petechiae  Neuro: A&O   Additional Findings: clean and dry    LABS AND IMAGING: I have assessed all abnormal lab values for their clinical significance and any values considered clinically significant have been addressed in the assessment and plan.        Lab Results   Component Value Date    WBC 0.1 (LL) 07/24/2024    ANEU 6.0 07/21/2024    HGB 10.7 (L) 07/24/2024    HCT 31.7 (L) 07/24/2024    PLT 17 (LL) 07/24/2024     07/24/2024    POTASSIUM 4.0 07/24/2024    CHLORIDE 107 07/24/2024    CO2 21 (L) 07/24/2024     (H) 07/24/2024    BUN 14.8 07/24/2024    CR 0.85 07/24/2024    MAG 2.1 07/15/2024    INR 1.03 07/16/2024       SYSTEMS-BASED ASSESSMENT AND PLAN      BMT/IEC PROTOCOL for MM Auto PBSCT  Transplants for multiple myeloma:  The patient has Standard risk MM, and the collection goal is 8 million CD34/kg for 2 transplants.     Pt collected 9.84x10^06 CD34/kg   7/15 - D-1: 200mgm/2 Melphalan. Dose reviewed with Pharmacy. Reviewed possible side effects and what to do for them including fevers.   7/16: Transplant day 0. Thawed cell dose 4.92 x10^6 Cd34/kg. Infused cell dose 3.77x10^6 CD34/kg.      HEME/COAG  - Transfusion parameters: hgb <7g/dL and plts <10,000. Platelets 7/25  - Now on daily GCSF    ID  PPx: Levo, Fluc, ACV     GI  CINV ppx: Schedule compazine today. continue Zofran, Zypreza PRN    CV:   - Continue Norvasc, lisinopril, metoprolol. Watching BP closely.    FEN/Renal:  - noted on KCL 10meq BID.     Summary:  - GCSF  - platelets  - Dressing change      RTC: daily    I spent 20 minutes in the care of this patient today, which included time necessary for preparation for the visit, obtaining history, ordering medications/tests/procedures as medically indicated, review of pertinent medical literature, counseling of the patient, communication of recommendations to the care team, and documentation time.    The longitudinal plan of care for the diagnosis(es)/condition(s) as documented were addressed during this visit. Due to the added complexity in care, I will continue to support Felix in the subsequent management and with ongoing continuity of care.    Nguyen Sharp NP

## 2024-07-25 NOTE — TELEPHONE ENCOUNTER
BMT Nurse Triage - Generic/Other Symptoms     Treating Provider:   Dr. Carlin    Date of last office visit: 7/25/2024    Onset of symptoms: This afternoon around 3:00pm     Brief description of symptoms: Patient's wife Comfort called and reported patient has a fever of 101.7, headache and diarrhea. Reported that patient got platelet transfusion this morning and once he got in the car he had chills but no fever. Woke up from a nap this afternoon with a headache and that is when his temperature was checked. Instructed patient's wife to bring him into the ED given that he is neutropenic. She verbalized understanding. Lavonne AREVALO BRANDON updated. RAQUELI page sent to triage pager and message sent to Dr. Carlin.

## 2024-07-25 NOTE — ED PROVIDER NOTES
[place] EMERGENCY DEPARTMENT ([facility])    7/25/24       ED PROVIDER NOTE    History     Chief Complaint   Patient presents with    Fever     HPI  Inocente Romero is a 64 year old male with history of multiple myeloma s/p auto BMT on 7/16/24 (postop day 9 ) who presents to the ED for evaluation of fever.     Due to platelets in the sevens, patient went in for a platelet transfusion this morning.  Shortly after this was completed he started getting chills but did not have a fever at that time.  On the way home developed rigors, went to take a nap, and on waking up felt headache, nausea and chills.  Was found to be febrile to 101.7 at that time.  Now feels a little bit better with less headache, does continue to have some nausea and queasiness in his abdomen that he initially described as pain but then states is more in an easy sensation.  Has been having diarrhea about 2-3 episodes a day for the last month.  This is not significantly changed today.  No shortness of breath or cough.  No dysuria or urinary frequency.  No neck stiffness       Past Medical History  History reviewed. No pertinent past medical history.  Past Surgical History:   Procedure Laterality Date    INSERT CATHETER VASCULAR ACCESS Right 7/8/2024    Procedure: central venous catheter placement, tunneled Insert vascular access;  Surgeon: Arnold Butler MD;  [place] [facility] [place]    IR CVC TUNNEL PLACEMENT > 5 YRS OF AGE  7/8/2024     No current outpatient medications on file.    No Known Allergies  Family History  History reviewed. No pertinent family history.  Social History   Social History     Tobacco Use    Smoking status: Never    Smokeless tobacco: Never      Past medical history, past surgical history, medications, allergies, family history, and social history were reviewed with the patient. No additional pertinent items.     A medically appropriate review of systems was performed with pertinent positives and negatives noted  in the HPI, and all other systems negative.    Physical Exam   BP: 121/71  Pulse: 117  Temp: 99.1  F (37.3  C)  Resp: 18  Height: 182.9 cm (6')  Weight: 106.1 kg (234 lb)  SpO2: 98 %  Physical Exam  GEN: Well appearing, non toxic, cooperative  HEENT: normocephalic and atraumatic, PERRLA, EOMI, normal range of motion of the neck with no nuchal rigidity  CV: well-perfused, normal skin color for ethnicity, sinus tachycardia  PULM: breathing comfortably, in no respiratory distress, clear to auscultation upper and lower lung fields  ABD: nondistended, soft, nontender, nonperitonitic, negative Suarez, negative McBurney point tenderness  EXT: Full range of motion.  No edema.  NEURO: awake, conversant, grossly normal bilateral upper and lower extremity strength & ROM   SKIN: No rashes, ecchymosis, or lacerations  PSYCH: Calm and cooperative, interactive    ED Course, Procedures, & Data      Procedures     Results for orders placed or performed during the hospital encounter of 07/25/24   XR Chest 2 Views     Status: None    Narrative    Exam: XR CHEST 2 VIEWS, 7/25/2024 7:21 PM    Comparison: Radiograph 5/16/2024, 2/12/2024    History: fever, r/o pna    Findings:  PA and lateral views of the chest. Right IJ central venous catheter  tip projects over right cavoatrial junction. Trachea is midline.  Cardiomediastinal silhouette is within normal limits. No focal  consolidation. Minimal streaky bibasilar opacities. There is no  pneumothorax or pleural effusion. No acute osseous abnormality.       Impression    Impression:   No confluent consolidation. Mild streaky bibasilar opacities, favor  atelectasis.    I have personally reviewed the examination and initial interpretation  and I agree with the findings.    CHARLENE RYAN MD         SYSTEM ID:  [id number]   CT Head w/o Contrast     Status: None    Narrative    EXAM: CT HEAD W/O CONTRAST  LOCATION: [facility]  DATE:  7/25/2024    INDICATION: Headache, low platelets. No further details.  COMPARISON: None.  TECHNIQUE: Routine CT Head without IV contrast. Multiplanar reformats. Dose reduction techniques were used.    FINDINGS:  INTRACRANIAL CONTENTS: No intracranial hemorrhage, extraaxial collection, or mass effect.  No CT evidence of acute infarct. Mild presumed chronic small vessel ischemic changes. Mild generalized volume loss. No hydrocephalus.     VISUALIZED ORBITS/SINUSES/MASTOIDS: No intraorbital abnormality. No paranasal sinus mucosal disease. No middle ear or mastoid effusion.    BONES/SOFT TISSUES: No acute abnormality.      Impression    IMPRESSION:  1.  No CT evidence for acute intracranial process.  2.  Brain atrophy and presumed chronic microvascular ischemic changes as above.   Comprehensive metabolic panel     Status: Abnormal   Result Value Ref Range    Sodium 134 (L) 135 - 145 mmol/L    Potassium 3.5 3.4 - 5.3 mmol/L    Carbon Dioxide (CO2) 20 (L) 22 - 29 mmol/L    Anion Gap 12 7 - 15 mmol/L    Urea Nitrogen 11.5 8.0 - 23.0 mg/dL    Creatinine 1.00 0.67 - 1.17 mg/dL    GFR Estimate 85 >60 mL/min/1.73m2    Calcium 8.8 8.8 - 10.4 mg/dL    Chloride 102 98 - 107 mmol/L    Glucose 115 (H) 70 - 99 mg/dL    Alkaline Phosphatase 42 40 - 150 U/L    AST 9 0 - 45 U/L    ALT <5 0 - 70 U/L    Protein Total 6.7 6.4 - 8.3 g/dL    Albumin 3.9 3.5 - 5.2 g/dL    Bilirubin Total 0.4 <=1.2 mg/dL   Procalcitonin     Status: Normal   Result Value Ref Range    Procalcitonin 0.23 <0.50 ng/mL   UA with Microscopic     Status: Abnormal   Result Value Ref Range    Color Urine Light Yellow Colorless, Straw, Light Yellow, Yellow    Appearance Urine Clear Clear    Glucose Urine Negative Negative mg/dL    Bilirubin Urine Negative Negative    Ketones Urine Negative Negative mg/dL    Specific Gravity Urine 1.012 1.003 - 1.035    Blood Urine Trace (A) Negative    pH Urine 5.0 5.0 - 7.0    Protein Albumin Urine Negative Negative mg/dL     Urobilinogen Urine Normal Normal, 2.0 mg/dL    Nitrite Urine Negative Negative    Leukocyte Esterase Urine Negative Negative    Mucus Urine Present (A) None Seen /LPF    RBC Urine <1 <=2 /HPF    WBC Urine 5 <=5 /HPF   Symptomatic Influenza A/B, RSV, & SARS-CoV2 PCR (COVID-19) Nasopharyngeal     Status: Normal    Specimen: Nasopharyngeal; Swab   Result Value Ref Range    Influenza A PCR Negative Negative    Influenza B PCR Negative Negative    RSV PCR Negative Negative    SARS CoV2 PCR Negative Negative    Narrative    Testing was performed using the Xpert Xpress CoV2/Flu/RSV Assay on the [employer] GeneXpert Instrument. This test should be ordered for the detection of SARS-CoV2, influenza, and RSV viruses in individuals with signs and symptoms of respiratory tract infection. This test is for in vitro diagnostic use under the [place] FDA for laboratories certified under CLIA to perform high or moderate complexity testing. This test has been [place] FDA cleared. A negative result does not rule out the presence of PCR inhibitors in the specimen or target RNA in concentration below the limit of detection for the assay. If only one viral target is positive but coinfection with multiple targets is suspected, the sample should be re-tested with another FDA cleared, approved, or authorized test, if coninfection would change clinical management. This test was validated by the [facility] [employer]. These laboratories are certified under the Clinical Laboratory Improvement Amendments of 1988 (CLIA-88) as qualified to perfom high complexity laboratory testing.   CBC with platelets and differential     Status: Abnormal   Result Value Ref Range    WBC Count 0.1 (LL) 4.0 - 11.0 10e3/uL    RBC Count 3.34 (L) 4.40 - 5.90 10e6/uL    Hemoglobin 10.6 (L) 13.3 - 17.7 g/dL    Hematocrit 31.9 (L) 40.0 - 53.0 %    MCV 96 78 - 100 fL    MCH 31.7 26.5 - 33.0 pg    MCHC 33.2 31.5 - 36.5 g/dL    RDW 12.9 10.0 - 15.0 %    Platelet Count 16 (LL)  150 - 450 10e3/uL    % Neutrophils      % Lymphocytes      % Monocytes      % Eosinophils      % Basophils      % Immature Granulocytes      Absolute Neutrophils      Absolute Lymphocytes      Absolute Monocytes      Absolute Eosinophils      Absolute Basophils      Absolute Immature Granulocytes     RBC and Platelet Morphology     Status: None   Result Value Ref Range    Platelet Assessment  Automated Count Confirmed. Platelet morphology is normal.     Automated Count Confirmed. Platelet morphology is normal.    Acanthocytes      Josias Rods      Basophilic Stippling      Bite Cells      Blister Cells      Bairoil Cells      Elliptocytes      Hgb C Crystals      Ramos-Jolly Bodies      Hypersegmented Neutrophils      Polychromasia      RBC agglutination      RBC Fragments      Reactive Lymphocytes      Rouleaux      Sickle Cells      Smudge Cells      Spherocytes      Stomatocytes      Target Cells      Teardrop Cells      Toxic Neutrophils      RBC Morphology Confirmed RBC Indices    Magnesium     Status: Abnormal   Result Value Ref Range    Magnesium 1.5 (L) 1.7 - 2.3 mg/dL   Phosphorus     Status: Normal   Result Value Ref Range    Phosphorus 3.5 2.5 - 4.5 mg/dL   Adult Type and Screen     Status: None   Result Value Ref Range    ABO/RH(D) O POS     Antibody Screen Negative Negative    SPECIMEN EXPIRATION DATE [id number]    MRSA MSSA PCR, Nasal Swab     Status: None    Specimen: Nares, Bilateral; Swab   Result Value Ref Range    MRSA Target DNA Negative Negative    SA Target DNA Negative     Narrative    The [employer]  Xpert SA Nasal Complete assay performed in the [employer]  Dx System is a qualitative in vitro diagnostic test designed for rapid detection of Staphylococcus aureus (SA) and methicillin-resistant Staphylococcus aureus (MRSA) from nasal swabs in patients at risk for nasal colonization. The test utilizes automated real-time polymerase chain reaction (PCR) to detect MRSA/SA DNA. The Xpert SA Nasal  Complete assay is intended to aid in the prevention and control of MRSA/SA infections in healthcare settings. The assay is not intended to diagnose, guide or monitor treatment for MRSA/SA infections, or provide results of susceptibility to methicillin. A negative result does not preclude MRSA/SA nasal colonization.    C. difficile Toxin B PCR with reflex to C. difficile Antigen and Toxins A/B EIA     Status: Normal    Specimen: Per Rectum; Stool   Result Value Ref Range    C Difficile Toxin B by PCR Negative Negative    Narrative    The [employer] Xpert C. difficile Assay, performed on the [employer]  Instrument Systems, is a qualitative in vitro diagnostic test for rapid detection of toxin B gene sequences from unformed (liquid or soft) stool specimens collected from patients suspected of having Clostridioides difficile infection (CDI). The test utilizes automated real-time polymerase chain reaction (PCR) to detect toxin gene sequences associated with toxin producing C. difficile. The Xpert C. difficile Assay is intended as an aid in the diagnosis of CDI.   CBC with platelets differential     Status: Abnormal    Narrative    The following orders were created for panel order CBC with platelets differential.  Procedure                               Abnormality         Status                     ---------                               -----------         ------                     CBC with platelets and d...[[id number]]  Abnormal            Final result               RBC and Platelet Morphology[[id number]]                      Final result                 Please view results for these tests on the individual orders.   ABO/RH Type and Screen  *Canceled*     Status: None ()    Narrative    The following orders were created for panel order ABO/RH Type and Screen .  Procedure                               Abnormality         Status                     ---------                               -----------         ------                        Please view results for these tests on the individual orders.   ABO/RH Type and Screen  *Canceled*     Status: None ()    Narrative    The following orders were created for panel order ABO/RH Type and Screen .  Procedure                               Abnormality         Status                     ---------                               -----------         ------                     Adult Type and Screen[[id number]]                                                         Please view results for these tests on the individual orders.   ABO/Rh type and screen     Status: None    Narrative    The following orders were created for panel order ABO/Rh type and screen.  Procedure                               Abnormality         Status                     ---------                               -----------         ------                     Adult Type and Screen[[id number]]                            Final result                 Please view results for these tests on the individual orders.     Medications   acyclovir (ZOVIRAX) tablet 800 mg (800 mg Oral Not Given 7/25/24 2138)   allopurinol (ZYLOPRIM) tablet 300 mg (has no administration in time range)   amLODIPine (NORVASC) tablet 10 mg (has no administration in time range)   fluconazole (DIFLUCAN) tablet 200 mg (has no administration in time range)   levofloxacin (LEVAQUIN) tablet 250 mg ( Oral Automatically Held 7/29/24 0800)   lisinopril (ZESTRIL) tablet 20 mg (has no administration in time range)   lisinopril (ZESTRIL) tablet 10 mg ( Oral Automatically Held 7/31/24 2000)   metoprolol succinate ER (TOPROL XL) 24 hr tablet 100 mg (100 mg Oral Not Given 7/25/24 2118)   OLANZapine (zyPREXA) tablet 5 mg (5 mg Oral $Given 7/25/24 2139)   ondansetron (ZOFRAN) tablet 8 mg (has no administration in time range)   pantoprazole (PROTONIX) EC tablet 40 mg (has no administration in time range)   acetaminophen (TYLENOL) tablet 325-650 mg (has no administration in time  range)   prochlorperazine (COMPAZINE) injection 5-10 mg (has no administration in time range)     Or   prochlorperazine (COMPAZINE) tablet 5-10 mg (has no administration in time range)   ceFEPIme (MAXIPIME) 2 g vial to attach to  mL bag for ADULTS or NS 50 mL bag for PEDS (has no administration in time range)   magnesium oxide (MAG-OX) half-tab 200 mg (has no administration in time range)   magnesium sulfate 4 g in 100 mL sterile water intermittent infusion (4 g Intravenous $New Bag 7/25/24 2300)   lactated ringers BOLUS 2,000 mL (1,000 mLs Intravenous $New Bag 7/25/24 2140)   ceFEPIme (MAXIPIME) 2 g vial to attach to  mL bag for ADULTS or NS 50 mL bag for PEDS (0 g Intravenous Stopped 7/25/24 2125)   acetaminophen (TYLENOL) tablet 975 mg (975 mg Oral $Given 7/25/24 2053)   potassium chloride sadia ER (KLOR-CON M10) CR tablet 20 mEq (20 mEq Oral $Given 7/25/24 2221)     Labs Ordered and Resulted from Time of ED Arrival to Time of ED Departure   COMPREHENSIVE METABOLIC PANEL - Abnormal       Result Value    Sodium 134 (*)     Potassium 3.5      Carbon Dioxide (CO2) 20 (*)     Anion Gap 12      Urea Nitrogen 11.5      Creatinine 1.00      GFR Estimate 85      Calcium 8.8      Chloride 102      Glucose 115 (*)     Alkaline Phosphatase 42      AST 9      ALT <5      Protein Total 6.7      Albumin 3.9      Bilirubin Total 0.4     ROUTINE UA WITH MICROSCOPIC - Abnormal    Color Urine Light Yellow      Appearance Urine Clear      Glucose Urine Negative      Bilirubin Urine Negative      Ketones Urine Negative      Specific Gravity Urine 1.012      Blood Urine Trace (*)     pH Urine 5.0      Protein Albumin Urine Negative      Urobilinogen Urine Normal      Nitrite Urine Negative      Leukocyte Esterase Urine Negative      Mucus Urine Present (*)     RBC Urine <1      WBC Urine 5     CBC WITH PLATELETS AND DIFFERENTIAL - Abnormal    WBC Count 0.1 (*)     RBC Count 3.34 (*)     Hemoglobin 10.6 (*)     Hematocrit  31.9 (*)     MCV 96      MCH 31.7      MCHC 33.2      RDW 12.9      Platelet Count 16 (*)     % Neutrophils        % Lymphocytes        % Monocytes        % Eosinophils        % Basophils        % Immature Granulocytes        Absolute Neutrophils        Absolute Lymphocytes        Absolute Monocytes        Absolute Eosinophils        Absolute Basophils        Absolute Immature Granulocytes       MAGNESIUM - Abnormal    Magnesium 1.5 (*)    PROCALCITONIN - Normal    Procalcitonin 0.23     INFLUENZA A/B, RSV, & SARS-COV2 PCR - Normal    Influenza A PCR Negative      Influenza B PCR Negative      RSV PCR Negative      SARS CoV2 PCR Negative     PHOSPHORUS - Normal    Phosphorus 3.5     RBC AND PLATELET MORPHOLOGY    Platelet Assessment        Value: Automated Count Confirmed. Platelet morphology is normal.    Acanthocytes        Josias Rods        Basophilic Stippling        Bite Cells        Blister Cells        Greg Cells        Elliptocytes        Hgb C Crystals        Ramos-Jolly Bodies        Hypersegmented Neutrophils        Polychromasia        RBC agglutination        RBC Fragments        Reactive Lymphocytes        Rouleaux        Sickle Cells        Smudge Cells        Spherocytes        Stomatocytes        Target Cells        Teardrop Cells        Toxic Neutrophils        RBC Morphology Confirmed RBC Indices     TYPE AND SCREEN, ADULT    ABO/RH(D) O POS      Antibody Screen Negative      SPECIMEN EXPIRATION DATE [id number]     MRSA MSSA PCR, NASAL SWAB    MRSA Target DNA Negative      SA Target DNA Negative     BLOOD CULTURE   URINE CULTURE   ENTERIC BACTERIA AND VIRUS PANEL BY PCR   VANCOMYCIN RESISTANT ENTEROCOCCUS CULTURE (VRE)     CT Head w/o Contrast   Final Result   IMPRESSION:   1.  No CT evidence for acute intracranial process.   2.  Brain atrophy and presumed chronic microvascular ischemic changes as above.      XR Chest 2 Views   Final Result   Impression:    No confluent consolidation. Mild  streaky bibasilar opacities, favor   atelectasis.      I have personally reviewed the examination and initial interpretation   and I agree with the findings.      CHARLENE RYAN MD            SYSTEM ID:  [id number]             Critical Care Addendum  My initial assessment, based on my review of prehospital provider report, review of nursing observations, review of vital signs, focused history, physical exam, review of cardiac rhythm monitor, 12 lead ECG analysis, and discussion with BMT , established a high suspicion that Inocente Romero has sepsis with indication for early goal-directed therapy, which requires immediate intervention, and therefore he is critically ill.     After the initial assessment, the care team initiated multiple lab tests, initiated IV fluid administration, and initiated medication therapy with see note  to provide stabilization care. Due to the critical nature of this patient, I reassessed nursing observations, vital signs, physical exam, and review of cardiac rhythm monitor multiple times prior to his disposition.     Time also spent performing documentation, reviewing test results, discussion with consultants, and coordination of care.     Critical care time (excluding teaching time and procedures): 40 minutes.       Assessment & Plan    64-year-old male with a past medical history of multiple myeloma postop day 9 from bone marrow transplant presents emergency department today due to rigors, chills, fever and tachycardia in the setting of platelet transfusion this morning as well as a headache which is improving noted to be tachycardic on arrival but otherwise relatively hemodynamically stable.    Primary concern is for possible bacteremia versus reaction to platelets this morning.  With his known neutropenia, empirically treated with sepsis protocol including blood culture (single 1 per protocol in the setting of blood culture shortage), empiric antibiotics with cefepime and  vancomycin, and IV fluids.  No significant red flag symptoms regarding UTI however pending UA and urine culture.  Abdomen is soft and nontender with low suspicion of an acute intra-abdominal process.  Will plan for stool studies in the setting of ongoing diarrhea, however lower suspicion that this is acutely related to his symptoms today as this been going on for a month.  Will plan for admission to the BMT service    I have reviewed the nursing notes. I have reviewed the findings, diagnosis, plan and need for follow up with the patient.    Current Discharge Medication List          Final diagnoses:   Bone marrow transplant status (H)   Rigors   H/O transfusion of platelets   Neutropenic fever  (H24)       Lauren Burr MD   [facility] EMERGENCY DEPARTMENT  7/25/2024     Lauren Burr MD  07/26/24 0011

## 2024-07-25 NOTE — NURSING NOTE
Chief Complaint   Patient presents with    Oncology Clinic Visit     Multiple Myeloma    Blood Draw     Labs drawn via CVC by RN in lab.  VS taken       Labs collected from CVC by RN, line flushed with saline and heparin.  Vitals taken. Pt checked in for appointment(s).    Miryam Mcintyre RN

## 2024-07-25 NOTE — NURSING NOTE
"Oncology Rooming Note    July 25, 2024 7:30 AM   Inocente Romero is a 64 year old male who presents for:    Chief Complaint   Patient presents with    Oncology Clinic Visit     Multiple Myeloma    Blood Draw     Labs drawn via CVC by RN in lab.  VS taken     Initial Vitals: /66   Pulse 94   Temp 98.2  F (36.8  C) (Oral)   Resp 16   Wt 106.5 kg (234 lb 12.8 oz)   SpO2 98%   BMI 32.75 kg/m   Estimated body mass index is 32.75 kg/m  as calculated from the following:    Height as of 7/8/24: 1.803 m (5' 11\").    Weight as of this encounter: 106.5 kg (234 lb 12.8 oz). Body surface area is 2.31 meters squared.  No Pain (0) Comment: Data Unavailable   No LMP for male patient.  Allergies reviewed: Yes  Medications reviewed: Yes    Medications: Medication refills not needed today.  Pharmacy name entered into EPIC: [facility] - [place], [place] - [address]    Frailty Screening:   Is the patient here for a new oncology consult visit in cancer care? 2. No      Clinical concerns:        Sushila Mancera CMA              "

## 2024-07-25 NOTE — PHARMACY-VANCOMYCIN DOSING SERVICE
Pharmacy Vancomycin Initial Note  Date of Service 2024  Patient's  1960  64 year old, male    Indication: Febrile Neutropenia    Current estimated CrCl = Estimated Creatinine Clearance: 112.1 mL/min (based on SCr of 0.86 mg/dL).    Creatinine for last 3 days  2024:  7:02 AM Creatinine 0.86 mg/dL  2024:  6:46 AM Creatinine 0.85 mg/dL  2024:  7:22 AM Creatinine 0.86 mg/dL    Recent Vancomycin Level(s) for last 3 days  No results found for requested labs within last 3 days.      Vancomycin IV Administrations (past 72 hours)        No vancomycin orders with administrations in past 72 hours.                    Nephrotoxins and other renal medications (From now, onward)      Start     Dose/Rate Route Frequency Ordered Stop    24  vancomycin (VANCOCIN) 1,250 mg in 0.9% NaCl 250 mL intermittent infusion         1,250 mg  166.7 mL/hr over 90 Minutes Intravenous EVERY 12 HOURS 24 184              Contrast Orders - past 72 hours (72h ago, onward)      None            Insight[employer] Prediction of Planned Initial Vancomycin Regimen  Loading dose: N/A  Regimen: 1250 mg IV every 12 hours.  Start time: 18:44 on 2024  Exposure target: AUC24 (range)400-600 mg/L.hr   AUC24,ss: 496 mg/L.hr  Probability of AUC24 > 400: 73 %  Ctrough,ss: 15.8 mg/L  Probability of Ctrough,ss > 20: 30 %  Probability of nephrotoxicity (Lodise TAL ): 11 %        Plan:  Start vancomycin  1250 mg IV q12h.   Vancomycin monitoring method: AUC  Vancomycin therapeutic monitoring goal: 400-600 mg*h/L  Pharmacy will check vancomycin levels as appropriate in 1-3 Days.    Serum creatinine levels will be ordered daily for the first week of therapy and at least twice weekly for subsequent weeks.      Bertha Wu, PharmD, BCEMP, BCPS

## 2024-07-25 NOTE — ED TRIAGE NOTES
Chief Complaint: Patient presents to the ED for evaluation of fever, s/p stem cell TxP 7/16, and after receiving platelet transfusion this morning. Patient reports new shivering during and after infusion, and then fever starting around noon with a T-max at home of 101.7    Onset of Symptoms: This morning    Home Remedies: No tylenol    Triage Vital Signs: /71   Pulse 117   Temp 99.1  F (37.3  C) (Oral)   Resp 18   Ht 1.829 m (6')   Wt 106.1 kg (234 lb)   SpO2 98%   BMI 31.74 kg/m      Cal Salinas RN  7/25/2024     Triage Assessment (Adult)       Row Name 07/25/24 5487          Triage Assessment    Airway WDL WDL        Respiratory WDL    Respiratory WDL WDL        Skin Circulation/Temperature WDL    Skin Circulation/Temperature WDL WDL        Cardiac WDL    Cardiac WDL WDL        Peripheral/Neurovascular WDL    Peripheral Neurovascular WDL WDL        Cognitive/Neuro/Behavioral WDL    Cognitive/Neuro/Behavioral WDL WDL

## 2024-07-26 LAB
ACANTHOCYTES BLD QL SMEAR: NORMAL
ADV 40+41 DNA STL QL NAA+NON-PROBE: NEGATIVE
ANION GAP SERPL CALCULATED.3IONS-SCNC: 8 MMOL/L (ref 7–15)
ASTRO TYP 1-8 RNA STL QL NAA+NON-PROBE: NEGATIVE
AUER BODIES BLD QL SMEAR: NORMAL
BASO STIPL BLD QL SMEAR: NORMAL
BASOPHILS # BLD AUTO: ABNORMAL 10*3/UL
BASOPHILS NFR BLD AUTO: ABNORMAL %
BITE CELLS BLD QL SMEAR: NORMAL
BLISTER CELLS BLD QL SMEAR: NORMAL
BUN SERPL-MCNC: 12.8 MG/DL (ref 8–23)
BURR CELLS BLD QL SMEAR: NORMAL
C CAYETANENSIS DNA STL QL NAA+NON-PROBE: NEGATIVE
CALCIUM SERPL-MCNC: 8.4 MG/DL (ref 8.8–10.4)
CAMPYLOBACTER DNA SPEC NAA+PROBE: NEGATIVE
CHLORIDE SERPL-SCNC: 108 MMOL/L (ref 98–107)
CREAT SERPL-MCNC: 0.93 MG/DL (ref 0.67–1.17)
CRYPTOSP DNA STL QL NAA+NON-PROBE: NEGATIVE
DACRYOCYTES BLD QL SMEAR: NORMAL
E COLI O157 DNA STL QL NAA+NON-PROBE: NORMAL
E HISTOLYT DNA STL QL NAA+NON-PROBE: NEGATIVE
EAEC ASTA GENE ISLT QL NAA+PROBE: NEGATIVE
EC STX1+STX2 GENES STL QL NAA+NON-PROBE: NEGATIVE
EGFRCR SERPLBLD CKD-EPI 2021: >90 ML/MIN/1.73M2
ELLIPTOCYTES BLD QL SMEAR: NORMAL
EOSINOPHIL # BLD AUTO: ABNORMAL 10*3/UL
EOSINOPHIL NFR BLD AUTO: ABNORMAL %
EPEC EAE GENE STL QL NAA+NON-PROBE: NEGATIVE
ERYTHROCYTE [DISTWIDTH] IN BLOOD BY AUTOMATED COUNT: 13.1 % (ref 10–15)
ETEC LTA+ST1A+ST1B TOX ST NAA+NON-PROBE: NEGATIVE
FRAGMENTS BLD QL SMEAR: NORMAL
G LAMBLIA DNA STL QL NAA+NON-PROBE: NEGATIVE
GLUCOSE SERPL-MCNC: 113 MG/DL (ref 70–99)
HCO3 SERPL-SCNC: 22 MMOL/L (ref 22–29)
HCT VFR BLD AUTO: 27.8 % (ref 40–53)
HGB BLD-MCNC: 9.3 G/DL (ref 13.3–17.7)
HGB C CRYSTALS: NORMAL
HOWELL-JOLLY BOD BLD QL SMEAR: NORMAL
IMM GRANULOCYTES # BLD: ABNORMAL 10*3/UL
IMM GRANULOCYTES NFR BLD: ABNORMAL %
INR PPP: 1.11 (ref 0.85–1.15)
LACTATE SERPL-SCNC: 1.5 MMOL/L (ref 0.7–2)
LYMPHOCYTES # BLD AUTO: ABNORMAL 10*3/UL
LYMPHOCYTES NFR BLD AUTO: ABNORMAL %
MAGNESIUM SERPL-MCNC: 2.3 MG/DL (ref 1.7–2.3)
MCH RBC QN AUTO: 31.5 PG (ref 26.5–33)
MCHC RBC AUTO-ENTMCNC: 33.5 G/DL (ref 31.5–36.5)
MCV RBC AUTO: 94 FL (ref 78–100)
MONOCYTES # BLD AUTO: ABNORMAL 10*3/UL
MONOCYTES NFR BLD AUTO: ABNORMAL %
NEUTROPHILS # BLD AUTO: ABNORMAL 10*3/UL
NEUTROPHILS NFR BLD AUTO: ABNORMAL %
NEUTS HYPERSEG BLD QL SMEAR: NORMAL
NOROVIRUS GI+II RNA STL QL NAA+NON-PROBE: NEGATIVE
P SHIGELLOIDES DNA STL QL NAA+NON-PROBE: NEGATIVE
PHOSPHATE SERPL-MCNC: 3.7 MG/DL (ref 2.5–4.5)
PLAT MORPH BLD: NORMAL
PLATELET # BLD AUTO: 13 10E3/UL (ref 150–450)
POLYCHROMASIA BLD QL SMEAR: NORMAL
POTASSIUM SERPL-SCNC: 3.8 MMOL/L (ref 3.4–5.3)
RBC # BLD AUTO: 2.95 10E6/UL (ref 4.4–5.9)
RBC AGGLUT BLD QL: NORMAL
RBC MORPH BLD: NORMAL
ROULEAUX BLD QL SMEAR: NORMAL
RVA RNA STL QL NAA+NON-PROBE: NEGATIVE
SALMONELLA SP RPOD STL QL NAA+PROBE: NEGATIVE
SAPO I+II+IV+V RNA STL QL NAA+NON-PROBE: NEGATIVE
SHIGELLA SP+EIEC IPAH ST NAA+NON-PROBE: NEGATIVE
SICKLE CELLS BLD QL SMEAR: NORMAL
SMUDGE CELLS BLD QL SMEAR: NORMAL
SODIUM SERPL-SCNC: 138 MMOL/L (ref 135–145)
SPHEROCYTES BLD QL SMEAR: NORMAL
STOMATOCYTES BLD QL SMEAR: NORMAL
TARGETS BLD QL SMEAR: NORMAL
TOXIC GRANULES BLD QL SMEAR: NORMAL
URATE SERPL-MCNC: 3.6 MG/DL (ref 3.4–7)
V CHOLERAE DNA SPEC QL NAA+PROBE: NEGATIVE
VARIANT LYMPHS BLD QL SMEAR: NORMAL
VIBRIO DNA SPEC NAA+PROBE: NEGATIVE
WBC # BLD AUTO: 0.2 10E3/UL (ref 4–11)
Y ENTEROCOL DNA STL QL NAA+PROBE: NEGATIVE

## 2024-07-26 PROCEDURE — 999N000111 HC STATISTIC OT IP EVAL DEFER

## 2024-07-26 PROCEDURE — 999N000147 HC STATISTIC PT IP EVAL DEFER

## 2024-07-26 PROCEDURE — 99233 SBSQ HOSP IP/OBS HIGH 50: CPT | Mod: FS

## 2024-07-26 PROCEDURE — 84100 ASSAY OF PHOSPHORUS: CPT | Performed by: STUDENT IN AN ORGANIZED HEALTH CARE EDUCATION/TRAINING PROGRAM

## 2024-07-26 PROCEDURE — 250N000013 HC RX MED GY IP 250 OP 250 PS 637: Performed by: INTERNAL MEDICINE

## 2024-07-26 PROCEDURE — 83735 ASSAY OF MAGNESIUM: CPT | Performed by: STUDENT IN AN ORGANIZED HEALTH CARE EDUCATION/TRAINING PROGRAM

## 2024-07-26 PROCEDURE — 80048 BASIC METABOLIC PNL TOTAL CA: CPT | Performed by: INTERNAL MEDICINE

## 2024-07-26 PROCEDURE — 99418 PROLNG IP/OBS E/M EA 15 MIN: CPT

## 2024-07-26 PROCEDURE — 250N000011 HC RX IP 250 OP 636: Performed by: INTERNAL MEDICINE

## 2024-07-26 PROCEDURE — 84550 ASSAY OF BLOOD/URIC ACID: CPT | Performed by: INTERNAL MEDICINE

## 2024-07-26 PROCEDURE — 85610 PROTHROMBIN TIME: CPT | Performed by: INTERNAL MEDICINE

## 2024-07-26 PROCEDURE — 250N000013 HC RX MED GY IP 250 OP 250 PS 637: Performed by: STUDENT IN AN ORGANIZED HEALTH CARE EDUCATION/TRAINING PROGRAM

## 2024-07-26 PROCEDURE — 87040 BLOOD CULTURE FOR BACTERIA: CPT | Performed by: INTERNAL MEDICINE

## 2024-07-26 PROCEDURE — 85027 COMPLETE CBC AUTOMATED: CPT | Performed by: INTERNAL MEDICINE

## 2024-07-26 PROCEDURE — 83605 ASSAY OF LACTIC ACID: CPT

## 2024-07-26 PROCEDURE — 250N000011 HC RX IP 250 OP 636: Performed by: STUDENT IN AN ORGANIZED HEALTH CARE EDUCATION/TRAINING PROGRAM

## 2024-07-26 PROCEDURE — 250N000013 HC RX MED GY IP 250 OP 250 PS 637: Performed by: PHYSICIAN ASSISTANT

## 2024-07-26 PROCEDURE — 250N000011 HC RX IP 250 OP 636: Performed by: PHYSICIAN ASSISTANT

## 2024-07-26 PROCEDURE — 206N000001 HC R&B BMT UMMC

## 2024-07-26 RX ORDER — POTASSIUM CHLORIDE 750 MG/1
20 TABLET, EXTENDED RELEASE ORAL ONCE
Status: COMPLETED | OUTPATIENT
Start: 2024-07-26 | End: 2024-07-26

## 2024-07-26 RX ORDER — LOPERAMIDE HCL 2 MG
4 CAPSULE ORAL 4 TIMES DAILY
Status: DISCONTINUED | OUTPATIENT
Start: 2024-07-26 | End: 2024-07-29

## 2024-07-26 RX ORDER — PROCHLORPERAZINE MALEATE 5 MG
5 TABLET ORAL EVERY 6 HOURS PRN
Status: DISCONTINUED | OUTPATIENT
Start: 2024-07-26 | End: 2024-07-30 | Stop reason: HOSPADM

## 2024-07-26 RX ORDER — HEPARIN SODIUM,PORCINE 10 UNIT/ML
5 VIAL (ML) INTRAVENOUS
Status: DISCONTINUED | OUTPATIENT
Start: 2024-07-26 | End: 2024-07-30 | Stop reason: HOSPADM

## 2024-07-26 RX ADMIN — OLANZAPINE 5 MG: 2.5 TABLET, FILM COATED ORAL at 22:02

## 2024-07-26 RX ADMIN — CEFEPIME HYDROCHLORIDE 2 G: 2 INJECTION, POWDER, FOR SOLUTION INTRAVENOUS at 13:26

## 2024-07-26 RX ADMIN — ACYCLOVIR 800 MG: 800 TABLET ORAL at 19:37

## 2024-07-26 RX ADMIN — ACETAMINOPHEN 650 MG: 325 TABLET, FILM COATED ORAL at 15:31

## 2024-07-26 RX ADMIN — LOPERAMIDE HYDROCHLORIDE 4 MG: 2 CAPSULE ORAL at 15:29

## 2024-07-26 RX ADMIN — Medication 5 ML: at 04:56

## 2024-07-26 RX ADMIN — ONDANSETRON HYDROCHLORIDE 8 MG: 8 TABLET, FILM COATED ORAL at 09:08

## 2024-07-26 RX ADMIN — CEFEPIME HYDROCHLORIDE 2 G: 2 INJECTION, POWDER, FOR SOLUTION INTRAVENOUS at 04:54

## 2024-07-26 RX ADMIN — Medication 200 MG: at 12:16

## 2024-07-26 RX ADMIN — PANTOPRAZOLE SODIUM 40 MG: 40 TABLET, DELAYED RELEASE ORAL at 08:58

## 2024-07-26 RX ADMIN — Medication 5 ML: at 23:36

## 2024-07-26 RX ADMIN — LOPERAMIDE HYDROCHLORIDE 4 MG: 2 CAPSULE ORAL at 13:26

## 2024-07-26 RX ADMIN — ACYCLOVIR 800 MG: 800 TABLET ORAL at 08:57

## 2024-07-26 RX ADMIN — LOPERAMIDE HYDROCHLORIDE 4 MG: 2 CAPSULE ORAL at 19:37

## 2024-07-26 RX ADMIN — Medication 5 ML: at 06:01

## 2024-07-26 RX ADMIN — LISINOPRIL 20 MG: 10 TABLET ORAL at 08:57

## 2024-07-26 RX ADMIN — FILGRASTIM-AAFI 480 MCG: 480 INJECTION, SOLUTION INTRAVENOUS; SUBCUTANEOUS at 19:42

## 2024-07-26 RX ADMIN — POTASSIUM CHLORIDE 20 MEQ: 750 TABLET, EXTENDED RELEASE ORAL at 08:56

## 2024-07-26 RX ADMIN — ACETAMINOPHEN 650 MG: 325 TABLET, FILM COATED ORAL at 08:57

## 2024-07-26 RX ADMIN — CEFEPIME HYDROCHLORIDE 2 G: 2 INJECTION, POWDER, FOR SOLUTION INTRAVENOUS at 22:02

## 2024-07-26 RX ADMIN — FLUCONAZOLE 200 MG: 200 TABLET ORAL at 08:57

## 2024-07-26 RX ADMIN — ACETAMINOPHEN 650 MG: 325 TABLET, FILM COATED ORAL at 23:43

## 2024-07-26 RX ADMIN — AMLODIPINE BESYLATE 10 MG: 10 TABLET ORAL at 08:57

## 2024-07-26 RX ADMIN — METOPROLOL SUCCINATE 100 MG: 50 TABLET, EXTENDED RELEASE ORAL at 19:37

## 2024-07-26 ASSESSMENT — ACTIVITIES OF DAILY LIVING (ADL)
ADLS_ACUITY_SCORE: 18

## 2024-07-26 NOTE — CONSULTS
Assessment completed in BMT Clinic on 5/14/2024, please see info below for inpatient review.    Blood and Marrow Transplant   Psychosocial Assessment with   Clinical      Assessment completed on 5/14/2024 of Pt's living situation, support system, financial status, functional status, coping, stressors, need for resources and social work intervention provided as needed.  Information for this assessment was provided by Pt and Pts Spouse Comfort report in addition to medical chart review and consultation with medical team.      Present at Assessment:   Patient: Felix Romero  Spouse: Comfort Romero  : ANALI Thurman LGSW     Diagnosis: Multiple Myeloma (MM)      Date of Diagnosis: 8/2023     Transplant type: OP Autologous     Donor: Autologous      Physician: Gianfranco Carlin MD     Nurse Coordinator: Ileana Cotton RN     Social Workers: ANALI Thurman LGSW     Permanent Address:   [address] [place] [address]  (35 miles from clinic/hospital)     Living Situation: Pt lives in [place], [place] with spouse Comfort and 2 children (Song-13 and Charli-9)     Local Address:   [address] [place] [address]  (35 miles from clinic/hospital)     Contact Information:  Pt's Cell Phone: [phone]  Pt Email: [email or web address]  Pt's Spouse Comfort's Phone: [phone]  Pt's Spouse Comfort's Email: [email or web address]     Of Note: Pt requests any email communication to be sent to both his and spouse's email.      Presenting Information:  Felix is a 64 year old male diagnosed with Multiple Myeloma who presents for evaluation for OP Autologous transplant at the [facility] ([facility]). Pt was accompanied to today's visit by Spouse Comfort.      Decision Making: Self     Health Care Directive: Pt declined a completed Health Care Directive (HCD) at this time. SW previously provided pt with a copy of HCD and encouraged Pt to have discussion with  family members and complete form. SW previously provided education and forms. SW encouraged pt to have discussions with their family regarding their health care wishes. SW explained that since he does not have a healthcare directive, legally pts spouse would make decisions on pts behalf, if he did not have capacity to make his own medical decisions. Pt understood. Pts Spouse Comfort shared that they are in the process of completing the health care directive and will plan to bring the document into the clinic when completed.      Relationship Status: . Pt and pt's spouse have been  for 12 years. Pt described relationship as stable/supportive.      Special Needs: None identified at this time.      Family/Support System: Pt endorsed a strong support system including family and close friends who will be available to support pt throughout transplant process.      Family Information:  Spouse: Comfort Romero  Children: Malik-37 ([place], [place]); Constantin-31 ([place], [place]); Song-13; Charli-9   Siblings: Sister Abiola Sharp-61 ([place], [place]) and Sister Rosibel-54 ([place])   Parents: Mother Mary Jane and Father Inocente (travel to/from [place] 6 months out of the year)   Grandchildren: N/A  Friends: Pt endorsed a good friend support system.     Caregiver: SW discussed with pt and pt's spouse the caregiver role and expectation at length. Pt is agreeable to having a full time caregiver for a minimum of 30 days until cleared by the BMT physician. Pt and pt's spouse confirmed understanding of the caregiver requirement. Pt's primary caregiver will be Spouse Comfort. Pt reviewed and signed the caregiver contract which will be scanned into the EMR. Caregiver education and resources provided. No caregiver concerns identified.      Caregiver Contact Information:  Pt's Spouse Comfort's Phone: [phone]  Pt's Spouse Comfort's Email: [email or web address]     Transportation Mode: Private vehicle to be primary source of transportation  anticipated. Pt is aware of driving restrictions post-BMT and the need for the caregiver is to drive until cleared to drive by the BMT physician. SW provided information on parking info and monthly parking pass options.     Insurance: Pt has [facility] CCN health insurance. Pt denied specific insurance concerns at this time. SW reiterated information about the [employer]  should specific insurance questions arise as pt moves through transplant process.      Of Note: Pt served from [id number] in the [employer]-stationed in [place] and [facility]. Pt shared that [facility] and is currently in the process to increase the percentage of benefits. Pt/Spouse shared that the [facility] can provide transportation reimbursement for parking related costs.       Sources of Income: Pt's source of income is SSDI and salary/wages from spouse's employment. No financial concerns identified at this time. SW discussed carline options and asked pt to let SW know if they would like to apply in the future.      Of Note: Pt received his first SSDI benefit check last month in the amount of $902.  Pt also shared that he receives approximately $300 for financial assistance for his children via SSDI.      Employment:   Currently employed: No  Previous Employer: [employer]  Occupation: [employer] Needs Transport      Spouse/Partner Employed: Yes-F/T  Occupation: Human Resources  *Can work remotely-place of employment is approximately a mile from residence.      Mental Health: Pt denied a history of mental health concerns, specific diagnoses or medications at this time. We discussed how many patients may see an increase in feelings of anxiety or depression while hospitalized for extended periods of time and pursue  isolation precautions post-BMT. Encouraged pt and spouse to let us know if they are noticing an increase in symptoms. Pt believes he would be able to identify symptoms of  "depression/anxiety throughout the transplant process. We talked about the variety of modalities available to use as coping mechanisms (including but not limited to guided imagery, relaxation techniques, progressive muscle relaxation, counseling/talk therapy and medication).     PHQ-9:  Pt scored a 0 which indicates none on the depression severity scale. Pt endorses this is an accurate reflection of his emotional state.     GAD7:  Pt scored a 0 which indicates no sign of anxiety on the Generalized Anxiety Disorder Questionnaire. Pt endorses this is an accurate reflection of his emotional state.     Chemical Use:   Tobacco: No hx reported.  Alcohol: No hx reported.  Marijuana: No hx reported.  Other Drugs: No hx reported.  Based on the information provided, there appear to be no specific risks or concerns identified at this time.      Trauma/Loss/Abuse History: Multiple losses associated with cancer diagnosis and treatment, including health, employment, changes to physical appearance, etc.      Spirituality: Pt identified as Nondenominational. However, is not currently an active participant. SW explained that there are Chaplains on the unit and pt can request to meet with a  at anytime.      Coping: Pt noted that he is currently feeling \"positive and hopeful\". Pt shared that his main coping mechanisms are \"talking with friends/family and watching videos\". Pt noted that he enjoys \"playing tennis, watching sports, watching movies, travel, listening to the radio, and play with his children\". SW and pt discussed additional positive coping mechanisms that pt can utilize while in the hospital. While hospitalized, pt plans to \"bring his laptop\".      Caregiver Coping: Pt's spouse noted that she is feeling \"positive, fearful, sad, hopeful, worried, prepared, nervous, frustrated, ready to begin, and less interested in usual activities\" at this time. Pt's spouse noted that she jonny by \"talking with friends/family, " prayer/spiritual practices, exercise, smoking, and gathering education information about cancer diagnosis. Caregiver resources offered/reviewed.      Education Provided: Transplant process expectations, Caregiver requirements, Caregiver self-care, Financial issues related to transplant, Financial resources/grants available, Common psychosocial stressors pre/post transplant, Support group(s) available, Hospital resources available, Web site information, Social Work role and Resources for children/siblings     Interventions Provided: Psychosocial Support and Education      Assessment and Recommendations for Team:  Pt is a 64 year old male diagnosed with MM who is here undergoing preparation for a planned OP Autologous transplant.      Pt is engaged and pleasant, calm and able to articulate concerns/coping mechanisms in an appropriate manner. During our meeting pt was alert, he was interactive, affect was full, he displayed appropriate eye contact, memory and thought processes. Pt feels comfortable communicating with the medical team. Pt has a strong supportive network of family and friends who are involved. Pt has developed strong coping mechanisms.      Pt will benefit from ongoing psychosocial support in regards to coping with the adjustment to the BMT process. CSW has discussed  psychosocial support options in regards to coping with the adjustment to the BMT process and support groups opportunities.       Pt has a strong support system and a confirmed caregiver plan. Pt verbalizes understanding of the transplant process and wanting to proceed. SW provided contact information and encouraged pt to contact SW with questions, concerns, resources and for support.     Per this assessment, SW did not identify any barriers to this patient moving forward with transplant.     Important Information:   -If hospitalized for an extended period of time, pt would appreciate a treadmill in his room.     Follow up Planned:    Psychosocial support     ANALI Thurman, [facility]  Adult Blood & Marrow Transplant   Phone: [phone]  Twijector SEARCHABLE: BMT SW #4  Securely message with RoomClip   Support Groups at [facility]: Social Work Services for Cancer Patients ([email or web address])

## 2024-07-26 NOTE — PROGRESS NOTES
BMT/Cell Therapy Daily Progress Note   07/26/2024    Patient ID:  Inocente Romero is a 64 year old male, hx of MM. Today is D + 10 s/p HD melphalan and auto HSCT.    Admitted 7/25 for F/N    INTERVAL  HISTORY     Felix noticed chills during plt transfusion yesterday in clinic.  He went home.  While sleeeping, his wife took his temp and noticed a fever of 100.7F.  They presented to ED, and he is admitted now.  Today, he feels ok.  Note of temp o/n.  Stool remains loose.  Has baseline 'lowlevel' nausea but PO intake remains good.  No cough, SOB, line pain, dysuria.        Review of Systems: 10 point ROS negative except as noted above.      PHYSICAL EXAM   /75 (BP Location: Left arm)   Pulse 109   Temp (!) 100.9  F (38.3  C) (Oral)   Resp 20   Ht 1.829 m (6')   Wt 105.1 kg (231 lb 12.8 oz)   SpO2 96%   BMI 31.44 kg/m      Weight In/Out     Wt Readings from Last 3 Encounters:   07/26/24 105.1 kg (231 lb 12.8 oz)   07/25/24 106.5 kg (234 lb 12.8 oz)   07/24/24 105.1 kg (231 lb 9.6 oz)      [unfilled]     General: NAD   Eyes: sclera anicteric    Lungs: CTAB  Cardiovascular: RRR, no M/R/G  Lymphatics: no edema  Skin: no rashes or petechiae  Neuro: A&O   Additional Findings: clean and dry    LABS AND IMAGING: I have assessed all abnormal lab values for their clinical significance and any values considered clinically significant have been addressed in the assessment and plan.        Lab Results   Component Value Date    WBC 0.2 (LL) 07/26/2024    ANEU 6.0 07/21/2024    HGB 9.3 (L) 07/26/2024    HCT 27.8 (L) 07/26/2024    PLT 13 (LL) 07/26/2024     07/26/2024    POTASSIUM 3.8 07/26/2024    CHLORIDE 108 (H) 07/26/2024    CO2 22 07/26/2024     (H) 07/26/2024    BUN 12.8 07/26/2024    CR 0.93 07/26/2024    MAG 2.3 07/26/2024    INR 1.11 07/26/2024       SYSTEMS-BASED ASSESSMENT AND PLAN     BMT/IEC PROTOCOL for MM Auto PBSCT  Transplants for multiple myeloma:  The patient has Standard risk MM;  collection goal for 2 transplants.  Pt collected 9.84x10^06 CD34/kg     7/15 - D-1: 200mgm/2 Melphalan.   7/16: Transplant day 0. Thawed cell dose 4.92 x10^6 Cd34/kg.   - post thaw/wash cell dose 3.77x10^6 CD34/kg.    Uric acid 3.6, stop allopurinol    Curently d+ 10 s/p auto SCT      HEME/COAG  - Transfusion parameters: hgb <7g/dL and plts <10,000. Platelets 7/25  - daily GCSF ongoing until ANC >2500 x 2d    ID  - F/N:  continue cefepime; bld cx NGTD, UA bland; Ucx P; CXR neg.  PPx: Fluc, ACV     GI  CINV ppx: zofran/compazine prn. Zypreza at bedtime.  CI diarrhea: c diff neg (7/25): schedule imodium    CV:   - Continue Norvasc, metoprolol. Watching BP closely.    FEN/Renal:  - noted on KCL 10meq BID.     Medically Ready for Discharge: Anticipated in 2-4 Days    Clinically Significant Risk Factors Present on Admission          # Hypocalcemia: Lowest Ca = 8.4 mg/dL in last 2 days, will monitor and replace as appropriate   # Hypomagnesemia: Lowest Mg = 1.5 mg/dL in last 2 days, will replace as needed    # Drug Induced Coagulation Defect: home medication list includes an anticoagulant medication  # Thrombocytopenia: Lowest platelets = 7 in last 2 days, will monitor for bleeding   # Hypertension: Home medication list includes antihypertensive(s)    # Anemia: based on hgb <11       # Obesity: Estimated body mass index is 31.44 kg/m  as calculated from the following:    Height as of this encounter: 1.829 m (6').    Weight as of this encounter: 105.1 kg (231 lb 12.8 oz).                I spent 40 minutes in the care of this patient today, which included time necessary for preparation for the visit, obtaining history, ordering medications/tests/procedures as medically indicated, review of pertinent medical literature, counseling of the patient, communication of recommendations to the care team, and documentation time.    The longitudinal plan of care for the diagnosis(es)/condition(s) as documented were addressed during  this visit. Due to the added complexity in care, I will continue to support Felix in the subsequent management and with ongoing continuity of care.    Rosibel Cerda PA-C

## 2024-07-26 NOTE — H&P
Physician Attestation   I, Bala Lee MD, was present with the medical/BRANDON student who participated in the service and in the documentation of the note.  I have verified the history and personally performed the physical exam and medical decision making.  I agree with the assessment and plan of care as documented in the note.      Franco findings: Mr. Romero is 65 y/o M with hx of MM s/p PBSCT recently here with neutropenic fever. Denies any localizing signs. He did have platelet transfusion shortly before he developed fever. UA negative, CXR with some atelectasis. Does have ongoing diarrhea, no change. He is febrile to 100.7 but HDS. Labs unremarkable except for pancytopenia. He was started on vanc/cefepime. Stool studies sent. Will continue cefepime for now.     75 MINUTES SPENT BY ME on the date of service doing chart review, history, exam, documentation & further activities per the note.    I have personally reviewed the following data over the past 24 hrs:    0.1 (LL)  \   10.6 (L)   / 16 (LL)     134 (L) 102 11.5 /  115 (H)   3.5 20 (L) 1.00 \     ALT: <5 AST: 9 AP: 42 TBILI: 0.4   ALB: 3.9 TOT PROTEIN: 6.7 LIPASE: N/A     Procal: 0.23 CRP: N/A Lactic Acid: N/A           Bala Lee MD  Date of Service (when I saw the patient): 07/25/24    [facility]    History and Physical - Hospitalist Service       Date of Admission:  7/25/2024    Assessment & Plan      Inocente Romero is a 64 year old male with PMHx of MM s/p auto HSCT who is admitted on 7/25/2024 for evaluation and management of non-neutropenic fever.       ##Multiple Myeloma s/p auto PBSCT  ##Neutropenic fever  Patient reported new onset of fever (Tmax 101.7F) with chills 2-3 hours after platelet transfusion on 7/25 0900. He had his last transplant ~10 days ago. He reports having a similar febrile reaction to platelet transfusion on 07/2023 with the workup revealing his diagnosis of multiple  myeloma. He received bolus 2L LR on admission. UA negative with trace blood. UA and blood cultures pending. CXR negative and CT head with no acute intracranial process. Differential includes neutropenic fever in the context of immunosuppression and platelet transfusion reaction given the timing of fever and chills. Patient did not get pre-treated with tylenol and diphenhydramine prior to transfusion.  - Follow blood and urine cultures.  - Started on cefepime 2g IV (7/25-xx).  - Acetaminophen 1g (7/25), one dose.  - Prophylaxis:   - Acyclovir   - Hold Levofloxacin 250mg PO daily.   - Fluconazole 200mg PO daily.    #Pancytopenia in the context of chronic illness  On admission, WBC 0.1, Hgb 10.6, platelets 16.  - Transfuse if Hgb <7.  - Transfuse if platelets <10k.    #Mild hyponatremia  On admission Na 134. Could be in the context of dehydration.  - Recheck BMP with morning labs.    ##Benign essential HTN  - PTA on metoprolol 100mg PO daily.  - Hold PTA on lisinopril.  - PTA amlodipine 10mg PO    ##Nausea  - PTA ondansetron 8mg tab  - PTA prochlorperazone 5mg    #Insomnia  - Hold PTA olanzapine 5mg at HS    #GERD  Takes TUMS from time to time.  - PTA pantoprazole    #weakness  Reports weakness at baseline that started with BMT treatment.  - PT/OT consult.         Diet: Snacks/Supplements Adult: Other - Please comment; Between Meals  High Kcal/High Protein Diet, ADULT  DVT Prophylaxis: Pneumatic Compression Devices  Benson Catheter: Not present  Fluids: None  Lines: PRESENT             Cardiac Monitoring: None  Code Status: Full Code    Clinically Significant Risk Factors Present on Admission               # Drug Induced Coagulation Defect: home medication list includes an anticoagulant medication  # Thrombocytopenia: Lowest platelets = 7 in last 2 days, will monitor for bleeding   # Hypertension: Home medication list includes antihypertensive(s)    # Anemia: based on hgb <11       # Obesity: Estimated body mass index  is 31.74 kg/m  as calculated from the following:    Height as of this encounter: 1.829 m (6').    Weight as of this encounter: 106.1 kg (234 lb).              Disposition Plan      Expected Discharge Date: 07/29/2024                The patient's care was discussed with the Attending Physician, Dr. Bala Lee .      Stephon Lay  Medical Student  Hospitalist Service  [facility]  Securely message with [employer] (more info)  Text page via [facility] Paging/Directory   ______________________________________________________________________    Chief Complaint   Fever    History is obtained from the patient and patient's wife in the hospital lobby.    History of Present Illness   Inocente Romero is a 64 year old male who presents with new onset of fever (Tmax 101.7F) accompanied by chills 2-3 hours after receiving platelet transfusion at [facility] [place] 7/25 around 0900. He also has a mild headache rated 2/10 around right periorbital area that comes and goes, which also started today 7/25. He denies any changes in vision, difficulty swallowing, cough, chest pain, shortness of breath, heart palpitations though he did feel like his heart rate was beating too fast in the afternoon (110s per patient's wife). He reports weakness in his body, which is at baseline and which started after BMT therapy. He is able to walk on his own without assistance. He denies any dizziness. Has tinnitus at baseline. He has abdominal cramping, which is at his baseline and a chronic complaint over the past several years. He denies any constipation and diarrhea. Last BM was 7/25 AM.      Past Medical History    History reviewed. No pertinent past medical history.    Past Surgical History   Past Surgical History:   Procedure Laterality Date    INSERT CATHETER VASCULAR ACCESS Right 7/8/2024    Procedure: central venous catheter placement, tunneled Insert vascular access;  Surgeon: Arnold Butler MD;  Location:  [facility] OR    IR CVC TUNNEL PLACEMENT > 5 YRS OF AGE  7/8/2024       Prior to Admission Medications   Prior to Admission Medications   Prescriptions Last Dose Informant Patient Reported? Taking?   Cyanocobalamin 1000 MCG/ML KIT Unknown  Yes Yes   Sig: Inject 1,000 mcg as directed every 30 days   OLANZapine (ZYPREXA) 2.5 MG tablet 7/24/2024  No Yes   Sig: Take 2 tablets (5 mg) by mouth at bedtime   acyclovir (ZOVIRAX) 800 MG tablet 7/25/2024  No Yes   Sig: Take 1 tablet (800 mg) by mouth 2 times daily Start Day -1   allopurinol (ZYLOPRIM) 300 MG tablet 7/25/2024  No Yes   Sig: Take 1 tablet (300 mg) by mouth daily Start Day -1   amLODIPine (NORVASC) 10 MG tablet 7/25/2024  No Yes   Sig: Take 1 tablet (10 mg) by mouth daily   fluconazole (DIFLUCAN) 200 MG tablet 7/25/2024  No Yes   Sig: Take 1 tablet (200 mg) by mouth daily Start Day -1   heparin lock flush 10 unit/mL Unknown  No Yes   Sig: Inject 5 mLs as needed for line flush (Inject 5mLs into each lumen daily)   levofloxacin (LEVAQUIN) 250 MG tablet 7/25/2024  No Yes   Sig: Take 1 tablet (250 mg) by mouth daily Start Day -1   lisinopril (ZESTRIL) 10 MG tablet 7/24/2024  Yes Yes   Sig: Take 10 mg by mouth every evening   lisinopril (ZESTRIL) 20 MG tablet 7/25/2024  Yes Yes   Sig: Take 20 mg by mouth every morning   magnesium 200 MG TABS 7/25/2024  Yes Yes   Sig: Take 200 mg by mouth daily   metoprolol succinate ER (TOPROL XL) 100 MG 24 hr tablet 7/24/2024  Yes Yes   Sig: Take 100 mg by mouth every evening   ondansetron (ZOFRAN) 8 MG tablet 7/24/2024  No Yes   Sig: Take 1 tablet (8 mg) by mouth every 8 hours as needed (for nausea / vomiting)   pantoprazole (PROTONIX) 40 MG EC tablet 7/25/2024  No Yes   Sig: Take 1 tablet (40 mg) by mouth daily Start Day -1   potassium chloride ER (K-TAB/KLOR-CON) 10 MEQ CR tablet 7/25/2024  Yes Yes   Sig: Take 10 mEq by mouth 2 times daily   prochlorperazine (COMPAZINE) 5 MG tablet Unknown  No Yes   Sig: Take 1-2 tablets (5-10 mg) by  mouth every 6 hours as needed for nausea or vomiting      Facility-Administered Medications: None        Review of Systems    The 10 point Review of Systems is negative other than noted in the HPI or here.    Allergies   No Known Allergies     Physical Exam   Vital Signs: Temp: (!) 100.7  F (38.2  C) Temp src: Oral BP: 121/71 Pulse: 109   Resp: 18 SpO2: 100 % O2 Device: None (Room air)    Weight: 234 lbs 0 oz    Constitutional: sitting up on a chair in the ED lobby wearing a blanket around him and N95 mask, awake, alert, cooperative, in no apparent distress.  Eyes: Lids and lashes normal, extra ocular muscles intact, sclera clear, conjunctiva normal  Respiratory: No increased work of breathing on room air, good air exchange, clear to auscultation bilaterally, no crackles or wheezing.  Cardiovascular: mildly tachycardic with regular rhythm, normal S1 and S2, did not appreciate any rubs, murmurs, or gallops.  GI: obese abdomen, normal bowel sounds, soft, non-distended, non-tender.  Skin: no bruising or bleeding, normal skin color, texture, turgor, no redness, warmth, or swelling, no rashes, and no lesions on exposed skin. No lower extremity peripheral edema. Skin is dry and warm.  Musculoskeletal: There is no redness, warmth, or swelling of the joints. Tone is normal. Moves all extremities spontaneously.  Neurologic: Awake, alert, oriented to name, place and time.    Medical Decision Making       Please see A&P for additional details of medical decision making.      Data     I have personally reviewed the following data over the past 24 hrs:    0.1 (LL)  \   10.6 (L)   / 16 (LL)     134 (L) 102 11.5 /  115 (H)   3.5 20 (L) 1.00 \     ALT: <5 AST: 9 AP: 42 TBILI: 0.4   ALB: 3.9 TOT PROTEIN: 6.7 LIPASE: N/A     Procal: 0.23 CRP: N/A Lactic Acid: N/A         Imaging results reviewed over the past 24 hrs:   Recent Results (from the past 24 hour(s))   XR Chest 2 Views    Impression    RESIDENT PRELIMINARY  INTERPRETATION  Impression:   No focal consolidation. Minimal streaky bibasilar opacities, likely  atelectasis.   CT Head w/o Contrast    Narrative    EXAM: CT HEAD W/O CONTRAST  LOCATION: [facility]  DATE: 7/25/2024    INDICATION: Headache, low platelets. No further details.  COMPARISON: None.  TECHNIQUE: Routine CT Head without IV contrast. Multiplanar reformats. Dose reduction techniques were used.    FINDINGS:  INTRACRANIAL CONTENTS: No intracranial hemorrhage, extraaxial collection, or mass effect.  No CT evidence of acute infarct. Mild presumed chronic small vessel ischemic changes. Mild generalized volume loss. No hydrocephalus.     VISUALIZED ORBITS/SINUSES/MASTOIDS: No intraorbital abnormality. No paranasal sinus mucosal disease. No middle ear or mastoid effusion.    BONES/SOFT TISSUES: No acute abnormality.      Impression    IMPRESSION:  1.  No CT evidence for acute intracranial process.  2.  Brain atrophy and presumed chronic microvascular ischemic changes as above.

## 2024-07-26 NOTE — PROGRESS NOTES
Patient admitted to: Unit 5c  Admitted from: ED  Arrived by: Wheelchair  Reason for admission: Fever  Patient accompanied by: ED Tech  Belongings: Kept with pt.-see flowsheet  Teaching: Orientation to room included use of the call light, bed controls, TV and phone, bathrooms, unit routines, nursing routines, assessment needs, and visitation hour.    Skin double check completed by: Alexander Godfrey RN and Car Fisher RN. No skin concerns.

## 2024-07-26 NOTE — PLAN OF CARE
Physical Therapy: Orders received. Chart reviewed and discussed with care team.? Physical Therapy not indicated due to per discussion with OT; pt mobilizing and maintaining IND activity levels; no acute PT needs identified.? Defer discharge recommendations to care team.? Will complete orders.

## 2024-07-26 NOTE — PLAN OF CARE
Goal Outcome Evaluation:       No complaints of pain. AM nausea resolved with po zofran. Good appetite. Ambulating independently. Fever (2nd) in am reported to Rosibel MEMBRENO and cultures obtained, already on cefepime IV. Then fever again at 1600 reported to Dr. Lee with no further workup or orders. Tylenol given. Otherwise VSS with mild tachycardia. /68 (BP Location: Left arm)   Pulse 115   Temp (!) 100.9  F (38.3  C)   Resp 20   Ht 1.829 m (6')   Wt 105.1 kg (231 lb 12.8 oz)   SpO2 94%   BMI 31.44 kg/m             Problem: Adult Inpatient Plan of Care  Goal: Optimal Comfort and Wellbeing  Outcome: Progressing     Problem: Fever with Neutropenia  Goal: Baseline Body Temperature  Outcome: Progressing  Goal: Absence of Infection  Outcome: Progressing

## 2024-07-26 NOTE — PROGRESS NOTES
Attending/attestation note.    07/26/2024     Incoente Romero  was seen and evaluated today as part of a shared APRN/PA visit. I reviewed today s vital signs, labs, imaging, notes, and other studies.   My key exam findings include:   63yo male with MM now 10 days after autologous HSCT  admitted with neutropenic fever.    Chills but better this am. Started Cefepim.  No localizing sy's.   CXR neg, blood cx neg. COVID PCRneg.   On Cefepim/Vanco.    Key management decisions made by me and carried out under my direction include:   Neutropenic fever without focus. Cont Cefepim for now, await blood bx and plan to discharge soon. Fevers is likely tail of engraftment as there is no evidence of infection.     I spent 30 minutes in the care of this patient today, which included time necessary for preparation for the visit, obtaining history, ordering medications/tests/procedures as medically indicated, review of pertinent medical literature, counseling of the patient, communication of recommendations to the care team, and documentation time.     Roxana Zelaya MD  [phone]

## 2024-07-26 NOTE — PLAN OF CARE
Occupational Therapy: Orders received. Chart reviewed and discussed with care team.? Occupational Therapy not indicated due to conversation with pt, pt states amb ind, no concerns with ADLs at this time (has been completing ind while IP). Pt reports has a strong support system at home. OT educated pt on letting medical team know if there are changes and OT needs to be re-consulted. No acute IP OT needs at this time.? Defer discharge recommendations to Medical Team.?Will complete orders.

## 2024-07-26 NOTE — PLAN OF CARE
Goal Outcome Evaluation:      Plan of Care Reviewed With: patient    Overall Patient Progress: improvingOverall Patient Progress: improving    .BP (!) 149/68 (BP Location: Left arm)   Pulse 89   Temp 98.4  F (36.9  C) (Oral)   Resp 16   Ht 1.829 m (6')   Wt 106.3 kg (234 lb 5.6 oz)   SpO2 98%   BMI 31.78 kg/m      Pt alert and oriented. Afebrile. Ovss on room air. Denies pain or nausea. He reports one loose/watery BM, not saving and encouraged to save. Up independent in room. CVC heparin locked. Will need PO Potassium replacement this morning. Cont with poc.       Problem: Adult Inpatient Plan of Care  Goal: Plan of Care Review  Description: The Plan of Care Review/Shift note should be completed every shift.  The Outcome Evaluation is a brief statement about your assessment that the patient is improving, declining, or no change.  This information will be displayed automatically on your shift  note.  7/26/2024 0637 by Alexander Godfrey, RN  Outcome: Progressing  Flowsheets (Taken 7/26/2024 0637)  Plan of Care Reviewed With: patient  Overall Patient Progress: improving     Problem: Fever with Neutropenia  Goal: Baseline Body Temperature  7/26/2024 0637 by Alexander Godfrey, RN  Outcome: Progressing

## 2024-07-26 NOTE — PROVIDER NOTIFICATION
Team notified-  do you want blood cultures and a lactic. sepsis triggered, cefepime and 1 LR bolus already given.

## 2024-07-27 LAB
ANION GAP SERPL CALCULATED.3IONS-SCNC: 7 MMOL/L (ref 7–15)
BACTERIA SPEC CULT: NORMAL
BACTERIA UR CULT: NO GROWTH
BASOPHILS # BLD AUTO: ABNORMAL 10*3/UL
BASOPHILS # BLD MANUAL: 0 10E3/UL (ref 0–0.2)
BASOPHILS NFR BLD AUTO: ABNORMAL %
BASOPHILS NFR BLD MANUAL: 0 %
BUN SERPL-MCNC: 12 MG/DL (ref 8–23)
CALCIUM SERPL-MCNC: 8.1 MG/DL (ref 8.8–10.4)
CHLORIDE SERPL-SCNC: 107 MMOL/L (ref 98–107)
CREAT SERPL-MCNC: 1.05 MG/DL (ref 0.67–1.17)
EGFRCR SERPLBLD CKD-EPI 2021: 80 ML/MIN/1.73M2
EOSINOPHIL # BLD AUTO: ABNORMAL 10*3/UL
EOSINOPHIL # BLD MANUAL: 0 10E3/UL (ref 0–0.7)
EOSINOPHIL NFR BLD AUTO: ABNORMAL %
EOSINOPHIL NFR BLD MANUAL: 0 %
ERYTHROCYTE [DISTWIDTH] IN BLOOD BY AUTOMATED COUNT: 13.2 % (ref 10–15)
GLUCOSE SERPL-MCNC: 99 MG/DL (ref 70–99)
HCO3 SERPL-SCNC: 22 MMOL/L (ref 22–29)
HCT VFR BLD AUTO: 26.1 % (ref 40–53)
HGB BLD-MCNC: 8.7 G/DL (ref 13.3–17.7)
IMM GRANULOCYTES # BLD: ABNORMAL 10*3/UL
IMM GRANULOCYTES NFR BLD: ABNORMAL %
LACTATE SERPL-SCNC: 0.8 MMOL/L (ref 0.7–2)
LYMPHOCYTES # BLD AUTO: ABNORMAL 10*3/UL
LYMPHOCYTES # BLD MANUAL: 0.2 10E3/UL (ref 0.8–5.3)
LYMPHOCYTES NFR BLD AUTO: ABNORMAL %
LYMPHOCYTES NFR BLD MANUAL: 22 %
MAGNESIUM SERPL-MCNC: 1.8 MG/DL (ref 1.7–2.3)
MCH RBC QN AUTO: 31.5 PG (ref 26.5–33)
MCHC RBC AUTO-ENTMCNC: 33.3 G/DL (ref 31.5–36.5)
MCV RBC AUTO: 95 FL (ref 78–100)
METAMYELOCYTES # BLD MANUAL: 0 10E3/UL
METAMYELOCYTES NFR BLD MANUAL: 3 %
MONOCYTES # BLD AUTO: ABNORMAL 10*3/UL
MONOCYTES # BLD MANUAL: 0.1 10E3/UL (ref 0–1.3)
MONOCYTES NFR BLD AUTO: ABNORMAL %
MONOCYTES NFR BLD MANUAL: 14 %
NEUTROPHILS # BLD AUTO: ABNORMAL 10*3/UL
NEUTROPHILS # BLD MANUAL: 0.5 10E3/UL (ref 1.6–8.3)
NEUTROPHILS NFR BLD AUTO: ABNORMAL %
NEUTROPHILS NFR BLD MANUAL: 61 %
NRBC # BLD AUTO: 0 10E3/UL
NRBC BLD AUTO-RTO: 0 /100
PHOSPHATE SERPL-MCNC: 3.4 MG/DL (ref 2.5–4.5)
PLAT MORPH BLD: ABNORMAL
PLATELET # BLD AUTO: 11 10E3/UL (ref 150–450)
POTASSIUM SERPL-SCNC: 3.7 MMOL/L (ref 3.4–5.3)
RBC # BLD AUTO: 2.76 10E6/UL (ref 4.4–5.9)
RBC MORPH BLD: ABNORMAL
SODIUM SERPL-SCNC: 136 MMOL/L (ref 135–145)
WBC # BLD AUTO: 0.8 10E3/UL (ref 4–11)

## 2024-07-27 PROCEDURE — 250N000011 HC RX IP 250 OP 636: Performed by: STUDENT IN AN ORGANIZED HEALTH CARE EDUCATION/TRAINING PROGRAM

## 2024-07-27 PROCEDURE — 250N000013 HC RX MED GY IP 250 OP 250 PS 637: Performed by: INTERNAL MEDICINE

## 2024-07-27 PROCEDURE — 85007 BL SMEAR W/DIFF WBC COUNT: CPT | Performed by: INTERNAL MEDICINE

## 2024-07-27 PROCEDURE — 83735 ASSAY OF MAGNESIUM: CPT | Performed by: STUDENT IN AN ORGANIZED HEALTH CARE EDUCATION/TRAINING PROGRAM

## 2024-07-27 PROCEDURE — 206N000001 HC R&B BMT UMMC

## 2024-07-27 PROCEDURE — 250N000011 HC RX IP 250 OP 636: Performed by: INTERNAL MEDICINE

## 2024-07-27 PROCEDURE — 87040 BLOOD CULTURE FOR BACTERIA: CPT | Performed by: STUDENT IN AN ORGANIZED HEALTH CARE EDUCATION/TRAINING PROGRAM

## 2024-07-27 PROCEDURE — 85027 COMPLETE CBC AUTOMATED: CPT | Performed by: INTERNAL MEDICINE

## 2024-07-27 PROCEDURE — 99232 SBSQ HOSP IP/OBS MODERATE 35: CPT | Mod: GC

## 2024-07-27 PROCEDURE — 84100 ASSAY OF PHOSPHORUS: CPT | Performed by: STUDENT IN AN ORGANIZED HEALTH CARE EDUCATION/TRAINING PROGRAM

## 2024-07-27 PROCEDURE — 250N000011 HC RX IP 250 OP 636: Mod: JZ | Performed by: PHYSICIAN ASSISTANT

## 2024-07-27 PROCEDURE — 83605 ASSAY OF LACTIC ACID: CPT

## 2024-07-27 PROCEDURE — 250N000013 HC RX MED GY IP 250 OP 250 PS 637: Performed by: PHYSICIAN ASSISTANT

## 2024-07-27 PROCEDURE — 80048 BASIC METABOLIC PNL TOTAL CA: CPT | Performed by: INTERNAL MEDICINE

## 2024-07-27 PROCEDURE — 250N000011 HC RX IP 250 OP 636

## 2024-07-27 RX ORDER — MAGNESIUM SULFATE HEPTAHYDRATE 40 MG/ML
2 INJECTION, SOLUTION INTRAVENOUS ONCE
Status: COMPLETED | OUTPATIENT
Start: 2024-07-27 | End: 2024-07-27

## 2024-07-27 RX ORDER — POTASSIUM CHLORIDE 29.8 MG/ML
20 INJECTION INTRAVENOUS ONCE
Status: COMPLETED | OUTPATIENT
Start: 2024-07-27 | End: 2024-07-27

## 2024-07-27 RX ADMIN — LOPERAMIDE HYDROCHLORIDE 4 MG: 2 CAPSULE ORAL at 19:59

## 2024-07-27 RX ADMIN — LOPERAMIDE HYDROCHLORIDE 4 MG: 2 CAPSULE ORAL at 16:02

## 2024-07-27 RX ADMIN — Medication 5 ML: at 03:16

## 2024-07-27 RX ADMIN — Medication 200 MG: at 09:46

## 2024-07-27 RX ADMIN — MAGNESIUM SULFATE HEPTAHYDRATE 2 G: 2 INJECTION, SOLUTION INTRAVENOUS at 06:05

## 2024-07-27 RX ADMIN — ACETAMINOPHEN 650 MG: 325 TABLET, FILM COATED ORAL at 18:16

## 2024-07-27 RX ADMIN — PANTOPRAZOLE SODIUM 40 MG: 40 TABLET, DELAYED RELEASE ORAL at 07:45

## 2024-07-27 RX ADMIN — Medication 5 ML: at 14:50

## 2024-07-27 RX ADMIN — CEFEPIME HYDROCHLORIDE 2 G: 2 INJECTION, POWDER, FOR SOLUTION INTRAVENOUS at 21:24

## 2024-07-27 RX ADMIN — CEFEPIME HYDROCHLORIDE 2 G: 2 INJECTION, POWDER, FOR SOLUTION INTRAVENOUS at 04:50

## 2024-07-27 RX ADMIN — OLANZAPINE 5 MG: 2.5 TABLET, FILM COATED ORAL at 21:24

## 2024-07-27 RX ADMIN — ACYCLOVIR 800 MG: 800 TABLET ORAL at 19:59

## 2024-07-27 RX ADMIN — FLUCONAZOLE 200 MG: 200 TABLET ORAL at 07:45

## 2024-07-27 RX ADMIN — AMLODIPINE BESYLATE 10 MG: 10 TABLET ORAL at 07:46

## 2024-07-27 RX ADMIN — POTASSIUM CHLORIDE 20 MEQ: 29.8 INJECTION, SOLUTION INTRAVENOUS at 04:50

## 2024-07-27 RX ADMIN — LOPERAMIDE HYDROCHLORIDE 4 MG: 2 CAPSULE ORAL at 13:38

## 2024-07-27 RX ADMIN — CEFEPIME HYDROCHLORIDE 2 G: 2 INJECTION, POWDER, FOR SOLUTION INTRAVENOUS at 13:38

## 2024-07-27 RX ADMIN — ACETAMINOPHEN 650 MG: 325 TABLET, FILM COATED ORAL at 13:46

## 2024-07-27 RX ADMIN — FILGRASTIM-AAFI 480 MCG: 480 INJECTION, SOLUTION INTRAVENOUS; SUBCUTANEOUS at 20:00

## 2024-07-27 RX ADMIN — METOPROLOL SUCCINATE 100 MG: 50 TABLET, EXTENDED RELEASE ORAL at 19:59

## 2024-07-27 RX ADMIN — LOPERAMIDE HYDROCHLORIDE 4 MG: 2 CAPSULE ORAL at 07:45

## 2024-07-27 RX ADMIN — ACETAMINOPHEN 650 MG: 325 TABLET, FILM COATED ORAL at 07:46

## 2024-07-27 RX ADMIN — LISINOPRIL 20 MG: 10 TABLET ORAL at 07:45

## 2024-07-27 RX ADMIN — Medication 5 ML: at 14:51

## 2024-07-27 RX ADMIN — ACYCLOVIR 800 MG: 800 TABLET ORAL at 07:45

## 2024-07-27 ASSESSMENT — ACTIVITIES OF DAILY LIVING (ADL)
ADLS_ACUITY_SCORE: 18

## 2024-07-27 NOTE — PLAN OF CARE
Goal Outcome Evaluation:       No complaints of pain or nausea. Max temp 100.1, tylenol continued. Vitals otherwise stable with some hypertension and tachycardia. Good appetite. Ambulates independently in room. BP (!) 166/75 (BP Location: Left arm)   Pulse 107   Temp 100.1  F (37.8  C) (Oral)   Resp 18   Ht 1.829 m (6')   Wt 105.6 kg (232 lb 14.4 oz)   SpO2 98%   BMI 31.59 kg/m             Problem: Adult Inpatient Plan of Care  Goal: Optimal Comfort and Wellbeing  Outcome: Progressing     Problem: Fever with Neutropenia  Goal: Baseline Body Temperature  Outcome: Progressing  Goal: Absence of Infection  Outcome: Progressing

## 2024-07-27 NOTE — PROVIDER NOTIFICATION
Read - Yesterday - 11:50 pm  FYI: temp 101.1, hr 111, 154/85, 22. Culture from line 2/26 at 0845 and peripheral culture 7/25 at 1941  JEOVANY Hernandez - Yesterday - 11:52 pm  Can you release another blood culture and drop from the central line? Otherwise no changes  JEOVANY Hernandez - Yesterday - 11:52 pm  And draw from*  WALTER  Read - Yesterday - 11:53 pm  Only central line?  JEOVANY Hernandez - Yesterday - 11:53 pm  Yes  WALTER  Read - Yesterday - 11:54 pm  Thank you  WALTER

## 2024-07-27 NOTE — PROGRESS NOTES
BMT/Cell Therapy Daily Progress Note   07/27/2024    Patient ID:  Inocente Romero is a 64 year old male, hx of MM. Today is D + 11 s/p HD melphalan and auto HSCT.    Admitted 7/25 for F/N    INTERVAL  HISTORY   He continues to endorse intermittent fevers and chills however feeling better than prior. Denies shortness of breath, or abdominal pain. Having loose stools. Able to eat without issue.       Review of Systems: 10 point ROS negative except as noted above.      PHYSICAL EXAM   BP (!) 164/76 (BP Location: Left arm)   Pulse 91   Temp 99.1  F (37.3  C) (Oral)   Resp 18   Ht 1.829 m (6')   Wt 105.6 kg (232 lb 14.4 oz)   SpO2 100%   BMI 31.59 kg/m      Weight In/Out     Wt Readings from Last 3 Encounters:   07/27/24 105.6 kg (232 lb 14.4 oz)   07/25/24 106.5 kg (234 lb 12.8 oz)   07/24/24 105.1 kg (231 lb 9.6 oz)      [unfilled]     General: NAD   Eyes: sclera anicteric    Lungs: CTAB  Cardiovascular: RRR, no M/R/G  Lymphatics: no edema  Skin: no rashes or petechiae  Neuro: A&O   Additional Findings: clean and dry    LABS AND IMAGING: I have assessed all abnormal lab values for their clinical significance and any values considered clinically significant have been addressed in the assessment and plan.        Lab Results   Component Value Date    WBC 0.8 (LL) 07/27/2024    ANEU 0.5 (L) 07/27/2024    HGB 8.7 (L) 07/27/2024    HCT 26.1 (L) 07/27/2024    PLT 11 (LL) 07/27/2024     07/27/2024    POTASSIUM 3.7 07/27/2024    CHLORIDE 107 07/27/2024    CO2 22 07/27/2024    GLC 99 07/27/2024    BUN 12.0 07/27/2024    CR 1.05 07/27/2024    MAG 1.8 07/27/2024    INR 1.11 07/26/2024       SYSTEMS-BASED ASSESSMENT AND PLAN     BMT/IEC PROTOCOL for MM Auto PBSCT  Transplants for multiple myeloma:  The patient has Standard risk MM; collection goal for 2 transplants.  Pt collected 9.84x10^06 CD34/kg     7/15 - D-1: 200mgm/2 Melphalan.   7/16: Transplant day 0. Thawed cell dose 4.92 x10^6 Cd34/kg.   - post  thaw/wash cell dose 3.77x10^6 CD34/kg.    Uric acid 3.6, stop allopurinol    Curently d+ 11 s/p auto SCT      HEME/COAG  - Transfusion parameters: hgb <7g/dL and plts <10,000. Platelets 7/25  - daily GCSF ongoing until ANC >2500 x 2d    ID  - neutropenic fevers: infections vs. Engraftment syndrome, continue cefepime; bld cx NGTD, UA bland; Ucx P; CXR neg.  PPx: Fluc, ACV     GI  CINV ppx: zofran/compazine prn. Zypreza at bedtime.  CI diarrhea: c diff neg (7/25): schedule imodium    CV:   - Continue Norvasc, metoprolol. Watching BP closely.    FEN/Renal:  - noted on KCL 10meq BID.     Medically Ready for Discharge: Anticipated in 2-4 Days    Clinically Significant Risk Factors          # Hypocalcemia: Lowest Ca = 8.1 mg/dL in last 2 days, will monitor and replace as appropriate   # Hypomagnesemia: Lowest Mg = 1.5 mg/dL in last 2 days, will replace as needed       # Thrombocytopenia: Lowest platelets = 11 in last 2 days, will monitor for bleeding              # Obesity: Estimated body mass index is 31.59 kg/m  as calculated from the following:    Height as of this encounter: 1.829 m (6').    Weight as of this encounter: 105.6 kg (232 lb 14.4 oz)., PRESENT ON ADMISSION              I spent 40 minutes in the care of this patient today, which included time necessary for preparation for the visit, obtaining history, ordering medications/tests/procedures as medically indicated, review of pertinent medical literature, counseling of the patient, communication of recommendations to the care team, and documentation time.    The longitudinal plan of care for the diagnosis(es)/condition(s) as documented were addressed during this visit. Due to the added complexity in care, I will continue to support Felix in the subsequent management and with ongoing continuity of care.    Discussed with Dr. Nathalie Damon MD  Heme/Onc Fellow

## 2024-07-27 NOTE — PLAN OF CARE
"A+Ox4, Tmax 101.1 at 2339, and tachy with fever. VS returned to baseline after fever resolved with PRN tylenol. MD notified and orders received for culture from central line. Bruising noted around bio patch with scant, flaky dried sanguinous drainage under dressing. Dressing CDI. Denies pain, nausea. No BM this shift. AUP. Mag and potassium replaced. POC maintained.    Problem: Adult Inpatient Plan of Care  Goal: Plan of Care Review  Description: The Plan of Care Review/Shift note should be completed every shift.  The Outcome Evaluation is a brief statement about your assessment that the patient is improving, declining, or no change.  This information will be displayed automatically on your shift  note.  Outcome: Not Progressing  Goal: Patient-Specific Goal (Individualized)  Description: You can add care plan individualizations to a care plan. Examples of Individualization might be:  \"Parent requests to be called daily at 9am for status\", \"I have a hard time hearing out of my right ear\", or \"Do not touch me to wake me up as it startles  me\".  Outcome: Not Progressing  Goal: Absence of Hospital-Acquired Illness or Injury  Outcome: Not Progressing  Intervention: Identify and Manage Fall Risk  Recent Flowsheet Documentation  Taken 7/27/2024 0459 by Judi Chavez RN  Safety Promotion/Fall Prevention: safety round/check completed  Taken 7/27/2024 0318 by Judi Chavez, RN  Safety Promotion/Fall Prevention: safety round/check completed  Taken 7/27/2024 0137 by Judi Chavez, RN  Safety Promotion/Fall Prevention: safety round/check completed  Taken 7/26/2024 2339 by Judi Chavez, RN  Safety Promotion/Fall Prevention: safety round/check completed  Taken 7/26/2024 2200 by Judi Chavez, RN  Safety Promotion/Fall Prevention: safety round/check completed  Taken 7/26/2024 1933 by Judi Chavez, RN  Safety Promotion/Fall Prevention: safety round/check completed  Intervention: Prevent Skin Injury  Recent Flowsheet " Documentation  Taken 7/26/2024 1933 by Judi Chavez RN  Body Position: position changed independently  Skin Protection: adhesive use limited  Device Skin Pressure Protection: adhesive use limited  Intervention: Prevent and Manage VTE (Venous Thromboembolism) Risk  Recent Flowsheet Documentation  Taken 7/26/2024 1933 by Judi Chavez RN  VTE Prevention/Management: compression stockings off  Intervention: Prevent Infection  Recent Flowsheet Documentation  Taken 7/27/2024 0318 by Judi Chavez RN  Infection Prevention:   environmental surveillance performed   equipment surfaces disinfected   hand hygiene promoted  Taken 7/26/2024 2339 by Judi Chavez RN  Infection Prevention:   environmental surveillance performed   equipment surfaces disinfected   hand hygiene promoted  Taken 7/26/2024 1933 by Judi Chavez RN  Infection Prevention:   environmental surveillance performed   equipment surfaces disinfected   hand hygiene promoted  Goal: Readiness for Transition of Care  Outcome: Not Progressing     Problem: Fever with Neutropenia  Goal: Baseline Body Temperature  Outcome: Not Progressing  Goal: Absence of Infection  Outcome: Not Progressing  Intervention: Prevent Infection and Maximize Resistance  Recent Flowsheet Documentation  Taken 7/27/2024 0318 by Judi Chavez RN  Infection Prevention:   environmental surveillance performed   equipment surfaces disinfected   hand hygiene promoted  Taken 7/26/2024 2339 by Judi Chavez RN  Infection Prevention:   environmental surveillance performed   equipment surfaces disinfected   hand hygiene promoted  Taken 7/26/2024 1933 by Judi Chavez RN  Infection Prevention:   environmental surveillance performed   equipment surfaces disinfected   hand hygiene promoted  Oral Care: oral rinse provided  Perineal Care: perineal hygiene encouraged     Problem: Adult Inpatient Plan of Care  Goal: Optimal Comfort and Wellbeing  Outcome: Progressing   Goal Outcome  Evaluation:

## 2024-07-28 LAB
ABO/RH(D): NORMAL
ANION GAP SERPL CALCULATED.3IONS-SCNC: 8 MMOL/L (ref 7–15)
ANTIBODY SCREEN: NEGATIVE
BASOPHILS # BLD AUTO: ABNORMAL 10*3/UL
BASOPHILS # BLD MANUAL: 0 10E3/UL (ref 0–0.2)
BASOPHILS NFR BLD AUTO: ABNORMAL %
BASOPHILS NFR BLD MANUAL: 0 %
BUN SERPL-MCNC: 10.3 MG/DL (ref 8–23)
BURR CELLS BLD QL SMEAR: SLIGHT
CA-I BLD-MCNC: 4.5 MG/DL (ref 4.4–5.2)
CALCIUM SERPL-MCNC: 7.9 MG/DL (ref 8.8–10.4)
CHLORIDE SERPL-SCNC: 106 MMOL/L (ref 98–107)
CREAT SERPL-MCNC: 0.93 MG/DL (ref 0.67–1.17)
EGFRCR SERPLBLD CKD-EPI 2021: >90 ML/MIN/1.73M2
EOSINOPHIL # BLD AUTO: ABNORMAL 10*3/UL
EOSINOPHIL # BLD MANUAL: 0 10E3/UL (ref 0–0.7)
EOSINOPHIL NFR BLD AUTO: ABNORMAL %
EOSINOPHIL NFR BLD MANUAL: 0 %
ERYTHROCYTE [DISTWIDTH] IN BLOOD BY AUTOMATED COUNT: 13.2 % (ref 10–15)
GLUCOSE SERPL-MCNC: 97 MG/DL (ref 70–99)
HCO3 SERPL-SCNC: 23 MMOL/L (ref 22–29)
HCT VFR BLD AUTO: 25.6 % (ref 40–53)
HGB BLD-MCNC: 8.6 G/DL (ref 13.3–17.7)
IMM GRANULOCYTES # BLD: ABNORMAL 10*3/UL
IMM GRANULOCYTES NFR BLD: ABNORMAL %
LACTATE SERPL-SCNC: 0.7 MMOL/L (ref 0.7–2)
LACTATE SERPL-SCNC: 1.2 MMOL/L (ref 0.7–2)
LYMPHOCYTES # BLD AUTO: ABNORMAL 10*3/UL
LYMPHOCYTES # BLD MANUAL: 0.3 10E3/UL (ref 0.8–5.3)
LYMPHOCYTES NFR BLD AUTO: ABNORMAL %
LYMPHOCYTES NFR BLD MANUAL: 17 %
MAGNESIUM SERPL-MCNC: 1.8 MG/DL (ref 1.7–2.3)
MCH RBC QN AUTO: 31.6 PG (ref 26.5–33)
MCHC RBC AUTO-ENTMCNC: 33.6 G/DL (ref 31.5–36.5)
MCV RBC AUTO: 94 FL (ref 78–100)
MONOCYTES # BLD AUTO: ABNORMAL 10*3/UL
MONOCYTES # BLD MANUAL: 0.2 10E3/UL (ref 0–1.3)
MONOCYTES NFR BLD AUTO: ABNORMAL %
MONOCYTES NFR BLD MANUAL: 12 %
MYELOCYTES # BLD MANUAL: 0 10E3/UL
MYELOCYTES NFR BLD MANUAL: 1 %
NEUTROPHILS # BLD AUTO: ABNORMAL 10*3/UL
NEUTROPHILS # BLD MANUAL: 1.4 10E3/UL (ref 1.6–8.3)
NEUTROPHILS NFR BLD AUTO: ABNORMAL %
NEUTROPHILS NFR BLD MANUAL: 70 %
NRBC # BLD AUTO: 0 10E3/UL
NRBC BLD AUTO-RTO: 0 /100
PHOSPHATE SERPL-MCNC: 2.6 MG/DL (ref 2.5–4.5)
PLAT MORPH BLD: ABNORMAL
PLATELET # BLD AUTO: 13 10E3/UL (ref 150–450)
POTASSIUM SERPL-SCNC: 3.8 MMOL/L (ref 3.4–5.3)
RBC # BLD AUTO: 2.72 10E6/UL (ref 4.4–5.9)
RBC MORPH BLD: ABNORMAL
SODIUM SERPL-SCNC: 137 MMOL/L (ref 135–145)
SPECIMEN EXPIRATION DATE: NORMAL
WBC # BLD AUTO: 2 10E3/UL (ref 4–11)

## 2024-07-28 PROCEDURE — 250N000011 HC RX IP 250 OP 636: Mod: JZ | Performed by: PHYSICIAN ASSISTANT

## 2024-07-28 PROCEDURE — 85007 BL SMEAR W/DIFF WBC COUNT: CPT | Performed by: INTERNAL MEDICINE

## 2024-07-28 PROCEDURE — 250N000011 HC RX IP 250 OP 636

## 2024-07-28 PROCEDURE — 87040 BLOOD CULTURE FOR BACTERIA: CPT | Performed by: STUDENT IN AN ORGANIZED HEALTH CARE EDUCATION/TRAINING PROGRAM

## 2024-07-28 PROCEDURE — 250N000011 HC RX IP 250 OP 636: Performed by: INTERNAL MEDICINE

## 2024-07-28 PROCEDURE — 99232 SBSQ HOSP IP/OBS MODERATE 35: CPT | Mod: GC

## 2024-07-28 PROCEDURE — 36415 COLL VENOUS BLD VENIPUNCTURE: CPT | Performed by: INTERNAL MEDICINE

## 2024-07-28 PROCEDURE — 80048 BASIC METABOLIC PNL TOTAL CA: CPT | Performed by: INTERNAL MEDICINE

## 2024-07-28 PROCEDURE — 84100 ASSAY OF PHOSPHORUS: CPT

## 2024-07-28 PROCEDURE — 85027 COMPLETE CBC AUTOMATED: CPT | Performed by: INTERNAL MEDICINE

## 2024-07-28 PROCEDURE — 86900 BLOOD TYPING SEROLOGIC ABO: CPT | Performed by: INTERNAL MEDICINE

## 2024-07-28 PROCEDURE — 250N000009 HC RX 250

## 2024-07-28 PROCEDURE — 258N000003 HC RX IP 258 OP 636

## 2024-07-28 PROCEDURE — 206N000001 HC R&B BMT UMMC

## 2024-07-28 PROCEDURE — 82330 ASSAY OF CALCIUM: CPT | Performed by: STUDENT IN AN ORGANIZED HEALTH CARE EDUCATION/TRAINING PROGRAM

## 2024-07-28 PROCEDURE — 250N000013 HC RX MED GY IP 250 OP 250 PS 637: Performed by: PHYSICIAN ASSISTANT

## 2024-07-28 PROCEDURE — 86901 BLOOD TYPING SEROLOGIC RH(D): CPT | Performed by: INTERNAL MEDICINE

## 2024-07-28 PROCEDURE — 83605 ASSAY OF LACTIC ACID: CPT

## 2024-07-28 PROCEDURE — 250N000013 HC RX MED GY IP 250 OP 250 PS 637: Performed by: INTERNAL MEDICINE

## 2024-07-28 PROCEDURE — 87040 BLOOD CULTURE FOR BACTERIA: CPT | Performed by: INTERNAL MEDICINE

## 2024-07-28 PROCEDURE — 83735 ASSAY OF MAGNESIUM: CPT

## 2024-07-28 PROCEDURE — 250N000011 HC RX IP 250 OP 636: Performed by: STUDENT IN AN ORGANIZED HEALTH CARE EDUCATION/TRAINING PROGRAM

## 2024-07-28 RX ORDER — MAGNESIUM SULFATE HEPTAHYDRATE 40 MG/ML
2 INJECTION, SOLUTION INTRAVENOUS ONCE
Status: COMPLETED | OUTPATIENT
Start: 2024-07-28 | End: 2024-07-28

## 2024-07-28 RX ORDER — POTASSIUM CHLORIDE 29.8 MG/ML
20 INJECTION INTRAVENOUS ONCE
Status: COMPLETED | OUTPATIENT
Start: 2024-07-28 | End: 2024-07-28

## 2024-07-28 RX ADMIN — ACETAMINOPHEN 650 MG: 325 TABLET, FILM COATED ORAL at 23:30

## 2024-07-28 RX ADMIN — METOPROLOL SUCCINATE 100 MG: 50 TABLET, EXTENDED RELEASE ORAL at 19:46

## 2024-07-28 RX ADMIN — OLANZAPINE 5 MG: 2.5 TABLET, FILM COATED ORAL at 21:46

## 2024-07-28 RX ADMIN — CEFEPIME HYDROCHLORIDE 2 G: 2 INJECTION, POWDER, FOR SOLUTION INTRAVENOUS at 04:38

## 2024-07-28 RX ADMIN — Medication 5 ML: at 23:31

## 2024-07-28 RX ADMIN — ACYCLOVIR 800 MG: 800 TABLET ORAL at 19:46

## 2024-07-28 RX ADMIN — PANTOPRAZOLE SODIUM 40 MG: 40 TABLET, DELAYED RELEASE ORAL at 07:45

## 2024-07-28 RX ADMIN — Medication 200 MG: at 07:45

## 2024-07-28 RX ADMIN — ACETAMINOPHEN 650 MG: 325 TABLET, FILM COATED ORAL at 13:14

## 2024-07-28 RX ADMIN — LOPERAMIDE HYDROCHLORIDE 4 MG: 2 CAPSULE ORAL at 19:46

## 2024-07-28 RX ADMIN — Medication 5 ML: at 03:08

## 2024-07-28 RX ADMIN — AMLODIPINE BESYLATE 10 MG: 10 TABLET ORAL at 07:46

## 2024-07-28 RX ADMIN — Medication 5 ML: at 14:36

## 2024-07-28 RX ADMIN — POTASSIUM PHOSPHATE, MONOBASIC POTASSIUM PHOSPHATE, DIBASIC 9 MMOL: 224; 236 INJECTION, SOLUTION, CONCENTRATE INTRAVENOUS at 06:05

## 2024-07-28 RX ADMIN — LISINOPRIL 20 MG: 10 TABLET ORAL at 07:46

## 2024-07-28 RX ADMIN — FLUCONAZOLE 200 MG: 200 TABLET ORAL at 07:48

## 2024-07-28 RX ADMIN — POTASSIUM CHLORIDE 20 MEQ: 29.8 INJECTION, SOLUTION INTRAVENOUS at 04:38

## 2024-07-28 RX ADMIN — CEFEPIME HYDROCHLORIDE 2 G: 2 INJECTION, POWDER, FOR SOLUTION INTRAVENOUS at 21:46

## 2024-07-28 RX ADMIN — ACETAMINOPHEN 325 MG: 325 TABLET, FILM COATED ORAL at 09:17

## 2024-07-28 RX ADMIN — FILGRASTIM-AAFI 480 MCG: 480 INJECTION, SOLUTION INTRAVENOUS; SUBCUTANEOUS at 19:46

## 2024-07-28 RX ADMIN — ACETAMINOPHEN 650 MG: 325 TABLET, FILM COATED ORAL at 03:07

## 2024-07-28 RX ADMIN — ACYCLOVIR 800 MG: 800 TABLET ORAL at 07:46

## 2024-07-28 RX ADMIN — CEFEPIME HYDROCHLORIDE 2 G: 2 INJECTION, POWDER, FOR SOLUTION INTRAVENOUS at 13:05

## 2024-07-28 RX ADMIN — MAGNESIUM SULFATE HEPTAHYDRATE 2 G: 2 INJECTION, SOLUTION INTRAVENOUS at 05:53

## 2024-07-28 RX ADMIN — Medication 5 ML: at 14:35

## 2024-07-28 ASSESSMENT — ACTIVITIES OF DAILY LIVING (ADL)
ADLS_ACUITY_SCORE: 18

## 2024-07-28 NOTE — PROGRESS NOTES
Read - Yesterday - 8:10 pm  FYI: temp 100.5, 153/69, 107- appears comfortable.recent line cultures on 7/27 at 0014, 7/26 at 0845 and peripheral cultures on 7/25 at 1941- all NGTD. APAP last given at 1816  WALTER  Read - Yesterday - 8:11 pm  Currently on cefepime  RICH Georges - Yesterday - 8:14 pm  Ok thank you. No need to repeat cultures yet.

## 2024-07-28 NOTE — PROGRESS NOTES
BMT/Cell Therapy Daily Progress Note   07/28/2024    Patient ID:  Inocente Romero is a 64 year old male, hx of MM. Today is D + 12 s/p HD melphalan and auto HSCT.    Admitted 7/25 for F/N    INTERVAL  HISTORY   He continues to endorse intermittent fevers and chills. Denies shortness of breath, or abdominal pain. No rashes or shortness of breath/ Appetite has been alright.    Review of Systems: 10 point ROS negative except as noted above.      PHYSICAL EXAM   /67 (BP Location: Left arm)   Pulse 85   Temp 98.3  F (36.8  C) (Oral)   Resp 16   Ht 1.829 m (6')   Wt 105.6 kg (232 lb 12.8 oz)   SpO2 96%   BMI 31.57 kg/m      Weight In/Out     Wt Readings from Last 3 Encounters:   07/28/24 105.6 kg (232 lb 12.8 oz)   07/25/24 106.5 kg (234 lb 12.8 oz)   07/24/24 105.1 kg (231 lb 9.6 oz)      [unfilled]     General: NAD   Eyes: sclera anicteric    Lungs: CTAB  Cardiovascular: RRR, no M/R/G  Lymphatics: no edema  Skin: no rashes or petechiae  Neuro: A&O   Additional Findings: clean and dry    LABS AND IMAGING: I have assessed all abnormal lab values for their clinical significance and any values considered clinically significant have been addressed in the assessment and plan.        Lab Results   Component Value Date    WBC 2.0 (L) 07/28/2024    ANEU 1.4 (L) 07/28/2024    HGB 8.6 (L) 07/28/2024    HCT 25.6 (L) 07/28/2024    PLT 13 (LL) 07/28/2024     07/28/2024    POTASSIUM 3.8 07/28/2024    CHLORIDE 106 07/28/2024    CO2 23 07/28/2024    GLC 97 07/28/2024    BUN 10.3 07/28/2024    CR 0.93 07/28/2024    MAG 1.8 07/28/2024    INR 1.11 07/26/2024       SYSTEMS-BASED ASSESSMENT AND PLAN     BMT/IEC PROTOCOL for MM Auto PBSCT  Transplants for multiple myeloma:  The patient has Standard risk MM; collection goal for 2 transplants.  Pt collected 9.84x10^06 CD34/kg     7/15 - D-1: 200mgm/2 Melphalan.   7/16: Transplant day 0. Thawed cell dose 4.92 x10^6 Cd34/kg.   - post thaw/wash cell dose 3.77x10^6  CD34/kg.  - discontinued allopurinol     Curently d+ 12 s/p auto SCT      HEME/COAG  - Transfusion parameters: hgb <7g/dL and plts <10,000. Platelets 7/25  - daily GCSF ongoing until ANC >2500 x 2d    ID  - neutropenic fevers: infections vs. Engraftment syndrome, continue cefepime; bld cx NGTD, UA bland; Ucx P; CXR neg.  PPx: Fluc, ACV     GI  CINV ppx: zofran/compazine prn. Zypreza at bedtime.  CI diarrhea: c diff neg (7/25): schedule imodium    CV:   - Continue Norvasc, metoprolol. Watching BP closely.    FEN/Renal:  - noted on KCL 10meq BID.     Medically Ready for Discharge: Anticipated in 1-2 Days    Clinically Significant Risk Factors                  # Thrombocytopenia: Lowest platelets = 11 in last 2 days, will monitor for bleeding              # Obesity: Estimated body mass index is 31.57 kg/m  as calculated from the following:    Height as of this encounter: 1.829 m (6').    Weight as of this encounter: 105.6 kg (232 lb 12.8 oz)., PRESENT ON ADMISSION              I spent 40 minutes in the care of this patient today, which included time necessary for preparation for the visit, obtaining history, ordering medications/tests/procedures as medically indicated, review of pertinent medical literature, counseling of the patient, communication of recommendations to the care team, and documentation time.    The longitudinal plan of care for the diagnosis(es)/condition(s) as documented were addressed during this visit. Due to the added complexity in care, I will continue to support Felix in the subsequent management and with ongoing continuity of care.    Discussed with Dr. Nathalie Damon MD  Heme/Onc Fellow

## 2024-07-28 NOTE — PLAN OF CARE
Goal Outcome Evaluation:        No complaints of pain or nausea. Max temp 99.1, tylenol continued. Vitals otherwise stable with some hypertension, not within notifying parameters. Good appetite. Ambulates independently in room.            Problem: Adult Inpatient Plan of Care  Goal: Optimal Comfort and Wellbeing  Outcome: Progressing     Problem: Fever with Neutropenia  Goal: Baseline Body Temperature  Outcome: Progressing  Goal: Absence of Infection  Outcome: Progressing

## 2024-07-28 NOTE — PROVIDER NOTIFICATION
Read - 3:18 am  FYI: temp 101.2, 157/68, 94. Multiple cultures from line (7/27, 7/26) and on cefepime  JEOVANY Hernandez - 3:20 am  I would repeat a culture from the line per the sign out if there is a conditional.  WALTER  Read - 3:22 am  The only conditional for subsequent fevers is for peripheral cultures. I can add a new order for a line culture if you'd like me to enter as a verbal  JEOVANY Hernandez - 3:23 am  You can do peripheral too  WALTER  Read - 3:24 am  Will do

## 2024-07-28 NOTE — PLAN OF CARE
"A+Ox4. Tmax 101.1 at 0307. Tylenol given and cultures from line drawn and peripherally drawn. (At 1953 temp was recorded as 102.1 but on recheck at 1957 only 100.5)  All other vitals stable, denies pain, nausea. No blood products needed. Potassium, mag replaced, potassium phos is started, will need to be completed on day shift. POC maintained.      Problem: Adult Inpatient Plan of Care  Goal: Readiness for Transition of Care  Outcome: Not Progressing     Problem: Fever with Neutropenia  Goal: Baseline Body Temperature  Outcome: Not Progressing     Problem: Adult Inpatient Plan of Care  Goal: Plan of Care Review  Description: The Plan of Care Review/Shift note should be completed every shift.  The Outcome Evaluation is a brief statement about your assessment that the patient is improving, declining, or no change.  This information will be displayed automatically on your shift  note.  Outcome: Progressing  Goal: Patient-Specific Goal (Individualized)  Description: You can add care plan individualizations to a care plan. Examples of Individualization might be:  \"Parent requests to be called daily at 9am for status\", \"I have a hard time hearing out of my right ear\", or \"Do not touch me to wake me up as it startles  me\".  Outcome: Progressing  Goal: Absence of Hospital-Acquired Illness or Injury  Outcome: Progressing  Intervention: Identify and Manage Fall Risk  Recent Flowsheet Documentation  Taken 7/28/2024 0433 by Judi Chavez, RN  Safety Promotion/Fall Prevention: safety round/check completed  Taken 7/28/2024 0307 by Judi Chavez RN  Safety Promotion/Fall Prevention: safety round/check completed  Taken 7/27/2024 2300 by Judi Chavez, RN  Safety Promotion/Fall Prevention: safety round/check completed  Taken 7/27/2024 2130 by Judi Chavez, RN  Safety Promotion/Fall Prevention: safety round/check completed  Taken 7/27/2024 1953 by Judi Chavez, RN  Safety Promotion/Fall Prevention: safety round/check " completed  Intervention: Prevent Skin Injury  Recent Flowsheet Documentation  Taken 7/27/2024 1953 by Judi Chavez RN  Body Position: position changed independently  Skin Protection: adhesive use limited  Device Skin Pressure Protection: adhesive use limited  Intervention: Prevent and Manage VTE (Venous Thromboembolism) Risk  Recent Flowsheet Documentation  Taken 7/27/2024 1953 by Judi Chavez RN  VTE Prevention/Management: compression stockings off  Intervention: Prevent Infection  Recent Flowsheet Documentation  Taken 7/28/2024 0307 by Judi Chavez RN  Infection Prevention:   environmental surveillance performed   equipment surfaces disinfected   hand hygiene promoted  Taken 7/27/2024 2300 by Judi Chavez RN  Infection Prevention:   environmental surveillance performed   equipment surfaces disinfected   hand hygiene promoted  Taken 7/27/2024 1953 by Judi Chavez RN  Infection Prevention:   environmental surveillance performed   equipment surfaces disinfected   hand hygiene promoted  Goal: Optimal Comfort and Wellbeing  Outcome: Progressing     Problem: Fever with Neutropenia  Goal: Absence of Infection  Outcome: Progressing  Intervention: Prevent Infection and Maximize Resistance  Recent Flowsheet Documentation  Taken 7/28/2024 0307 by Judi Chavez RN  Infection Prevention:   environmental surveillance performed   equipment surfaces disinfected   hand hygiene promoted  Taken 7/27/2024 2300 by Judi Chavez RN  Infection Prevention:   environmental surveillance performed   equipment surfaces disinfected   hand hygiene promoted  Taken 7/27/2024 1953 by Judi Chavez RN  Infection Prevention:   environmental surveillance performed   equipment surfaces disinfected   hand hygiene promoted  Oral Care: oral rinse provided  Perineal Care: perineal hygiene encouraged   Goal Outcome Evaluation:

## 2024-07-29 LAB
ALBUMIN SERPL BCG-MCNC: 3.3 G/DL (ref 3.5–5.2)
ALP SERPL-CCNC: 50 U/L (ref 40–150)
ALT SERPL W P-5'-P-CCNC: 13 U/L (ref 0–70)
ANION GAP SERPL CALCULATED.3IONS-SCNC: 8 MMOL/L (ref 7–15)
AST SERPL W P-5'-P-CCNC: 13 U/L (ref 0–45)
BASOPHILS # BLD AUTO: ABNORMAL 10*3/UL
BASOPHILS # BLD MANUAL: 0 10E3/UL (ref 0–0.2)
BASOPHILS NFR BLD AUTO: ABNORMAL %
BASOPHILS NFR BLD MANUAL: 0 %
BILIRUB DIRECT SERPL-MCNC: <0.2 MG/DL (ref 0–0.3)
BILIRUB SERPL-MCNC: 0.2 MG/DL
BUN SERPL-MCNC: 8.4 MG/DL (ref 8–23)
CALCIUM SERPL-MCNC: 8 MG/DL (ref 8.8–10.4)
CHLORIDE SERPL-SCNC: 105 MMOL/L (ref 98–107)
CREAT SERPL-MCNC: 0.8 MG/DL (ref 0.67–1.17)
EGFRCR SERPLBLD CKD-EPI 2021: >90 ML/MIN/1.73M2
ELLIPTOCYTES BLD QL SMEAR: SLIGHT
EOSINOPHIL # BLD AUTO: ABNORMAL 10*3/UL
EOSINOPHIL # BLD MANUAL: 0 10E3/UL (ref 0–0.7)
EOSINOPHIL NFR BLD AUTO: ABNORMAL %
EOSINOPHIL NFR BLD MANUAL: 0 %
ERYTHROCYTE [DISTWIDTH] IN BLOOD BY AUTOMATED COUNT: 13.4 % (ref 10–15)
GLUCOSE SERPL-MCNC: 93 MG/DL (ref 70–99)
HCO3 SERPL-SCNC: 25 MMOL/L (ref 22–29)
HCT VFR BLD AUTO: 25.3 % (ref 40–53)
HGB BLD-MCNC: 8.5 G/DL (ref 13.3–17.7)
IMM GRANULOCYTES # BLD: ABNORMAL 10*3/UL
IMM GRANULOCYTES NFR BLD: ABNORMAL %
INR PPP: 1.19 (ref 0.85–1.15)
LACTATE SERPL-SCNC: 1 MMOL/L (ref 0.7–2)
LYMPHOCYTES # BLD AUTO: ABNORMAL 10*3/UL
LYMPHOCYTES # BLD MANUAL: 0.5 10E3/UL (ref 0.8–5.3)
LYMPHOCYTES NFR BLD AUTO: ABNORMAL %
LYMPHOCYTES NFR BLD MANUAL: 10 %
MAGNESIUM SERPL-MCNC: 1.9 MG/DL (ref 1.7–2.3)
MCH RBC QN AUTO: 31.4 PG (ref 26.5–33)
MCHC RBC AUTO-ENTMCNC: 33.6 G/DL (ref 31.5–36.5)
MCV RBC AUTO: 93 FL (ref 78–100)
MONOCYTES # BLD AUTO: ABNORMAL 10*3/UL
MONOCYTES # BLD MANUAL: 0.4 10E3/UL (ref 0–1.3)
MONOCYTES NFR BLD AUTO: ABNORMAL %
MONOCYTES NFR BLD MANUAL: 8 %
MYELOCYTES # BLD MANUAL: 0 10E3/UL
MYELOCYTES NFR BLD MANUAL: 1 %
NEUTROPHILS # BLD AUTO: ABNORMAL 10*3/UL
NEUTROPHILS # BLD MANUAL: 3.8 10E3/UL (ref 1.6–8.3)
NEUTROPHILS NFR BLD AUTO: ABNORMAL %
NEUTROPHILS NFR BLD MANUAL: 81 %
NRBC # BLD AUTO: 0 10E3/UL
NRBC BLD AUTO-RTO: 0 /100
PHOSPHATE SERPL-MCNC: 2.8 MG/DL (ref 2.5–4.5)
PLAT MORPH BLD: ABNORMAL
PLATELET # BLD AUTO: 22 10E3/UL (ref 150–450)
POTASSIUM SERPL-SCNC: 3.7 MMOL/L (ref 3.4–5.3)
PROT SERPL-MCNC: 5.9 G/DL (ref 6.4–8.3)
RBC # BLD AUTO: 2.71 10E6/UL (ref 4.4–5.9)
RBC MORPH BLD: ABNORMAL
SODIUM SERPL-SCNC: 138 MMOL/L (ref 135–145)
WBC # BLD AUTO: 4.7 10E3/UL (ref 4–11)

## 2024-07-29 PROCEDURE — 250N000011 HC RX IP 250 OP 636: Mod: JZ | Performed by: PHYSICIAN ASSISTANT

## 2024-07-29 PROCEDURE — 84100 ASSAY OF PHOSPHORUS: CPT

## 2024-07-29 PROCEDURE — 85610 PROTHROMBIN TIME: CPT

## 2024-07-29 PROCEDURE — 250N000013 HC RX MED GY IP 250 OP 250 PS 637: Performed by: INTERNAL MEDICINE

## 2024-07-29 PROCEDURE — 85027 COMPLETE CBC AUTOMATED: CPT | Performed by: INTERNAL MEDICINE

## 2024-07-29 PROCEDURE — 250N000011 HC RX IP 250 OP 636: Performed by: INTERNAL MEDICINE

## 2024-07-29 PROCEDURE — 250N000012 HC RX MED GY IP 250 OP 636 PS 637: Performed by: PHYSICIAN ASSISTANT

## 2024-07-29 PROCEDURE — 83605 ASSAY OF LACTIC ACID: CPT

## 2024-07-29 PROCEDURE — 250N000011 HC RX IP 250 OP 636: Performed by: STUDENT IN AN ORGANIZED HEALTH CARE EDUCATION/TRAINING PROGRAM

## 2024-07-29 PROCEDURE — 83735 ASSAY OF MAGNESIUM: CPT

## 2024-07-29 PROCEDURE — 250N000011 HC RX IP 250 OP 636

## 2024-07-29 PROCEDURE — 85007 BL SMEAR W/DIFF WBC COUNT: CPT | Performed by: INTERNAL MEDICINE

## 2024-07-29 PROCEDURE — 82248 BILIRUBIN DIRECT: CPT

## 2024-07-29 PROCEDURE — 206N000001 HC R&B BMT UMMC

## 2024-07-29 RX ORDER — POTASSIUM CHLORIDE 29.8 MG/ML
20 INJECTION INTRAVENOUS ONCE
Status: COMPLETED | OUTPATIENT
Start: 2024-07-29 | End: 2024-07-29

## 2024-07-29 RX ORDER — PREDNISONE 20 MG/1
40 TABLET ORAL DAILY
Status: DISCONTINUED | OUTPATIENT
Start: 2024-07-29 | End: 2024-07-30 | Stop reason: HOSPADM

## 2024-07-29 RX ORDER — MAGNESIUM SULFATE HEPTAHYDRATE 40 MG/ML
2 INJECTION, SOLUTION INTRAVENOUS ONCE
Status: COMPLETED | OUTPATIENT
Start: 2024-07-29 | End: 2024-07-29

## 2024-07-29 RX ADMIN — FLUCONAZOLE 200 MG: 200 TABLET ORAL at 07:48

## 2024-07-29 RX ADMIN — ACYCLOVIR 800 MG: 800 TABLET ORAL at 07:48

## 2024-07-29 RX ADMIN — METOPROLOL SUCCINATE 100 MG: 50 TABLET, EXTENDED RELEASE ORAL at 19:36

## 2024-07-29 RX ADMIN — AMLODIPINE BESYLATE 10 MG: 10 TABLET ORAL at 07:48

## 2024-07-29 RX ADMIN — CEFEPIME HYDROCHLORIDE 2 G: 2 INJECTION, POWDER, FOR SOLUTION INTRAVENOUS at 12:32

## 2024-07-29 RX ADMIN — ACYCLOVIR 800 MG: 800 TABLET ORAL at 19:36

## 2024-07-29 RX ADMIN — Medication 5 ML: at 03:39

## 2024-07-29 RX ADMIN — OLANZAPINE 5 MG: 2.5 TABLET, FILM COATED ORAL at 20:30

## 2024-07-29 RX ADMIN — FILGRASTIM-AAFI 480 MCG: 480 INJECTION, SOLUTION INTRAVENOUS; SUBCUTANEOUS at 19:40

## 2024-07-29 RX ADMIN — MAGNESIUM SULFATE HEPTAHYDRATE 2 G: 2 INJECTION, SOLUTION INTRAVENOUS at 05:34

## 2024-07-29 RX ADMIN — POTASSIUM CHLORIDE 20 MEQ: 29.8 INJECTION, SOLUTION INTRAVENOUS at 04:56

## 2024-07-29 RX ADMIN — Medication 5 ML: at 20:32

## 2024-07-29 RX ADMIN — CEFEPIME HYDROCHLORIDE 2 G: 2 INJECTION, POWDER, FOR SOLUTION INTRAVENOUS at 04:54

## 2024-07-29 RX ADMIN — PREDNISONE 40 MG: 20 TABLET ORAL at 10:11

## 2024-07-29 RX ADMIN — PANTOPRAZOLE SODIUM 40 MG: 40 TABLET, DELAYED RELEASE ORAL at 07:48

## 2024-07-29 RX ADMIN — Medication 5 ML: at 07:54

## 2024-07-29 RX ADMIN — CEFEPIME HYDROCHLORIDE 2 G: 2 INJECTION, POWDER, FOR SOLUTION INTRAVENOUS at 19:47

## 2024-07-29 RX ADMIN — Medication 200 MG: at 07:48

## 2024-07-29 RX ADMIN — LISINOPRIL 20 MG: 10 TABLET ORAL at 07:47

## 2024-07-29 ASSESSMENT — ACTIVITIES OF DAILY LIVING (ADL)
ADLS_ACUITY_SCORE: 18

## 2024-07-29 NOTE — PHARMACY-ADMISSION MEDICATION HISTORY
Pharmacist Admission Medication History    Admission medication history is complete. The information provided in this note is only as accurate as the sources available at the time of the update.    Information Source(s): Clinic records and Hospital records via N/A    Pertinent Information: Based on BMT clinic MH from 7/15, and clinic visit notes there after.    Changes made to PTA medication list:  Added: None  Deleted: None  Changed: None    Allergies reviewed with patient and updates made in EHR: yes    Medication History Completed By: Andy J. Kurtzweil, RPH 7/29/2024 7:09 AM    PTA Med List   Medication Sig Last Dose    acyclovir (ZOVIRAX) 800 MG tablet Take 1 tablet (800 mg) by mouth 2 times daily Start Day -1 7/25/2024    allopurinol (ZYLOPRIM) 300 MG tablet Take 1 tablet (300 mg) by mouth daily Start Day -1 7/25/2024    amLODIPine (NORVASC) 10 MG tablet Take 1 tablet (10 mg) by mouth daily 7/25/2024    Cyanocobalamin 1000 MCG/ML KIT Inject 1,000 mcg as directed every 30 days Unknown    fluconazole (DIFLUCAN) 200 MG tablet Take 1 tablet (200 mg) by mouth daily Start Day -1 7/25/2024    heparin lock flush 10 unit/mL Inject 5 mLs as needed for line flush (Inject 5mLs into each lumen daily) Unknown    levofloxacin (LEVAQUIN) 250 MG tablet Take 1 tablet (250 mg) by mouth daily Start Day -1 7/25/2024    lisinopril (ZESTRIL) 10 MG tablet Take 10 mg by mouth every evening 7/24/2024    lisinopril (ZESTRIL) 20 MG tablet Take 20 mg by mouth every morning 7/25/2024    magnesium 200 MG TABS Take 200 mg by mouth daily 7/25/2024    metoprolol succinate ER (TOPROL XL) 100 MG 24 hr tablet Take 100 mg by mouth every evening 7/24/2024    OLANZapine (ZYPREXA) 2.5 MG tablet Take 2 tablets (5 mg) by mouth at bedtime 7/24/2024    ondansetron (ZOFRAN) 8 MG tablet Take 1 tablet (8 mg) by mouth every 8 hours as needed (for nausea / vomiting) 7/24/2024    pantoprazole (PROTONIX) 40 MG EC tablet Take 1 tablet (40 mg) by mouth daily Start  Day -1 7/25/2024    potassium chloride ER (K-TAB/KLOR-CON) 10 MEQ CR tablet Take 10 mEq by mouth 2 times daily 7/25/2024    prochlorperazine (COMPAZINE) 5 MG tablet Take 1-2 tablets (5-10 mg) by mouth every 6 hours as needed for nausea or vomiting Unknown

## 2024-07-29 NOTE — PROGRESS NOTES
Neuro: A&Ox4.   Cardiac: SR. VSS. Afebrile.   Respiratory: Sating >95% on RA.  GI/: Adequate urine output. BM x 1  Diet/appetite: Tolerating regular diet. Eating well.  Activity:  Up ad jonathon to chair and in halls.  Pain: Denied this shift.   Skin: No new deficits noted.  LDA's: R DL CVC    Plan: Continue with POC. Notify primary team with changes.

## 2024-07-29 NOTE — PLAN OF CARE
"A+Ox4, denies pain, nausea. VSS. Tmax 101 at 2327, MD aware. Potassium and magnesium replaced this AM. POC maintained.    Problem: Adult Inpatient Plan of Care  Goal: Plan of Care Review  Description: The Plan of Care Review/Shift note should be completed every shift.  The Outcome Evaluation is a brief statement about your assessment that the patient is improving, declining, or no change.  This information will be displayed automatically on your shift  note.  Outcome: Progressing  Goal: Patient-Specific Goal (Individualized)  Description: You can add care plan individualizations to a care plan. Examples of Individualization might be:  \"Parent requests to be called daily at 9am for status\", \"I have a hard time hearing out of my right ear\", or \"Do not touch me to wake me up as it startles  me\".  Outcome: Progressing  Goal: Absence of Hospital-Acquired Illness or Injury  Outcome: Progressing  Intervention: Identify and Manage Fall Risk  Recent Flowsheet Documentation  Taken 7/29/2024 0339 by Judi Chavez RN  Safety Promotion/Fall Prevention: safety round/check completed  Taken 7/29/2024 0105 by Judi Chavez RN  Safety Promotion/Fall Prevention: safety round/check completed  Taken 7/28/2024 2327 by Judi Chavez RN  Safety Promotion/Fall Prevention: safety round/check completed  Taken 7/28/2024 1944 by Judi Chavez, RN  Safety Promotion/Fall Prevention: safety round/check completed  Intervention: Prevent Skin Injury  Recent Flowsheet Documentation  Taken 7/28/2024 1944 by Judi Chavez, RN  Body Position: position changed independently  Skin Protection: adhesive use limited  Device Skin Pressure Protection: adhesive use limited  Intervention: Prevent and Manage VTE (Venous Thromboembolism) Risk  Recent Flowsheet Documentation  Taken 7/28/2024 1944 by Judi Chavez, RN  VTE Prevention/Management: compression stockings off  Intervention: Prevent Infection  Recent Flowsheet Documentation  Taken 7/29/2024 0339 " by Judi Chavez RN  Infection Prevention:   environmental surveillance performed   equipment surfaces disinfected   hand hygiene promoted  Taken 7/28/2024 2327 by Judi Chavez RN  Infection Prevention:   environmental surveillance performed   equipment surfaces disinfected   hand hygiene promoted  Taken 7/28/2024 1944 by Judi Chavez RN  Infection Prevention:   environmental surveillance performed   equipment surfaces disinfected   hand hygiene promoted  Goal: Optimal Comfort and Wellbeing  Outcome: Progressing  Goal: Readiness for Transition of Care  Outcome: Progressing     Problem: Fever with Neutropenia  Goal: Baseline Body Temperature  Outcome: Progressing  Goal: Absence of Infection  Outcome: Progressing  Intervention: Prevent Infection and Maximize Resistance  Recent Flowsheet Documentation  Taken 7/29/2024 0339 by Judi Chavez RN  Infection Prevention:   environmental surveillance performed   equipment surfaces disinfected   hand hygiene promoted  Taken 7/28/2024 2327 by Judi Chavez RN  Infection Prevention:   environmental surveillance performed   equipment surfaces disinfected   hand hygiene promoted  Taken 7/28/2024 1944 by Judi Chavez RN  Infection Prevention:   environmental surveillance performed   equipment surfaces disinfected   hand hygiene promoted  Oral Care: oral rinse provided  Perineal Care: perineal hygiene encouraged   Goal Outcome Evaluation:

## 2024-07-29 NOTE — PROGRESS NOTES
BMT/Cell Therapy Daily Progress Note   07/29/2024    Patient ID:  Inocente Romero is a 64 year old male, hx of MM. Today is D + 13 s/p HD melphalan and auto HSCT.    Admitted 7/25 for F/N    INTERVAL  HISTORY   Intermittent chilling when he has a fever.  Last fever was 101F at 23:27. No longer constipated.  No hypoxia.    Review of Systems: ROS negative except as noted above.      PHYSICAL EXAM   BP (!) 142/70 (BP Location: Left arm, Cuff Size: Adult Large)   Pulse 98   Temp 98.3  F (36.8  C) (Oral)   Resp 18   Ht 1.829 m (6')   Wt 105.6 kg (232 lb 12.8 oz)   SpO2 97%   BMI 31.57 kg/m      Weight In/Out     Wt Readings from Last 3 Encounters:   07/28/24 105.6 kg (232 lb 12.8 oz)   07/25/24 106.5 kg (234 lb 12.8 oz)   07/24/24 105.1 kg (231 lb 9.6 oz)      [unfilled]     General: NAD   Eyes: sclera anicteric    Lungs: CTAB  Cardiovascular: RRR, no M/R/G  Lymphatics: no edema  Skin: no rashes or petechiae  Neuro: A&O   Additional Findings: line clean and dry    LABS AND IMAGING: I have assessed all abnormal lab values for their clinical significance and any values considered clinically significant have been addressed in the assessment and plan.        Lab Results   Component Value Date    WBC 4.7 07/29/2024    ANEU 3.8 07/29/2024    HGB 8.5 (L) 07/29/2024    HCT 25.3 (L) 07/29/2024    PLT 22 (LL) 07/29/2024     07/29/2024    POTASSIUM 3.7 07/29/2024    CHLORIDE 105 07/29/2024    CO2 25 07/29/2024    GLC 93 07/29/2024    BUN 8.4 07/29/2024    CR 0.80 07/29/2024    MAG 1.9 07/29/2024    INR 1.19 (H) 07/29/2024       SYSTEMS-BASED ASSESSMENT AND PLAN   Inocente Romero is a 64 year old male, hx of MM. Today is D + 13 s/p HD melphalan and auto HSCT.    BMT/IEC PROTOCOL for standard risk MM Auto PBSCT   Pt collected 9.84x10^06 CD34/kg   7/16 transplant; infused dose 3.77 million CD34/kg.      HEME/COAG  - Transfusion parameters: hgb <7g/dL and plts <10,000. 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     ID  - neutropenic fevers: continue cefepime; w/up ntd.  PPx: Fluc, ACV     GI  CINV ppx: zofran/compazine prn. Zypreza at bedtime.  CI diarrhea: c diff neg (7/25)    CV:   - Continue Norvasc, metoprolol.     FEN/Renal:  - noted on KCL 10meq BID.     Medically Ready for Discharge: Anticipated in 1-2 Days    Clinically Significant Risk Factors          # Hypocalcemia: Lowest Ca = 7.9 mg/dL in last 2 days, will monitor and replace as appropriate     # Hypoalbuminemia: Lowest albumin = 3.3 g/dL at 7/29/2024  3:44 AM, will monitor as appropriate    # Coagulation Defect: INR = 1.19 (Ref range: 0.85 - 1.15) and/or PTT = 29 Seconds (Ref range: 22 - 38 Seconds), will monitor for bleeding  # Thrombocytopenia: Lowest platelets = 13 in last 2 days, will monitor for bleeding           #Precipitous drop in Hgb/Hct: Lowest Hgb this hospitalization: 8.5 g/dL. Will continue to monitor and treat/transfuse as appropriate.     # Obesity: Estimated body mass index is 31.57 kg/m  as calculated from the following:    Height as of this encounter: 1.829 m (6').    Weight as of this encounter: 105.6 kg (232 lb 12.8 oz)., PRESENT ON ADMISSION            Medically Ready for Discharge: Anticipated Tomorrow    I spent 30 minutes in the care of this patient today, which included time necessary for preparation for the visit, obtaining history, ordering medications/tests/procedures as medically indicated, review of pertinent medical literature, counseling of the patient, communication of recommendations to the care team, and documentation time.    Aleja Jones PA-C

## 2024-07-29 NOTE — PROVIDER NOTIFICATION
Read - Yesterday - 11:40 pm  FYI:temp 101, 140/67, 94. Currently on cefepime, multiple fevers, multiple cultures from line (last 7/28 @ 0405) and peripheral (last 7/28 @ 0626)  PADMINI Rodríguez - Yesterday - 11:43 pm  noted thanks  WALTER  Read - Yesterday - 11:43 pm  Thank you

## 2024-07-30 VITALS
SYSTOLIC BLOOD PRESSURE: 143 MMHG | RESPIRATION RATE: 16 BRPM | OXYGEN SATURATION: 97 % | TEMPERATURE: 98.2 F | DIASTOLIC BLOOD PRESSURE: 72 MMHG | HEIGHT: 72 IN | HEART RATE: 82 BPM | BODY MASS INDEX: 31.23 KG/M2 | WEIGHT: 230.6 LBS

## 2024-07-30 LAB
ALBUMIN SERPL BCG-MCNC: 3.5 G/DL (ref 3.5–5.2)
ALP SERPL-CCNC: 72 U/L (ref 40–150)
ALT SERPL W P-5'-P-CCNC: 20 U/L (ref 0–70)
ANION GAP SERPL CALCULATED.3IONS-SCNC: 9 MMOL/L (ref 7–15)
AST SERPL W P-5'-P-CCNC: 19 U/L (ref 0–45)
BACTERIA BLD CULT: NO GROWTH
BASOPHILS # BLD AUTO: ABNORMAL 10*3/UL
BASOPHILS # BLD MANUAL: 0 10E3/UL (ref 0–0.2)
BASOPHILS NFR BLD AUTO: ABNORMAL %
BASOPHILS NFR BLD MANUAL: 0 %
BILIRUB DIRECT SERPL-MCNC: <0.2 MG/DL (ref 0–0.3)
BILIRUB SERPL-MCNC: 0.3 MG/DL
BUN SERPL-MCNC: 14.1 MG/DL (ref 8–23)
CALCIUM SERPL-MCNC: 8.5 MG/DL (ref 8.8–10.4)
CHLORIDE SERPL-SCNC: 105 MMOL/L (ref 98–107)
CREAT SERPL-MCNC: 0.69 MG/DL (ref 0.67–1.17)
DACRYOCYTES BLD QL SMEAR: SLIGHT
EGFRCR SERPLBLD CKD-EPI 2021: >90 ML/MIN/1.73M2
EOSINOPHIL # BLD AUTO: ABNORMAL 10*3/UL
EOSINOPHIL # BLD MANUAL: 0 10E3/UL (ref 0–0.7)
EOSINOPHIL NFR BLD AUTO: ABNORMAL %
EOSINOPHIL NFR BLD MANUAL: 0 %
ERYTHROCYTE [DISTWIDTH] IN BLOOD BY AUTOMATED COUNT: 13.6 % (ref 10–15)
GLUCOSE SERPL-MCNC: 108 MG/DL (ref 70–99)
HCO3 SERPL-SCNC: 24 MMOL/L (ref 22–29)
HCT VFR BLD AUTO: 27.6 % (ref 40–53)
HGB BLD-MCNC: 9.3 G/DL (ref 13.3–17.7)
IMM GRANULOCYTES # BLD: ABNORMAL 10*3/UL
IMM GRANULOCYTES NFR BLD: ABNORMAL %
INR PPP: 1.1 (ref 0.85–1.15)
LYMPHOCYTES # BLD AUTO: ABNORMAL 10*3/UL
LYMPHOCYTES # BLD MANUAL: 0.5 10E3/UL (ref 0.8–5.3)
LYMPHOCYTES NFR BLD AUTO: ABNORMAL %
LYMPHOCYTES NFR BLD MANUAL: 4 %
MAGNESIUM SERPL-MCNC: 2 MG/DL (ref 1.7–2.3)
MCH RBC QN AUTO: 31.5 PG (ref 26.5–33)
MCHC RBC AUTO-ENTMCNC: 33.7 G/DL (ref 31.5–36.5)
MCV RBC AUTO: 94 FL (ref 78–100)
METAMYELOCYTES # BLD MANUAL: 0.1 10E3/UL
METAMYELOCYTES NFR BLD MANUAL: 1 %
MONOCYTES # BLD AUTO: ABNORMAL 10*3/UL
MONOCYTES # BLD MANUAL: 1.2 10E3/UL (ref 0–1.3)
MONOCYTES NFR BLD AUTO: ABNORMAL %
MONOCYTES NFR BLD MANUAL: 9 %
NEUTROPHILS # BLD AUTO: ABNORMAL 10*3/UL
NEUTROPHILS # BLD MANUAL: 11.4 10E3/UL (ref 1.6–8.3)
NEUTROPHILS NFR BLD AUTO: ABNORMAL %
NEUTROPHILS NFR BLD MANUAL: 86 %
NRBC # BLD AUTO: 0 10E3/UL
NRBC BLD AUTO-RTO: 0 /100
PHOSPHATE SERPL-MCNC: 2.5 MG/DL (ref 2.5–4.5)
PLAT MORPH BLD: ABNORMAL
PLATELET # BLD AUTO: 36 10E3/UL (ref 150–450)
POLYCHROMASIA BLD QL SMEAR: SLIGHT
POTASSIUM SERPL-SCNC: 4.3 MMOL/L (ref 3.4–5.3)
PROT SERPL-MCNC: 6.4 G/DL (ref 6.4–8.3)
RBC # BLD AUTO: 2.95 10E6/UL (ref 4.4–5.9)
RBC MORPH BLD: ABNORMAL
SODIUM SERPL-SCNC: 138 MMOL/L (ref 135–145)
TOXIC GRANULES BLD QL SMEAR: PRESENT
WBC # BLD AUTO: 13.2 10E3/UL (ref 4–11)

## 2024-07-30 PROCEDURE — 85610 PROTHROMBIN TIME: CPT

## 2024-07-30 PROCEDURE — 250N000011 HC RX IP 250 OP 636: Performed by: INTERNAL MEDICINE

## 2024-07-30 PROCEDURE — 82248 BILIRUBIN DIRECT: CPT

## 2024-07-30 PROCEDURE — 250N000012 HC RX MED GY IP 250 OP 636 PS 637: Performed by: PHYSICIAN ASSISTANT

## 2024-07-30 PROCEDURE — 250N000013 HC RX MED GY IP 250 OP 250 PS 637: Performed by: INTERNAL MEDICINE

## 2024-07-30 PROCEDURE — 82374 ASSAY BLOOD CARBON DIOXIDE: CPT | Performed by: INTERNAL MEDICINE

## 2024-07-30 PROCEDURE — 85027 COMPLETE CBC AUTOMATED: CPT | Performed by: INTERNAL MEDICINE

## 2024-07-30 PROCEDURE — 84100 ASSAY OF PHOSPHORUS: CPT

## 2024-07-30 PROCEDURE — 83735 ASSAY OF MAGNESIUM: CPT

## 2024-07-30 PROCEDURE — 250N000013 HC RX MED GY IP 250 OP 250 PS 637

## 2024-07-30 PROCEDURE — 85007 BL SMEAR W/DIFF WBC COUNT: CPT | Performed by: INTERNAL MEDICINE

## 2024-07-30 RX ORDER — LEVOFLOXACIN 250 MG/1
250 TABLET, FILM COATED ORAL DAILY
Qty: 30 TABLET | Refills: 0 | Status: SHIPPED | OUTPATIENT
Start: 2024-07-30 | End: 2024-08-05

## 2024-07-30 RX ORDER — MAGNESIUM OXIDE 400 MG/1
400 TABLET ORAL EVERY 4 HOURS
Status: COMPLETED | OUTPATIENT
Start: 2024-07-30 | End: 2024-07-30

## 2024-07-30 RX ORDER — FLUCONAZOLE 200 MG/1
100 TABLET ORAL DAILY
COMMUNITY
Start: 2024-07-30 | End: 2024-08-05

## 2024-07-30 RX ORDER — OLANZAPINE 2.5 MG/1
5 TABLET, FILM COATED ORAL
COMMUNITY
Start: 2024-07-30

## 2024-07-30 RX ORDER — PREDNISONE 20 MG/1
40 TABLET ORAL DAILY
Qty: 4 TABLET | Refills: 0 | Status: SHIPPED | OUTPATIENT
Start: 2024-07-31 | End: 2024-08-05

## 2024-07-30 RX ADMIN — POTASSIUM & SODIUM PHOSPHATES POWDER PACK 280-160-250 MG 1 PACKET: 280-160-250 PACK at 07:43

## 2024-07-30 RX ADMIN — POTASSIUM & SODIUM PHOSPHATES POWDER PACK 280-160-250 MG 1 PACKET: 280-160-250 PACK at 10:48

## 2024-07-30 RX ADMIN — AMLODIPINE BESYLATE 10 MG: 10 TABLET ORAL at 07:42

## 2024-07-30 RX ADMIN — ACYCLOVIR 800 MG: 800 TABLET ORAL at 07:42

## 2024-07-30 RX ADMIN — LISINOPRIL 20 MG: 10 TABLET ORAL at 07:42

## 2024-07-30 RX ADMIN — MAGNESIUM OXIDE TAB 400 MG (241.3 MG ELEMENTAL MG) 400 MG: 400 (241.3 MG) TAB at 10:48

## 2024-07-30 RX ADMIN — FLUCONAZOLE 200 MG: 200 TABLET ORAL at 07:42

## 2024-07-30 RX ADMIN — CEFEPIME HYDROCHLORIDE 2 G: 2 INJECTION, POWDER, FOR SOLUTION INTRAVENOUS at 04:25

## 2024-07-30 RX ADMIN — PREDNISONE 40 MG: 20 TABLET ORAL at 07:42

## 2024-07-30 RX ADMIN — MAGNESIUM OXIDE TAB 400 MG (241.3 MG ELEMENTAL MG) 400 MG: 400 (241.3 MG) TAB at 07:43

## 2024-07-30 RX ADMIN — Medication 200 MG: at 07:43

## 2024-07-30 RX ADMIN — PANTOPRAZOLE SODIUM 40 MG: 40 TABLET, DELAYED RELEASE ORAL at 07:42

## 2024-07-30 ASSESSMENT — ACTIVITIES OF DAILY LIVING (ADL)
ADLS_ACUITY_SCORE: 18

## 2024-07-30 NOTE — PLAN OF CARE
BP (!) 142/79 (BP Location: Left arm)   Pulse 82   Temp 98.1  F (36.7  C) (Oral)   Resp 16   Ht 1.829 m (6')   Wt 104.2 kg (229 lb 12.8 oz)   SpO2 100%   BMI 31.17 kg/m      Neuro: A&Ox4.   Cardiac: No tele order. VSS.   Respiratory: Sating >92% on RA.  GI/: Voiding spontaneously. No BM this shift.   Diet/appetite: Regular diet.  Activity: Up ad jonathon.  Pain: Denied.  Skin: No new deficits noted.  LDA's: DL CVC.    Plan: No acute events overnight. No pt complaints this shift. Will need PO mag and phos replacements this AM. Continue with POC. Notify primary team with changes.    Goal Outcome Evaluation:      Plan of Care Reviewed With: patient                  

## 2024-07-30 NOTE — DISCHARGE INSTRUCTIONS
BMT Contact Information  For issues including fevers 100.5 or more:  Please call during the week: Monday through Friday between hours of 8:00 am and 4:30 pm- Call BMT office- [phone]  After hours/Weekends: Please call [facility]  and ask for BMT physician on call and the  will have the BMT Fellow Physician call you back: [phone]       Advice for Patients on COVID19:  a. Avoid contact with individuals:   i. Who are sick or have recently been sick  ii. Have traveled to high risk areas (per CDC guidelines) or have been on a cruise in the last 14 days  iii. Who were or could have been exposed to COVID-19   b. If experiencing symptoms such as: Fever, cough or shortness of breath contact BMT at [phone] Mon-Fri 8am-4:30pm or After Hours at [phone] (ask to speak to a BMT Fellow) for guidance on need for clinical assessment  c. Avoid all non- essential travel at this time; if traveling is necessary use mask (N-95)   d. Wear a mask when in public areas  d. Avoid crowded places, if possible  f. Follow CDC advice https://www.cdc.gov/coronavirus/2019-ncov/index.html and travel guidelines https://www.cdc.gov/coronavirus/2019-ncov/travelers/index.html

## 2024-07-30 NOTE — PROGRESS NOTES
Care Management Discharge Note    Discharge Date: 07/30/2024     Discharge Disposition:  Home  [address] [place] [address]     Discharge Services/DME:  See RNCC notes for community discharge details.    Discharge Transportation:  family will provide    Private pay costs discussed:  See RNCC notes for community discharge details.    Does the patient's insurance plan have a 3 day qualifying hospital stay waiver?  No    PAS Confirmation Code:  NA  Patient/family educated on Medicare website which has current facility and service quality ratings:  See RNCC notes for community discharge details.    Education Provided on the Discharge Plan:  Yes  Persons Notified of Discharge Plans: Pt, Pt's wife, IDT  Patient/Family in Agreement with the Plan:  YES    Handoff Referral Completed:  N/A    Additional Information:  Inocente Romero is a 64 year old male, hx of MM. Today is D + 1 s/p HD melphalan and auto HSCT.    Per Medical team, pt is anticipated to be medically stable for discharge today 7/30. SW met with pt and his wife briefly at bedside to assess coping and provide psychosocial support  as needed. Pt and his wife state he has no psychosocial needs at this time. Pt is looking forward to discharging home today. SW provided empathetic listening, validation of concerns, and encouragement. Pt declined/questions or concerns. SW encouraged pt to contact  for support, questions and/or resources.    ANALI Hughes, [facility]  Adult Blood & Marrow Transplant   Phone: [phone]  VOCERA Searchable at BMT SW 2

## 2024-07-30 NOTE — PROGRESS NOTES
Provided pt care x 4 hrs 2954-1301  S-pt up in room independent. Afebrile- aware may be discharged if remains afebrile. Eating well. Reports no pain nausea or concerns at this time.   B-Multiple Myeloma  A-day 13 post tx  R-transition to out pt if vss and no temp-

## 2024-07-30 NOTE — PROGRESS NOTES
Pt discharged to: home  Via: private vehicle  Time: 1110  Reason not before 11am or 2 hrs after order written: N/A, within 2 hours after order written  Accompanied by: wife  Belongings: taken with patient  Teaching: AVS reviewed with patient and caregiver, all questions answered.   Clinic appointment: Tomorrow, July 31st at 0715

## 2024-07-30 NOTE — DISCHARGE SUMMARY
[facility] Discharge Summary   Inocente Romero MRN# [id number]   Age: 64 year old  YOB: 1960   Date of Admission: 7/25/2024  Date of Discharge:  7/30/2024   Admitting Physician: Titi Santiago MD  Discharge Physician:  Roxana Zelaya  Discharge Diagnoses:    Neutropenic fever  Multiple myeloma  S/p auto stem cell transplant  Engraftment syndrome  Discharge Medications:         Medication List        Started      predniSONE 20 MG tablet  Commonly known as: DELTASONE  40 mg, Oral, DAILY, Take on 7/31 and 8/1; then stop.  Start taking on: July 31, 2024            Modified      fluconazole 200 MG tablet  Commonly known as: DIFLUCAN  What changed: how much to take     levofloxacin 250 MG tablet  Commonly known as: Levaquin  250 mg, Oral, DAILY, Continue on 7/31 and 8/1 (until prednisone is done)  What changed: additional instructions     lisinopril 20 MG tablet  Commonly known as: ZESTRIL  What changed: Another medication with the same name was removed. Continue taking this medication, and follow the directions you see here.     OLANZapine 2.5 MG tablet  Commonly known as: zyPREXA  What changed:   when to take this  reasons to take this            Discontinued      allopurinol 300 MG tablet  Commonly known as: ZYLOPRIM            Brief History of Illness:    **Adopted from H&P  Inocente Romero is a 64 year old male who presents with new onset of fever (Tmax 101.7F) accompanied by chills 2-3 hours after receiving platelet transfusion at [facility] 7/25 around 0900. He also has a mild headache rated 2/10 around right periorbital area that comes and goes, which also started today 7/25. He denies any changes in vision, difficulty swallowing, cough, chest pain, shortness of breath, heart palpitations though he did feel like his heart rate was beating too fast in the afternoon (110s per patient's wife). He reports weakness in his body, which is at baseline and which started after BMT therapy. He is  able to walk on his own without assistance. He denies any dizziness. Has tinnitus at baseline. He has abdominal cramping, which is at his baseline and a chronic complaint over the past several years. He denies any constipation and diarrhea. Last BM was 7/25 AM.     Physical Exam:    BP (!) 143/72 (BP Location: Left arm, Cuff Size: Adult Regular)   Pulse 82   Temp 98.2  F (36.8  C) (Oral)   Resp 16   Ht 1.829 m (6')   Wt 104.6 kg (230 lb 9.6 oz)   SpO2 97%   BMI 31.27 kg/m    # Discharge Pain Plan:    - Patient currently has NO PAIN and is not being prescribed pain medications on discharge.    General Appearance: well appearing, NAD.   HEENT: sclera anicteric, EOMI. Moist mucus membranes, no ulcerations or thrush.   CV: RRR, no murmur or rub.   RESP: CTA bilaterally; no rales or wheezes.  EXT: no edema cyndie LE's  SKIN:  No rash or lesions on exposed areas.  Petechiae on lower legs.   NEURO: A&O x3; CN II-XII grossly intact.  PSYCH: Appropriate affect       Lab Values     Lab Results   Component Value Date    WBC 13.2 (H) 07/30/2024    ANEU 11.4 (H) 07/30/2024    HGB 9.3 (L) 07/30/2024    HCT 27.6 (L) 07/30/2024    PLT 36 (LL) 07/30/2024     07/30/2024    POTASSIUM 4.3 07/30/2024    CHLORIDE 105 07/30/2024    CO2 24 07/30/2024     (H) 07/30/2024    BUN 14.1 07/30/2024    CR 0.69 07/30/2024    MAG 2.0 07/30/2024    INR 1.10 07/30/2024    BILITOTAL 0.3 07/30/2024    AST 19 07/30/2024    ALT 20 07/30/2024    ALKPHOS 72 07/30/2024    PROTTOTAL 6.4 07/30/2024    ALBUMIN 3.5 07/30/2024       I have assessed all abnormal lab values for their clinical significance and any values considered clinically significant have been addressed in the assessment and plan.     Hospital Course:    Inocente Romero is a 64 year old male, hx of MM. Today is D + 13 s/p HD melphalan and auto HSCT.  BMT/IEC PROTOCOL for standard risk MM Auto PBSCT   Pt collected 9.84x10^06 CD34/kg   7/16 transplant; infused dose 3.77  million CD34/kg.   HEME/COAG  - Transfusion parameters: hgb <7g/dL and plts <10,000. Platelets 7/25  - 7/29 last dose GCSF  - pred 40mg daily 7/29-8/1 for engraftment syndrome.   ID  - neutropenic fevers: work up negative.  Stopped cefepime 7/30; will continue on prophy levaquin for two more days, since he will be on prednisone 2 more days.   PPx: Fluc, ACV   GI  CINV ppx: zofran/compazine prn. Zypreza at bedtime.  CI diarrhea: c diff neg (7/25)  CV:   - Continue Norvasc, metoprolol.   FEN/Renal:  - noted on KCL 10meq BID.   - okay to continue mag; will check mag level in clinic     Clinically Significant Risk Factors          # Hypocalcemia: Lowest Ca = 8 mg/dL in last 2 days, will monitor and replace as appropriate     # Hypoalbuminemia: Lowest albumin = 3.3 g/dL at 7/29/2024  3:44 AM, will monitor as appropriate   # Thrombocytopenia: Lowest platelets = 22 in last 2 days, will monitor for bleeding           #Precipitous drop in Hgb/Hct: Lowest Hgb this hospitalization: 8.5 g/dL. Will continue to monitor and treat/transfuse as appropriate.     # Obesity: Estimated body mass index is 31.27 kg/m  as calculated from the following:    Height as of this encounter: 1.829 m (6').    Weight as of this encounter: 104.6 kg (230 lb 9.6 oz).             CODE STATUS: FULL  Discharge Instructions and Follow-Up:    Discharge diet: Regular diet as tolerated  Discharge activity: Activity as tolerated   Discharge follow-up: Follow up with BMT Clinic 7/31/2024    Discharge Disposition:    Discharged to home.    Aleja Jones PA-C  7/30/2024    I spent 45 minutes in the care of this patient today, which included time necessary for preparation for the visit, obtaining history, ordering medications/tests/procedures as medically indicated, review of pertinent medical literature, counseling of the patient, communication of recommendations to the care team, and documentation time.

## 2024-07-31 ENCOUNTER — APPOINTMENT (OUTPATIENT)
Dept: LAB | Facility: CLINIC | Age: 64
End: 2024-07-31
Attending: INTERNAL MEDICINE
Payer: COMMERCIAL

## 2024-07-31 ENCOUNTER — ONCOLOGY VISIT (OUTPATIENT)
Dept: TRANSPLANT | Facility: CLINIC | Age: 64
End: 2024-07-31
Attending: INTERNAL MEDICINE
Payer: COMMERCIAL

## 2024-07-31 ENCOUNTER — TELEPHONE (OUTPATIENT)
Dept: TRANSPLANT | Facility: CLINIC | Age: 64
End: 2024-07-31

## 2024-07-31 VITALS
WEIGHT: 232.9 LBS | SYSTOLIC BLOOD PRESSURE: 159 MMHG | BODY MASS INDEX: 31.59 KG/M2 | RESPIRATION RATE: 18 BRPM | TEMPERATURE: 98 F | HEART RATE: 84 BPM | OXYGEN SATURATION: 98 % | DIASTOLIC BLOOD PRESSURE: 78 MMHG

## 2024-07-31 DIAGNOSIS — C90.00 MULTIPLE MYELOMA, REMISSION STATUS UNSPECIFIED (H): ICD-10-CM

## 2024-07-31 LAB
ANION GAP SERPL CALCULATED.3IONS-SCNC: 11 MMOL/L (ref 7–15)
BASOPHILS # BLD AUTO: ABNORMAL 10*3/UL
BASOPHILS # BLD MANUAL: 0 10E3/UL (ref 0–0.2)
BASOPHILS NFR BLD AUTO: ABNORMAL %
BASOPHILS NFR BLD MANUAL: 0 %
BUN SERPL-MCNC: 28.2 MG/DL (ref 8–23)
CALCIUM SERPL-MCNC: 9.2 MG/DL (ref 8.8–10.4)
CHLORIDE SERPL-SCNC: 107 MMOL/L (ref 98–107)
CREAT SERPL-MCNC: 0.88 MG/DL (ref 0.67–1.17)
EGFRCR SERPLBLD CKD-EPI 2021: >90 ML/MIN/1.73M2
EOSINOPHIL # BLD AUTO: ABNORMAL 10*3/UL
EOSINOPHIL # BLD MANUAL: 0 10E3/UL (ref 0–0.7)
EOSINOPHIL NFR BLD AUTO: ABNORMAL %
EOSINOPHIL NFR BLD MANUAL: 0 %
ERYTHROCYTE [DISTWIDTH] IN BLOOD BY AUTOMATED COUNT: 13.8 % (ref 10–15)
GLUCOSE SERPL-MCNC: 147 MG/DL (ref 70–99)
HCO3 SERPL-SCNC: 24 MMOL/L (ref 22–29)
HCT VFR BLD AUTO: 28.3 % (ref 40–53)
HGB BLD-MCNC: 9.4 G/DL (ref 13.3–17.7)
IMM GRANULOCYTES # BLD: ABNORMAL 10*3/UL
IMM GRANULOCYTES NFR BLD: ABNORMAL %
LYMPHOCYTES # BLD AUTO: ABNORMAL 10*3/UL
LYMPHOCYTES # BLD MANUAL: 1.7 10E3/UL (ref 0.8–5.3)
LYMPHOCYTES NFR BLD AUTO: ABNORMAL %
LYMPHOCYTES NFR BLD MANUAL: 12 %
MAGNESIUM SERPL-MCNC: 1.9 MG/DL (ref 1.7–2.3)
MCH RBC QN AUTO: 31.5 PG (ref 26.5–33)
MCHC RBC AUTO-ENTMCNC: 33.2 G/DL (ref 31.5–36.5)
MCV RBC AUTO: 95 FL (ref 78–100)
METAMYELOCYTES # BLD MANUAL: 0.1 10E3/UL
METAMYELOCYTES NFR BLD MANUAL: 1 %
MONOCYTES # BLD AUTO: ABNORMAL 10*3/UL
MONOCYTES # BLD MANUAL: 1 10E3/UL (ref 0–1.3)
MONOCYTES NFR BLD AUTO: ABNORMAL %
MONOCYTES NFR BLD MANUAL: 7 %
NEUTROPHILS # BLD AUTO: ABNORMAL 10*3/UL
NEUTROPHILS # BLD MANUAL: 11.5 10E3/UL (ref 1.6–8.3)
NEUTROPHILS NFR BLD AUTO: ABNORMAL %
NEUTROPHILS NFR BLD MANUAL: 80 %
NRBC # BLD AUTO: 0.1 10E3/UL
NRBC BLD AUTO-RTO: 0 /100
PLAT MORPH BLD: ABNORMAL
PLATELET # BLD AUTO: 59 10E3/UL (ref 150–450)
POTASSIUM SERPL-SCNC: 3.8 MMOL/L (ref 3.4–5.3)
RBC # BLD AUTO: 2.98 10E6/UL (ref 4.4–5.9)
RBC MORPH BLD: ABNORMAL
SODIUM SERPL-SCNC: 142 MMOL/L (ref 135–145)
WBC # BLD AUTO: 14.4 10E3/UL (ref 4–11)

## 2024-07-31 PROCEDURE — 85007 BL SMEAR W/DIFF WBC COUNT: CPT

## 2024-07-31 PROCEDURE — 250N000011 HC RX IP 250 OP 636: Performed by: INTERNAL MEDICINE

## 2024-07-31 PROCEDURE — 99214 OFFICE O/P EST MOD 30 MIN: CPT

## 2024-07-31 PROCEDURE — 83735 ASSAY OF MAGNESIUM: CPT

## 2024-07-31 PROCEDURE — 36415 COLL VENOUS BLD VENIPUNCTURE: CPT

## 2024-07-31 PROCEDURE — G2211 COMPLEX E/M VISIT ADD ON: HCPCS

## 2024-07-31 PROCEDURE — 82374 ASSAY BLOOD CARBON DIOXIDE: CPT

## 2024-07-31 PROCEDURE — G0463 HOSPITAL OUTPT CLINIC VISIT: HCPCS

## 2024-07-31 PROCEDURE — 85014 HEMATOCRIT: CPT

## 2024-07-31 RX ORDER — HEPARIN SODIUM,PORCINE 10 UNIT/ML
5 VIAL (ML) INTRAVENOUS
Status: DISCONTINUED | OUTPATIENT
Start: 2024-07-31 | End: 2024-07-31 | Stop reason: HOSPADM

## 2024-07-31 RX ADMIN — Medication 5 ML: at 07:13

## 2024-07-31 ASSESSMENT — PAIN SCALES - GENERAL: PAINLEVEL: NO PAIN (0)

## 2024-07-31 NOTE — NURSING NOTE
Chief Complaint   Patient presents with    Blood Draw     Labs drawn via CVC by RN.      Labs drawn via CVC by RN. Caps changed. Flushed with saline and heparin. Pt tolerated well. Vitals taken. Pt checked into next appointment.    Jenny Jimenez RN

## 2024-07-31 NOTE — NURSING NOTE
Oncology Rooming Note    July 31, 2024 7:51 AM   Inocente Romero is a 64 year old male who presents for:    Chief Complaint   Patient presents with    Blood Draw     Labs drawn via CVC by RN.     Oncology Clinic Visit     Post bmt for MM here for labs and md visit     Initial Vitals: BP (!) 159/78   Pulse 84   Temp 98  F (36.7  C) (Oral)   Resp 18   Wt 105.6 kg (232 lb 14.4 oz)   SpO2 98%   BMI 31.59 kg/m   Estimated body mass index is 31.59 kg/m  as calculated from the following:    Height as of 7/25/24: 1.829 m (6').    Weight as of this encounter: 105.6 kg (232 lb 14.4 oz). Body surface area is 2.32 meters squared.  No Pain (0) Comment: Data Unavailable   No LMP for male patient.  Allergies reviewed: Yes  Medications reviewed: Yes    Medications: Medication refills not needed today.  Pharmacy name entered into EPIC: [facility] - [place], [place] - [address]    Frailty Screening:   Is the patient here for a new oncology consult visit in cancer care? 2. No      Clinical concerns: none       Abbie Smyth RN

## 2024-07-31 NOTE — PROGRESS NOTES
BMT/Cell Therapy Daily Progress Note         Patient ID:  Inocente Romero is a 64 year old male, hx of MM. Today is D + 15 s/p HD melphalan and auto HSCT.     Admitted 7/25 for F/N     INTERVAL  HISTORY   Discharged from  yesterday. Feeling much better. Mild nausea in the AM but no nausea throughout the day. Already ate a bowl of cereal this morning. No diarrhea. Still on pred through tomorrow.      Review of Systems: ROS negative except as noted above.        PHYSICAL EXAM   Blood pressure (!) 159/78, pulse 84, temperature 98  F (36.7  C), temperature source Oral, resp. rate 18, weight 105.6 kg (232 lb 14.4 oz), SpO2 98%.    Weight In/Out          Wt Readings from Last 3 Encounters:   07/28/24 105.6 kg (232 lb 12.8 oz)   07/25/24 106.5 kg (234 lb 12.8 oz)   07/24/24 105.1 kg (231 lb 9.6 oz)       [unfilled]      General: NAD   Eyes: sclera anicteric    Lungs: CTAB  Cardiovascular: RRR, no M/R/G  Lymphatics: no edema  Skin:  petechiae on ankles  Neuro: A&O   Additional Findings: line clean and dry     LABS AND IMAGING: I have assessed all abnormal lab values for their clinical significance and any values considered clinically significant have been addressed in the assessment and plan.          Lab Results   Component Value Date    WBC 14.4 (H) 07/31/2024    ANEU 11.5 (H) 07/31/2024    HGB 9.4 (L) 07/31/2024    HCT 28.3 (L) 07/31/2024    PLT 59 (L) 07/31/2024     07/31/2024    POTASSIUM 3.8 07/31/2024    CHLORIDE 107 07/31/2024    CO2 24 07/31/2024     (H) 07/31/2024    BUN 28.2 (H) 07/31/2024    CR 0.88 07/31/2024    MAG 1.9 07/31/2024    INR 1.10 07/30/2024          SYSTEMS-BASED ASSESSMENT AND PLAN   Inocente Romero is a 64 year old male, hx of MM. Today is D + 15 s/p HD melphalan and auto HSCT.      BMT/IEC PROTOCOL for standard risk MM Auto PBSCT   Pt collected 9.84x10^06 CD34/kg   7/16 transplant; infused dose 3.77 million CD34/kg.     HEME/COAG  - Transfusion parameters: hgb <7g/dL  and plts <10,000. Platelets 7/25  - 7/29 last dose GCSF  - pred 40mg daily 7/29-8/1 for engraftment syndrome.     ID  - neutropenic fevers: work up negative.  Stopped cefepime 7/30; will continue on prophy levaquin for two more days, since he will be on prednisone 2 more days.(Last day 8/1)   PPx: Fluc, ACV     GI  CINV ppx: zofran/compazine prn. Zypreza at bedtime.  CI diarrhea: c diff neg (7/25)    CV:   - Continue Norvasc, metoprolol.     FEN/Renal:  - noted on KCL 10meq BID.   - okay to continue mag; mg okay today.     Plan:  Pred/levaquin to end 8/1  engrafting    RETURN TO CLINIC:  Monday for follow up. Call earlier prn    I spent 30 minutes in the care of this patient today, which included time necessary for preparation for the visit, obtaining history, ordering medications/tests/procedures as medically indicated, review of pertinent medical literature, counseling of the patient, communication of recommendations to the care team, and documentation time.    The longitudinal plan of care for the diagnosis(es)/condition(s) as documented were addressed during this visit. Due to the added complexity in care, I will continue to support Felix in the subsequent management and with ongoing continuity of care.      Jodi Herring NP

## 2024-07-31 NOTE — TELEPHONE ENCOUNTER
----- Message from Ileana ARMAS sent at 7/19/2024 12:47 PM CDT -----  Regarding: DAY 0 OP Transplant Notification  Felix received their outpatient auto BMT in clinic on 7/16 (date) and is ready to schedule for BANs.    Patient needs sedated marrows for BANS: No    ClonoSEQ testing needed? Yes    Line Company:  [employer]     Ph[facility] concerns:  [facility]    D/c location:  Home    Other info/concerns:  n/a    Caregiver:  Wife      Long-term BMT MD:  Raegan  Long-term BMT NC:  Ileana HERNANDEZ :  E[place]

## 2024-07-31 NOTE — LETTER
7/31/2024      Inocente Romero  [address] [place] [address]      Dear Colleague,    Thank you for referring your patient, Inocente Romero, to the [facility] BLOOD AND MARROW TRANSPLANT PROGRAM [place]. Please see a copy of my visit note below.    BMT/Cell Therapy Daily Progress Note         Patient ID:  Inocente Romero is a 64 year old male, hx of MM. Today is D + 15 s/p HD melphalan and auto HSCT.     Admitted 7/25 for F/N     INTERVAL  HISTORY   Discharged from  yesterday. Feeling much better. Mild nausea in the AM but no nausea throughout the day. Already ate a bowl of cereal this morning. No diarrhea. Still on pred through tomorrow.      Review of Systems: ROS negative except as noted above.        PHYSICAL EXAM   Blood pressure (!) 159/78, pulse 84, temperature 98  F (36.7  C), temperature source Oral, resp. rate 18, weight 105.6 kg (232 lb 14.4 oz), SpO2 98%.    Weight In/Out          Wt Readings from Last 3 Encounters:   07/28/24 105.6 kg (232 lb 12.8 oz)   07/25/24 106.5 kg (234 lb 12.8 oz)   07/24/24 105.1 kg (231 lb 9.6 oz)       [unfilled]      General: NAD   Eyes: sclera anicteric    Lungs: CTAB  Cardiovascular: RRR, no M/R/G  Lymphatics: no edema  Skin:  petechiae on ankles  Neuro: A&O   Additional Findings: line clean and dry     LABS AND IMAGING: I have assessed all abnormal lab values for their clinical significance and any values considered clinically significant have been addressed in the assessment and plan.          Lab Results   Component Value Date    WBC 14.4 (H) 07/31/2024    ANEU 11.5 (H) 07/31/2024    HGB 9.4 (L) 07/31/2024    HCT 28.3 (L) 07/31/2024    PLT 59 (L) 07/31/2024     07/31/2024    POTASSIUM 3.8 07/31/2024    CHLORIDE 107 07/31/2024    CO2 24 07/31/2024     (H) 07/31/2024    BUN 28.2 (H) 07/31/2024    CR 0.88 07/31/2024    MAG 1.9 07/31/2024    INR 1.10 07/30/2024          SYSTEMS-BASED ASSESSMENT AND PLAN   Inocente Romero  is a 64 year old male, hx of MM. Today is D + 15 s/p HD melphalan and auto HSCT.      BMT/IEC PROTOCOL for standard risk MM Auto PBSCT   Pt collected 9.84x10^06 CD34/kg   7/16 transplant; infused dose 3.77 million CD34/kg.     HEME/COAG  - Transfusion parameters: hgb <7g/dL and plts <10,000. Platelets 7/25  - 7/29 last dose GCSF  - pred 40mg daily 7/29-8/1 for engraftment syndrome.     ID  - neutropenic fevers: work up negative.  Stopped cefepime 7/30; will continue on prophy levaquin for two more days, since he will be on prednisone 2 more days.(Last day 8/1)   PPx: Fluc, ACV     GI  CINV ppx: zofran/compazine prn. Zypreza at bedtime.  CI diarrhea: c diff neg (7/25)    CV:   - Continue Norvasc, metoprolol.     FEN/Renal:  - noted on KCL 10meq BID.   - okay to continue mag; mg okay today.     Plan:  Pred/levaquin to end 8/1  engrafting    RETURN TO CLINIC:  Monday for follow up. Call earlier prn    I spent 30 minutes in the care of this patient today, which included time necessary for preparation for the visit, obtaining history, ordering medications/tests/procedures as medically indicated, review of pertinent medical literature, counseling of the patient, communication of recommendations to the care team, and documentation time.    The longitudinal plan of care for the diagnosis(es)/condition(s) as documented were addressed during this visit. Due to the added complexity in care, I will continue to support Felix in the subsequent management and with ongoing continuity of care.      Jodi Herring NP      Again, thank you for allowing me to participate in the care of your patient.        Sincerely,        BMT Advanced Practice Provider

## 2024-08-01 LAB — BACTERIA BLD CULT: NO GROWTH

## 2024-08-02 LAB
BACTERIA BLD CULT: NO GROWTH
BACTERIA BLD CULT: NO GROWTH

## 2024-08-05 ENCOUNTER — APPOINTMENT (OUTPATIENT)
Dept: LAB | Facility: CLINIC | Age: 64
End: 2024-08-05
Attending: INTERNAL MEDICINE
Payer: COMMERCIAL

## 2024-08-05 ENCOUNTER — INFUSION THERAPY VISIT (OUTPATIENT)
Dept: TRANSPLANT | Facility: CLINIC | Age: 64
End: 2024-08-05
Attending: INTERNAL MEDICINE
Payer: COMMERCIAL

## 2024-08-05 VITALS
OXYGEN SATURATION: 98 % | BODY MASS INDEX: 30.65 KG/M2 | SYSTOLIC BLOOD PRESSURE: 137 MMHG | RESPIRATION RATE: 16 BRPM | TEMPERATURE: 98 F | WEIGHT: 226 LBS | HEART RATE: 105 BPM | DIASTOLIC BLOOD PRESSURE: 76 MMHG

## 2024-08-05 DIAGNOSIS — Z94.81 STATUS POST BONE MARROW TRANSPLANT (H): Primary | ICD-10-CM

## 2024-08-05 DIAGNOSIS — C90.00 MULTIPLE MYELOMA, REMISSION STATUS UNSPECIFIED (H): ICD-10-CM

## 2024-08-05 LAB
ALBUMIN SERPL BCG-MCNC: 3.8 G/DL (ref 3.5–5.2)
ALP SERPL-CCNC: 63 U/L (ref 40–150)
ALT SERPL W P-5'-P-CCNC: 25 U/L (ref 0–70)
ANION GAP SERPL CALCULATED.3IONS-SCNC: 12 MMOL/L (ref 7–15)
AST SERPL W P-5'-P-CCNC: 18 U/L (ref 0–45)
BILIRUB SERPL-MCNC: 0.3 MG/DL
BUN SERPL-MCNC: 13.5 MG/DL (ref 8–23)
CALCIUM SERPL-MCNC: 9.3 MG/DL (ref 8.8–10.4)
CHLORIDE SERPL-SCNC: 103 MMOL/L (ref 98–107)
CREAT SERPL-MCNC: 0.87 MG/DL (ref 0.67–1.17)
EGFRCR SERPLBLD CKD-EPI 2021: >90 ML/MIN/1.73M2
GLUCOSE SERPL-MCNC: 121 MG/DL (ref 70–99)
HCO3 SERPL-SCNC: 24 MMOL/L (ref 22–29)
MAGNESIUM SERPL-MCNC: 1.8 MG/DL (ref 1.7–2.3)
POTASSIUM SERPL-SCNC: 3.9 MMOL/L (ref 3.4–5.3)
PROT SERPL-MCNC: 7.2 G/DL (ref 6.4–8.3)
SODIUM SERPL-SCNC: 139 MMOL/L (ref 135–145)

## 2024-08-05 PROCEDURE — 250N000011 HC RX IP 250 OP 636: Performed by: INTERNAL MEDICINE

## 2024-08-05 PROCEDURE — 83735 ASSAY OF MAGNESIUM: CPT

## 2024-08-05 PROCEDURE — 99214 OFFICE O/P EST MOD 30 MIN: CPT

## 2024-08-05 PROCEDURE — G2211 COMPLEX E/M VISIT ADD ON: HCPCS

## 2024-08-05 PROCEDURE — 36592 COLLECT BLOOD FROM PICC: CPT

## 2024-08-05 PROCEDURE — G0463 HOSPITAL OUTPT CLINIC VISIT: HCPCS

## 2024-08-05 PROCEDURE — 80053 COMPREHEN METABOLIC PANEL: CPT

## 2024-08-05 RX ORDER — SULFAMETHOXAZOLE/TRIMETHOPRIM 800-160 MG
1 TABLET ORAL
Qty: 30 TABLET | Refills: 5 | Status: SHIPPED | OUTPATIENT
Start: 2024-08-12

## 2024-08-05 RX ORDER — HEPARIN SODIUM,PORCINE 10 UNIT/ML
5 VIAL (ML) INTRAVENOUS ONCE
Status: COMPLETED | OUTPATIENT
Start: 2024-08-05 | End: 2024-08-05

## 2024-08-05 RX ORDER — PANTOPRAZOLE SODIUM 40 MG/1
40 TABLET, DELAYED RELEASE ORAL DAILY
COMMUNITY
Start: 2024-08-05 | End: 2024-08-15

## 2024-08-05 RX ADMIN — Medication 5 ML: at 15:06

## 2024-08-05 ASSESSMENT — PAIN SCALES - GENERAL: PAINLEVEL: NO PAIN (0)

## 2024-08-05 NOTE — NURSING NOTE
Oncology Rooming Note    August 5, 2024 3:39 PM   Inocente Romero is a 64 year old male who presents for:    Chief Complaint   Patient presents with    Blood Draw     Labs collected from CVC by RN, line flushed with saline and heparin.  Vitals taken. Pt checked in for appointment(s).     Oncology Clinic Visit     RTN for S/P BMT for MM      Initial Vitals: /76   Pulse 105   Temp 98  F (36.7  C)   Resp 16   Wt 102.5 kg (226 lb)   SpO2 98%   BMI 30.65 kg/m   Estimated body mass index is 30.65 kg/m  as calculated from the following:    Height as of 7/25/24: 1.829 m (6').    Weight as of this encounter: 102.5 kg (226 lb). Body surface area is 2.28 meters squared.  No Pain (0) Comment: Data Unavailable   No LMP for male patient.  Allergies reviewed: Yes  Medications reviewed: Yes    Medications: Medication refills not needed today.  Pharmacy name entered into EPIC: [facility] - [place], [place] - [address]    Frailty Screening:   Is the patient here for a new oncology consult visit in cancer care? 2. No      Clinical concerns: none       Vidhya Edgar MA

## 2024-08-05 NOTE — PROGRESS NOTES
BMT/Cell Therapy Progress Note         Patient ID:  Inocente Romero is a 64 year old man with MM, Day +20 s/p HD auto HSCT.     Admitted 7/25-7/30 with F/N     INTERVAL  HISTORY   Here for follow-up. Feeling well. Appetite is improving. No n/v. Reports a normal bm in am, then 1 loose mid-day (?PO Mg), unchanged. No pain, rash, fevers.      Review of Systems: ROS negative except as noted above.        PHYSICAL EXAM   Blood pressure 137/76, pulse 105, temperature 98  F (36.7  C), resp. rate 16, weight 102.5 kg (226 lb), SpO2 98%.    Wt Readings from Last 4 Encounters:   08/05/24 102.5 kg (226 lb)   07/31/24 105.6 kg (232 lb 14.4 oz)   07/30/24 104.6 kg (230 lb 9.6 oz)   07/25/24 106.5 kg (234 lb 12.8 oz)     General: NAD   Eyes: sclera anicteric    Lungs: CTAB  Cardiovascular: RRR, no M/R/G  Lymphatics: no edema  Skin:  petechiae on ankles  Neuro: A&O   Access: R CVC NT     LABS: I have assessed all abnormal lab values for their clinical significance and any values considered clinically significant have been addressed in the assessment and plan.       Lab Results   Component Value Date    WBC 5.4 08/05/2024    ANEU 11.5 (H) 07/31/2024    HGB 10.7 (L) 08/05/2024    HCT 31.7 (L) 08/05/2024     (L) 08/05/2024     08/05/2024    POTASSIUM 3.9 08/05/2024    CHLORIDE 103 08/05/2024    CO2 24 08/05/2024     (H) 08/05/2024    BUN 13.5 08/05/2024    CR 0.87 08/05/2024    MAG 1.8 08/05/2024    INR 1.10 07/30/2024    BILITOTAL 0.3 08/05/2024    AST 18 08/05/2024    ALT 25 08/05/2024    ALKPHOS 63 08/05/2024    PROTTOTAL 7.2 08/05/2024    ALBUMIN 3.8 08/05/2024          ASSESSMENT AND PLAN   Inocente Romero is a 64 year old man with MM, Day +20 s/p HD auto HSCT.      BMT/IEC PROTOCOL for standard risk MM Auto PBSCT   Pt collected 9.84x10^06 CD34/kg   7/16 transplant; infused dose 3.77 million CD34/kg.   Restage per protocol    HEME/COAG  - Transfusion parameters: hgb <7g/dL and plts <10,000. Plts near  normal  - mild anemia 2/2 chemo/BMT  - 7/29 last dose GCSF; WBC wnl  - s/p pred 40mg daily 7/29-8/1 for engraftment syndrome.     ID  - neutropenic fevers: work up negative.    PPx: Fluc, ACV. Bactrim or alternative to start D28 post BMT.    GI  CINV ppx: prn antiemetics.  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 Rec Imodium or metamucil prn.    CV:   - Continue Norvasc, metoprolol, lisinopril.     FEN/Renal:  - continues on KCL 10meq BID ([facility]).   - cont PO Mg    Summary:  Ok to stop Protonix, fluc  Rx Bactrim to start 8/12  IR consult for line removal 8/7 or next available  Dressing change today  RTC 8/7 (D21), then 8/13 for D28 labs and MD follow-up 8/15      I spent 30 minutes in the care of this patient today, which included time necessary for preparation for the visit, obtaining history, ordering medications/tests/procedures as medically indicated, review of pertinent medical literature, counseling of the patient, communication of recommendations to the care team, and documentation time.    The longitudinal plan of care for the diagnosis(es)/condition(s) as documented were addressed during this visit. Due to the added complexity in care, I will continue to support Felix in the subsequent management and with ongoing continuity of care.    Holly Andres PA-C

## 2024-08-05 NOTE — NURSING NOTE
Chief Complaint   Patient presents with    Blood Draw     Labs collected from CVC by RN, line flushed with saline and heparin.  Vitals taken. Pt checked in for appointment(s).      Labs collected from CVC by RN, line flushed with saline and heparin.  Vitals taken. Pt checked in for appointment(s).     Bethany Escobar RN

## 2024-08-07 ENCOUNTER — LAB (OUTPATIENT)
Dept: LAB | Facility: CLINIC | Age: 64
End: 2024-08-07
Attending: NURSE PRACTITIONER
Payer: COMMERCIAL

## 2024-08-07 ENCOUNTER — ONCOLOGY VISIT (OUTPATIENT)
Dept: TRANSPLANT | Facility: CLINIC | Age: 64
End: 2024-08-07
Attending: NURSE PRACTITIONER
Payer: COMMERCIAL

## 2024-08-07 VITALS
OXYGEN SATURATION: 97 % | DIASTOLIC BLOOD PRESSURE: 79 MMHG | SYSTOLIC BLOOD PRESSURE: 138 MMHG | HEART RATE: 82 BPM | WEIGHT: 228.6 LBS | BODY MASS INDEX: 31 KG/M2 | TEMPERATURE: 98.2 F | RESPIRATION RATE: 16 BRPM

## 2024-08-07 DIAGNOSIS — C90.00 MULTIPLE MYELOMA, REMISSION STATUS UNSPECIFIED (H): Primary | ICD-10-CM

## 2024-08-07 LAB
ANION GAP SERPL CALCULATED.3IONS-SCNC: 9 MMOL/L (ref 7–15)
BASOPHILS # BLD AUTO: 0 10E3/UL (ref 0–0.2)
BASOPHILS NFR BLD AUTO: 0 %
BUN SERPL-MCNC: 14.2 MG/DL (ref 8–23)
CALCIUM SERPL-MCNC: 9 MG/DL (ref 8.8–10.4)
CHLORIDE SERPL-SCNC: 104 MMOL/L (ref 98–107)
CREAT SERPL-MCNC: 0.81 MG/DL (ref 0.67–1.17)
EGFRCR SERPLBLD CKD-EPI 2021: >90 ML/MIN/1.73M2
EOSINOPHIL # BLD AUTO: 0 10E3/UL (ref 0–0.7)
EOSINOPHIL NFR BLD AUTO: 0 %
ERYTHROCYTE [DISTWIDTH] IN BLOOD BY AUTOMATED COUNT: 14.6 % (ref 10–15)
GLUCOSE SERPL-MCNC: 139 MG/DL (ref 70–99)
HCO3 SERPL-SCNC: 25 MMOL/L (ref 22–29)
HCT VFR BLD AUTO: 32.3 % (ref 40–53)
HGB BLD-MCNC: 10.5 G/DL (ref 13.3–17.7)
IMM GRANULOCYTES # BLD: 0.1 10E3/UL
IMM GRANULOCYTES NFR BLD: 2 %
INR PPP: 0.98 (ref 0.85–1.15)
LYMPHOCYTES # BLD AUTO: 1.1 10E3/UL (ref 0.8–5.3)
LYMPHOCYTES NFR BLD AUTO: 24 %
MCH RBC QN AUTO: 30.7 PG (ref 26.5–33)
MCHC RBC AUTO-ENTMCNC: 32.5 G/DL (ref 31.5–36.5)
MCV RBC AUTO: 94 FL (ref 78–100)
MONOCYTES # BLD AUTO: 0.8 10E3/UL (ref 0–1.3)
MONOCYTES NFR BLD AUTO: 18 %
NEUTROPHILS # BLD AUTO: 2.5 10E3/UL (ref 1.6–8.3)
NEUTROPHILS NFR BLD AUTO: 56 %
NRBC # BLD AUTO: 0 10E3/UL
NRBC BLD AUTO-RTO: 0 /100
PLATELET # BLD AUTO: 164 10E3/UL (ref 150–450)
POTASSIUM SERPL-SCNC: 4 MMOL/L (ref 3.4–5.3)
RBC # BLD AUTO: 3.42 10E6/UL (ref 4.4–5.9)
SODIUM SERPL-SCNC: 138 MMOL/L (ref 135–145)
URATE SERPL-MCNC: 5.3 MG/DL (ref 3.4–7)
WBC # BLD AUTO: 4.5 10E3/UL (ref 4–11)

## 2024-08-07 PROCEDURE — 250N000011 HC RX IP 250 OP 636: Performed by: NURSE PRACTITIONER

## 2024-08-07 PROCEDURE — 85610 PROTHROMBIN TIME: CPT | Performed by: INTERNAL MEDICINE

## 2024-08-07 PROCEDURE — 84295 ASSAY OF SERUM SODIUM: CPT

## 2024-08-07 PROCEDURE — 84550 ASSAY OF BLOOD/URIC ACID: CPT | Performed by: INTERNAL MEDICINE

## 2024-08-07 PROCEDURE — G0463 HOSPITAL OUTPT CLINIC VISIT: HCPCS

## 2024-08-07 PROCEDURE — 99213 OFFICE O/P EST LOW 20 MIN: CPT

## 2024-08-07 PROCEDURE — 36592 COLLECT BLOOD FROM PICC: CPT

## 2024-08-07 PROCEDURE — 85004 AUTOMATED DIFF WBC COUNT: CPT

## 2024-08-07 PROCEDURE — 99214 OFFICE O/P EST MOD 30 MIN: CPT

## 2024-08-07 PROCEDURE — G2211 COMPLEX E/M VISIT ADD ON: HCPCS

## 2024-08-07 RX ORDER — HEPARIN SODIUM,PORCINE 10 UNIT/ML
5 VIAL (ML) INTRAVENOUS ONCE
Status: COMPLETED | OUTPATIENT
Start: 2024-08-07 | End: 2024-08-07

## 2024-08-07 RX ADMIN — Medication 5 ML: at 06:43

## 2024-08-07 ASSESSMENT — PAIN SCALES - GENERAL: PAINLEVEL: NO PAIN (0)

## 2024-08-07 NOTE — LETTER
8/7/2024      Inocente Romero  [address]  [place] [address]      Dear Colleague,    Thank you for referring your patient, Inocente Romero, to the [facility]LO[facility] AND [facility]PLANT [facility] [place]. Please see a copy of my visit note below.    BMT/Cell Therapy Progress Note         Patient ID:  Inocente Romero is a 64 year old man with MM, Day +22 s/p HD auto HSCT.     Admitted 7/25-7/30 with F/N     INTERVAL  HISTORY   Here for follow-up. Feeling well. Still not to full strength but otherwise feeling well. No new complaints.     Review of Systems: ROS negative except as noted above.        PHYSICAL EXAM   Blood pressure 138/79, pulse 82, temperature 98.2  F (36.8  C), temperature source Oral, resp. rate 16, weight 103.7 kg (228 lb 9.6 oz), SpO2 97%.    Wt Readings from Last 4 Encounters:   08/07/24 103.7 kg (228 lb 9.6 oz)   08/05/24 102.5 kg (226 lb)   07/31/24 105.6 kg (232 lb 14.4 oz)   07/30/24 104.6 kg (230 lb 9.6 oz)     General: NAD   Eyes: sclera anicteric    Lungs: CTAB  Cardiovascular: RRR, no M/R/G  Lymphatics: no edema  Skin:  petechiae on ankles  Neuro: A&O   Access: R CVC NT     LABS: I have assessed all abnormal lab values for their clinical significance and any values considered clinically significant have been addressed in the assessment and plan.       Lab Results   Component Value Date    WBC 4.5 08/07/2024    ANEU 3.1 08/05/2024    HGB 10.5 (L) 08/07/2024    HCT 32.3 (L) 08/07/2024     08/07/2024     08/07/2024    POTASSIUM 4.0 08/07/2024    CHLORIDE 104 08/07/2024    CO2 25 08/07/2024     (H) 08/07/2024    BUN 14.2 08/07/2024    CR 0.81 08/07/2024    MAG 1.8 08/05/2024    INR 0.98 08/07/2024    BILITOTAL 0.3 08/05/2024    AST 18 08/05/2024    ALT 25 08/05/2024    ALKPHOS 63 08/05/2024    PROTTOTAL 7.2 08/05/2024    ALBUMIN 3.8 08/05/2024          ASSESSMENT AND PLAN   Inocente Romero is a 64 year old man with MM, Day +22 s/p HD auto  HSCT.      BMT/IEC PROTOCOL for standard risk MM Auto PBSCT   Pt collected 9.84x10^06 CD34/kg   7/16 transplant; infused dose 3.77 million CD34/kg.   Restage per protocol    HEME/COAG  - Transfusion parameters: hgb <7g/dL and plts <10,000. 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     ID  - neutropenic fevers: work up negative.    PPx: Fluc, ACV. Bactrim or alternative to start D28 post BMT-8/12  -Fluc now stopped. Bactrim to start next week    GI  CINV ppx: prn antiemetics.  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 Rec Imodium or metamucil prn.    CV:   - Continue Norvasc, metoprolol, lisinopril.     FEN/Renal:  - continues on KCL 10meq BID ([facility]).   - cont PO Mg    Summary:     IR consult for line removal-not yet scheduled     RTC 8/13 for D28 labs and MD follow-up 8/15      I spent 30 minutes in the care of this patient today, which included time necessary for preparation for the visit, obtaining history, ordering medications/tests/procedures as medically indicated, review of pertinent medical literature, counseling of the patient, communication of recommendations to the care team, and documentation time.    The longitudinal plan of care for the diagnosis(es)/condition(s) as documented were addressed during this visit. Due to the added complexity in care, I will continue to support Felix in the subsequent management and with ongoing continuity of care.    Jodi Herring NP        Again, thank you for allowing me to participate in the care of your patient.        Sincerely,        BMT Advanced Practice Provider

## 2024-08-07 NOTE — PROGRESS NOTES
BMT/Cell Therapy Progress Note         Patient ID:  Inocente Romero is a 64 year old man with MM, Day +22 s/p HD auto HSCT.     Admitted 7/25-7/30 with F/N     INTERVAL  HISTORY   Here for follow-up. Feeling well. Still not to full strength but otherwise feeling well. No new complaints.     Review of Systems: ROS negative except as noted above.        PHYSICAL EXAM   Blood pressure 138/79, pulse 82, temperature 98.2  F (36.8  C), temperature source Oral, resp. rate 16, weight 103.7 kg (228 lb 9.6 oz), SpO2 97%.    Wt Readings from Last 4 Encounters:   08/07/24 103.7 kg (228 lb 9.6 oz)   08/05/24 102.5 kg (226 lb)   07/31/24 105.6 kg (232 lb 14.4 oz)   07/30/24 104.6 kg (230 lb 9.6 oz)     General: NAD   Eyes: sclera anicteric    Lungs: CTAB  Cardiovascular: RRR, no M/R/G  Lymphatics: no edema  Skin:  petechiae on ankles  Neuro: A&O   Access: R CVC NT     LABS: I have assessed all abnormal lab values for their clinical significance and any values considered clinically significant have been addressed in the assessment and plan.       Lab Results   Component Value Date    WBC 4.5 08/07/2024    ANEU 3.1 08/05/2024    HGB 10.5 (L) 08/07/2024    HCT 32.3 (L) 08/07/2024     08/07/2024     08/07/2024    POTASSIUM 4.0 08/07/2024    CHLORIDE 104 08/07/2024    CO2 25 08/07/2024     (H) 08/07/2024    BUN 14.2 08/07/2024    CR 0.81 08/07/2024    MAG 1.8 08/05/2024    INR 0.98 08/07/2024    BILITOTAL 0.3 08/05/2024    AST 18 08/05/2024    ALT 25 08/05/2024    ALKPHOS 63 08/05/2024    PROTTOTAL 7.2 08/05/2024    ALBUMIN 3.8 08/05/2024          ASSESSMENT AND PLAN   Inocente Romero is a 64 year old man with MM, Day +22 s/p HD auto HSCT.      BMT/IEC PROTOCOL for standard risk MM Auto PBSCT   Pt collected 9.84x10^06 CD34/kg   7/16 transplant; infused dose 3.77 million CD34/kg.   Restage per protocol    HEME/COAG  - Transfusion parameters: hgb <7g/dL and plts <10,000. 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     ID  - neutropenic fevers: work up negative.    PPx: Fluc, ACV. Bactrim or alternative to start D28 post BMT-8/12  -Fluc now stopped. Bactrim to start next week    GI  CINV ppx: prn antiemetics.  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 Rec Imodium or metamucil prn.    CV:   - Continue Norvasc, metoprolol, lisinopril.     FEN/Renal:  - continues on KCL 10meq BID ([facility]   - cont PO Mg    Summary:     IR consult for line removal-not yet scheduled     RTC 8/13 for D28 labs and MD follow-up 8/15      I spent 30 minutes in the care of this patient today, which included time necessary for preparation for the visit, obtaining history, ordering medications/tests/procedures as medically indicated, review of pertinent medical literature, counseling of the patient, communication of recommendations to the care team, and documentation time.    The longitudinal plan of care for the diagnosis(es)/condition(s) as documented were addressed during this visit. Due to the added complexity in care, I will continue to support Felix in the subsequent management and with ongoing continuity of care.    Jodi Herring NP

## 2024-08-07 NOTE — NURSING NOTE
Oncology Rooming Note    August 7, 2024 7:12 AM   Inocente Romero is a 64 year old male who presents for:    Chief Complaint   Patient presents with    Blood Draw     Labs drawn by RN via CVC in lab, vitals taken.     Oncology Clinic Visit     Multiple Myeloma      Initial Vitals: /79 (BP Location: Right arm, Patient Position: Sitting, Cuff Size: Adult Regular)   Pulse 82   Temp 98.2  F (36.8  C) (Oral)   Resp 16   Wt 103.7 kg (228 lb 9.6 oz)   SpO2 97%   BMI 31.00 kg/m   Estimated body mass index is 31 kg/m  as calculated from the following:    Height as of 7/25/24: 1.829 m (6').    Weight as of this encounter: 103.7 kg (228 lb 9.6 oz). Body surface area is 2.3 meters squared.  No Pain (0) Comment: Data Unavailable   No LMP for male patient.  Allergies reviewed: Yes  Medications reviewed: Yes    Medications: Medication refills not needed today.  Pharmacy name entered into EPIC: [facility] - [place], [place] - [facility]    Frailty Screening:   Is the patient here for a new oncology consult visit in cancer care? 2. No      Clinical concerns: Order for line removed wondering for update, line was clotting this morning. Wondering if they should flush line twice until it gets removed    Zina Machado

## 2024-08-07 NOTE — NURSING NOTE
Chief Complaint   Patient presents with    Blood Draw     Labs drawn by RN via CVC in lab, vitals taken.       Labs drawn via CVC by RN. Flushed with saline and heparin. Vitals taken. Pt checked into next appt.      Trudy Mcintyre RN

## 2024-08-09 LAB
BACTERIA BLD CULT: NO GROWTH
BACTERIA BLD CULT: NO GROWTH

## 2024-08-13 ENCOUNTER — LAB (OUTPATIENT)
Dept: LAB | Facility: CLINIC | Age: 64
End: 2024-08-13
Attending: INTERNAL MEDICINE
Payer: COMMERCIAL

## 2024-08-13 ENCOUNTER — ANCILLARY PROCEDURE (OUTPATIENT)
Dept: INTERVENTIONAL RADIOLOGY/VASCULAR | Facility: CLINIC | Age: 64
End: 2024-08-13
Attending: PHYSICIAN ASSISTANT
Payer: COMMERCIAL

## 2024-08-13 DIAGNOSIS — C90.00 MULTIPLE MYELOMA, REMISSION STATUS UNSPECIFIED (H): ICD-10-CM

## 2024-08-13 PROCEDURE — 36589 REMOVAL TUNNELED CV CATH: CPT | Performed by: PHYSICIAN ASSISTANT

## 2024-08-13 NOTE — NURSING NOTE
Chief Complaint   Patient presents with    Labs Only     Labs drawn via CVC by RN in lab       Labs collected from CVC by RN, line flushed with saline.    Miryam Mcintyre RN

## 2024-08-13 NOTE — PROGRESS NOTES
Interventional Radiology Brief Post Procedure Note    Procedure: IR Tunneled Central Venous Line Removal-RIGHT     Proceduralist: Shelly Herrera PA-C     Time Out: Prior to the start of the procedure and with procedural staff participation, I verbally confirmed the patient s identity using two indicators, relevant allergies, that the procedure was appropriate and matched the consent or emergent situation, and that the correct equipment/implants were available. Immediately prior to starting the procedure I conducted the Time Out with the procedural staff and re-confirmed the patient s name, procedure, and site/side. (The Joint Commission universal protocol was followed.)  Yes    Sedation: None.     Findings: Removal of RIGHT tunneled catheter in its entirety.      Estimated Blood Loss: Minimal    SPECIMENS: None    Complications: 1. None     Condition: Stable    Plan: Discharge    Comments: See dictated procedure note for full details.    Shelly Herrera PA-C

## 2024-08-13 NOTE — DISCHARGE INSTRUCTIONS
A collaboration between [facility] and [facility]  Experts in minimally invasive, targeted treatments performed using imaging guidance    Tunneled Central Venous Catheter Removal    Today you had your existing tunneled central venous catheter removed because it was no longer needed for treatment.    One of our Radiology PAs performed this procedure for you today:  Date: 8/13/24  Provider: Shelly AWAD   Location: [facility]    After you go home:  Avoid lying flat or bending at the waist for 2 hours following removal of the catheter to reduce risk of bleeding from catheter site.  Keep any applied tape/gauze dressings clean and dry. Change tape/gauze dressings if they get wet or soiled.  You may shower following the procedure, however cover and protect from moisture any tape/gauze dressings.   You may remove tape/gauze dressings after 3 days if the site looks like it is in the process of healing or scabbing over.  Avoid strenuous activities (elevating heart rate) or lifting more than 10 pounds for the next 3 days. Walking, elliptical and golf are examples of acceptable activities.  If there is bleeding or oozing from the procedure site, apply firm pressure to the area above your collar bone (where the catheter entered the bloodstream) for 10 minutes.  If the bleeding continues, continue to hold pressure and seek medical attention at the phone numbers below.  Mild procedure site discomfort can be treated with an ice pack and over-the-counter pain relievers.           Call our Interventional Radiology (IR) service if:  If you start bleeding from the procedure site. Our radiology provider can help you decide if you need to return to the hospital.  If you have new or worsening pain related to the procedure.  If you have concerning swelling at the procedure site.  If you develop hives or a rash or any unexplained itching.  If you have a fever of greater  than 100.5  F and chills in the first 5 days after procedure.  Any other concerns related to your procedure.    Contact Number:  [phone]  IR RN Triage Line  Monday - Friday 8:00 am - 4:30 pm    After hours for urgent concerns:  [phone]  After 4:30 pm Monday - Friday, Weekends and Holidays.   Ask for Interventional Radiology on-call.  Someone is available 24 hours a day.  [facility] toll free number:  [phone]

## 2024-08-14 ENCOUNTER — DOCUMENTATION ONLY (OUTPATIENT)
Dept: TRANSPLANT | Facility: CLINIC | Age: 64
End: 2024-08-14

## 2024-08-14 ASSESSMENT — PULMONARY FUNCTION TESTS: FEV1/FVC_PERCENT_PREDICTED: 109

## 2024-08-14 ASSESSMENT — EJECTION FRACTION: LAST EJECTION FRACTION (EF) PRIOR TO CONDITIONING (%): NO

## 2024-08-15 ENCOUNTER — OFFICE VISIT (OUTPATIENT)
Dept: TRANSPLANT | Facility: CLINIC | Age: 64
End: 2024-08-15
Attending: STUDENT IN AN ORGANIZED HEALTH CARE EDUCATION/TRAINING PROGRAM
Payer: COMMERCIAL

## 2024-08-15 VITALS
WEIGHT: 229 LBS | TEMPERATURE: 98.3 F | BODY MASS INDEX: 31.06 KG/M2 | OXYGEN SATURATION: 98 % | SYSTOLIC BLOOD PRESSURE: 155 MMHG | DIASTOLIC BLOOD PRESSURE: 82 MMHG | HEART RATE: 75 BPM | RESPIRATION RATE: 16 BRPM

## 2024-08-15 DIAGNOSIS — Z94.81 STATUS POST BONE MARROW TRANSPLANT (H): ICD-10-CM

## 2024-08-15 DIAGNOSIS — C90.01 MULTIPLE MYELOMA IN REMISSION (H): Primary | ICD-10-CM

## 2024-08-15 PROCEDURE — 99213 OFFICE O/P EST LOW 20 MIN: CPT

## 2024-08-15 PROCEDURE — 99215 OFFICE O/P EST HI 40 MIN: CPT

## 2024-08-15 PROCEDURE — G2211 COMPLEX E/M VISIT ADD ON: HCPCS

## 2024-08-15 PROCEDURE — G0463 HOSPITAL OUTPT CLINIC VISIT: HCPCS

## 2024-08-15 ASSESSMENT — PAIN SCALES - GENERAL: PAINLEVEL: NO PAIN (0)

## 2024-08-15 NOTE — PROGRESS NOTES
BMT / Cell Therapy Progress Note      Inocente Romero is a 64 year old male referred by Dr. Sterling Jackman for multiple myeloma.      Disease presentation and baseline characteristics:   8/23: IgG kappa multiple myeloma, standard risk cytogenetics    Date Treatment Name Response Side Effects / Toxicities   10/27/23 VRd x 8 cycles     7/16/24 HDT/ASCT PA           HPI:  Please see my entry above for hematologic history.  Mr. Romero is seen today accompanied by his wife to review his D+28 restaging studies.  He reports feeling well, some intermittent mild nausea still but not requiring medication.  Energy and appetite are overall much better.      ASSESSMENT AND PLAN:  We reviewed D+28 peripheral restaging labs which are consistent with PA as above.  Full restaging will be done at D+100 with PET/CT and BMB.    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    Going forward the patient should re-establish care with their primary oncologist.  Although he is in remission Mr. Romero still has measurable disease with a monoclonal peak of 0.7 g/dL.  We discussed possible enrollment in the KarMMa-9 trial which randomizes patients to lenalidomide maintenance vs idel-jose followed by lenalidomide maintenance.  If he still has measurable disease at his D+100 restaging and is eligible this may be a good option for him.    If he is ineligible or not interested in clinical trials he can start maintenance anytime after D+60 assuming counts remain stable.    After D+60 Zometa can be resumed and should be continued for a total of 2 years.  The patient should continue a calcium/vitamin D supplement.    Continue acyclovir and bactrim for one year post-transplant.  Ok to stop fluconazole now.    COVID and influenza vaccinations can be given at D+100.  The remainder of vaccinations will be given at one and two years post-transplant per  protocol.    RTC with BMT at D+100.    I spent 40 minutes in the care of this patient today, which included time necessary for preparation for the visit, obtaining history, ordering medications/tests/procedures as medically indicated, review of pertinent medical literature, counseling of the patient, communication of recommendations to the care team, and documentation time.    Bala Ornelas MD    ROS:    10 point ROS neg other than the symptoms noted above in the HPI.      Current Outpatient Medications   Medication Sig Dispense Refill    acyclovir (ZOVIRAX) 800 MG tablet Take 1 tablet (800 mg) by mouth 2 times daily Start Day -1 60 tablet 11    amLODIPine (NORVASC) 10 MG tablet Take 1 tablet (10 mg) by mouth daily 30 tablet 11    lisinopril (ZESTRIL) 20 MG tablet Take 20 mg by mouth every morning      magnesium 200 MG TABS Take 200 mg by mouth daily      metoprolol succinate ER (TOPROL XL) 100 MG 24 hr tablet Take 100 mg by mouth every evening      potassium chloride ER (K-TAB/KLOR-CON) 10 MEQ CR tablet Take 10 mEq by mouth 2 times daily      sulfamethoxazole-trimethoprim (BACTRIM DS) 800-160 MG tablet Take 1 tablet by mouth Every Mon, Tues two times daily Starting 8/12/24, continue for 1 year post BMT. 30 tablet 5    Cyanocobalamin 1000 MCG/ML KIT Inject 1,000 mcg as directed every 30 days (Patient not taking: Reported on 7/31/2024)      OLANZapine (ZYPREXA) 2.5 MG tablet Take 2 tablets (5 mg) by mouth nightly as needed (sleep problems) (Patient not taking: Reported on 8/15/2024)      ondansetron (ZOFRAN) 8 MG tablet Take 1 tablet (8 mg) by mouth every 8 hours as needed (for nausea / vomiting) (Patient not taking: Reported on 8/15/2024) 30 tablet 0    prochlorperazine (COMPAZINE) 5 MG tablet Take 1-2 tablets (5-10 mg) by mouth every 6 hours as needed for nausea or vomiting (Patient not taking: Reported on 7/31/2024) 30 tablet 1         Physical Exam:     Vital Signs: BP (!) 155/82   Pulse 75   Temp 98.3  F  (36.8  C)   Resp 16   Wt 103.9 kg (229 lb)   SpO2 98%   BMI 31.06 kg/m      KPS:  80  General Appearance: alert, and no distress  Eyes: PERRL, conjunctiva and lids normal, sclera nonicteric  Cardio/Vascular: extremities WWP  Resp Effort And Auscultation: Breathing comfortably on room air  Edema: none  Skin: Skin color, texture, turgor normal. No rashes or lesions.  Neurologic: Gait normal.  Sensation grossly WNL.  Psych/Affect: Mood and affect are appropriate.    Blood Counts     Recent Labs   Lab Test 08/13/24  1425 08/07/24  0657 08/05/24  1505 07/23/24  0702 07/22/24  1225   HGB 11.8* 10.5* 10.7*   < > 11.8*   HCT 36.1* 32.3* 31.7*   < > 34.7*   WBC 7.2 4.5 5.4   < > 1.3*   ANEUTAUTO 4.3 2.5  --   --  1.2*   ALYMPAUTO 1.6 1.1  --   --  0.1*   AMONOAUTO 1.2 0.8  --   --  0.0   AEOSAUTO 0.0 0.0  --   --  0.0   ABSBASO 0.0 0.0  --   --  0.0   NRBCMAN 0.0 0.0 0.0   < > 0.0    164 146*   < > 50*    < > = values in this interval not displayed.         Chemistries     Basic Panel  Recent Labs   Lab Test 08/13/24  1425 08/07/24  0657 08/05/24  1505    138 139   POTASSIUM 3.8 4.0 3.9   CHLORIDE 104 104 103   CO2 24 25 24   BUN 15.2 14.2 13.5   CR 1.00 0.81 0.87   * 139* 121*        Calcium, Magnesium, Phosphorus  Recent Labs   Lab Test 08/13/24  1425 08/07/24  0657 08/05/24  1505 07/31/24  0718 07/30/24  0440 07/29/24  0344   REY 9.4 9.0 9.3 9.2 8.5* 8.0*   MAG 2.0  --  1.8 1.9 2.0 1.9   PHOS 2.7  --   --   --  2.5 2.8        LFTs  Recent Labs   Lab Test 08/13/24  1425 08/05/24  1505 07/30/24  0440   BILITOTAL 0.3 0.3 0.3   ALKPHOS 55 63 72   AST 36 18 19   ALT 38 25 20   ALBUMIN 4.2 3.8 3.5       LDH  Recent Labs   Lab Test 08/13/24  1425 05/13/24  1158    210       B2-Microglobulin  Recent Labs   Lab Test 06/18/24  0700 05/13/24  1158   QAXG2NVSF 1.7 1.7         Immunoglobulins     Recent Labs   Lab Test 08/13/24  1425 06/18/24  0700 05/13/24  1158   IGG 1,307 1,226 1,055       Recent Labs    Lab Test 08/13/24 1425 06/18/24  0700 05/13/24  1158    97 101       Recent Labs   Lab Test 08/13/24 1425 06/18/24  0700 05/13/24  1158    28* 21*         Monoclonal Protein Studies     M spike    Recent Labs   Lab Test 08/13/24 1425 06/18/24  0700 05/13/24  1158   ELPM 0.7* 0.8* 0.7*       [place] FLC    Recent Labs   Lab Test 08/13/24 1425 06/18/24  0700 05/13/24  1158   KFLCA 1.44 1.62 1.40       Lambda FLC    Recent Labs   Lab Test 08/13/24 1425 06/18/24  0700 05/13/24  1158   LFLCA 1.17 0.53* 0.50*       FLC Ratio    Recent Labs   Lab Test 08/13/24 1425 06/18/24  0700 05/13/24  1158   KLRA 1.23 3.06* 2.80*         Bone Marrow Biopsy       Morphology    Results for orders placed or performed in visit on 05/14/24 (from the past 8760 hour(s))   Bone marrow biopsy   Result Value    Final Diagnosis      Bone marrow, posterior iliac crest, left decalcified trephine biopsy and touch imprint; left direct aspirate smear, concentrate aspirate smear, and peripheral blood smear:    Residual plasma cell myeloma with the following characteristics:  - Normocellular marrow (30% estimated cellularity) with trilineage hematopoiesis, no dysplasia, no increase in blasts, and kappa-restricted plasma cells (4-5% by immunohistochemistry)  - Peripheral blood showing slight normochromic normocytic anemia  - See comment        Comment      Flow cytometry ([id number]) showed kappa-monotypic plasma cells. Concurrent ancillary studies are in progress and will be reported separately. Correlation with the results of ancillary tests and clinical findings is recommended.         Clinical Information      64 year old male with a history of IgG kappa multiple myeloma s/p VRd. This is a restaging bone marrow biopsy as part of work up for a bone marrow transplant.      Peripheral Hematologic Data      CBC WITH DIFFERENTIAL (05/14/2024 01:40 PM CDT):     RESULT VALUE REF. RANGE UNITS   WBC Count   Hemoglobin    Hematocrit    Platelet Count   RBC Count   MCV  MCH  MCHC  RDW 5.0 (NORMAL)     13.0  ( L )      38.2  ( L )  205 (NORMAL)   4.08  ( L )       94 (NORMAL)     31.9 (NORMAL)     34.0 (NORMAL)     14.6 (NORMAL) 4.0-11.0  13.3-17.7  40.0-53.0  150-450  4.40-5.90    26.5-33.0  31.5-36.5  10.0-15.0 10e3/uL  g/dL  %  10e3/uL  10e6/uL  fL  pg  g/dL  %   % Neutrophils  % Lymphocytes  % Monocytes  % Eosinophils  % Basophils  % Immature Granulocytes  Absolute Neutrophils  Absolute Lymphocytes  Absolute Monocytes  Absolute Eosinophils  Absolute Basophils  Absolute Immature Granulocytes  NRBCs per 100 WBC  Absolute NRBCs 66  22  11  1  0  0  3.3 (NORMAL)  1.1 (NORMAL)  0.6 (NORMAL)  0.0 (NORMAL)  0.0 (NORMAL)  0.0 (NORMAL)  0 (NORMAL)  0.0 () N/A  N/A  N/A  N/A  N/A  N/A  1.6-8.3  0.8-5.3  0.0-1.3  0.0-0.7  0.0-0.2  <=0.4  <1  <=0.0 %  %  %  %  %  %  10e3/uL  10e3/uL  10e3/uL  10e3/uL  10e3/uL  10e3/uL  /100  10e3/uL          Microscopic Description      PERIPHERAL BLOOD SMEAR MORPHOLOGY:  The red blood cells appear normochromic.  Poikilocytosis is minimal.  Polychromasia is not increased.  Rouleaux formation is not increased. The morphology of the platelets is normal. Lymphocytes are small and mature.  Neutrophils display normal cytoplasmic granularity and unremarkable nuclear morphology.  Circulating plasma cells are not seen.     Bone marrow aspirates and trephine core biopsy touch imprints are reviewed.    BONE MARROW DIFFERENTIAL (500 cells on bone marrow biopsy touch imprints )  Percent (%) Cell Population Reference Range (%)   1.4 Blasts  (0 - 1)   1.4 Neutrophil promyelocytes (2 - 4)   58.8 Neutrophils and precursors (54 - 63)   21.0 Erythroid precursors (18 - 24)   1.2 Monocytes (1 - 1.5)   2.8 Eosinophils (1 - 3)   0.4 Basophils (0 - 1)   9.2 Lymphocytes (8 - 12)   3.8 Plasma cells (0 - 1.5)     The aspirate smears are hemodilute, therefore the above differential is performed on the bone marrow trephine core biopsy touch  imprints.    Neutrophil maturation is complete without overt dysplasia. Erythroid maturation is complete without overt dysplasia. Megakaryocytes exhibit a spectrum of sizes and morphologies, within the limits of normal .    IRON STAINS:   Dacie iron stain on concentrate smears:   Iron stains show 22% sideroblasts.     Prussian blue stain on particle crush:    Marrow particles are rare to absent. Marrow iron stores cannot be assessed.    TREPHINE SECTIONS:  Hematoxylin and eosin stains are reviewed. The quality of the trephine core biopsy is good. The marrow cellularity is estimated at 30%. The cellular composition reflects the touch imprint differential. The number of megakaryocytes appears consistent with the degree of marrow cellularity. Erythroid islands and maturing myeloid precursors are noted. Marrow fibrosis is not identified on H&E.    IMMUNOHISTOCHEMISTRY:  Immunohistochemical stains are performed on the paraffin-embedded trephine core with appropriate controls.    Stains for  and kappa and lambda light chains highlight focally increased plasma cells (4-5% of cellularity); in areas where plasma cells are increased, they are kappa-restricted. Rare scattered polytypic plasma cells are also present.    Note: These immunohistochemical stains are deemed medically necessary. Some of the antigens may also be evaluated by flow cytometry. Concurrent evaluation by immunohistochemistry on clot and/or trephine sections is indicated in this case in order to correlate immunophenotype with cell morphology and determine extent of involvement, spatial pattern, and focality of potential disease distribution.      Case was reviewed by the following:  Pathology Fellow: Kimo Vallejo MD  A resident or fellow in a training program was involved in the initial review, preparation, and/or interpretation of this case.  I, as the senior physician, attest that I have personally reviewed all specimens and or slides, including  the listed special stains, and used them with my medical judgement to determine the final diagnosis.              Gross Description      Procedure/Gross Description   Aspirate(s) and trephine(s) procured by AURORA Herring NP    Specimen sent for Special Studies:         Flow Cytometry: left aspirate        Cytogenetics: left aspirate        Molecular Diagnostics: left aspirate         Biopsy aspiration site: left posterior iliac crest                                                      (Reference Range)          Amount of aspirate           3.3   mL        Fat and P.V. cell layer        trace    %               (1 - 3)        Particles                             0   %        Myeloid-erythroid layer    trace    %               (5 - 8)          Clot Section: no    Trephine biopsy site: left posterior iliac crest    Designated left posterior iliac crest is 1 cylinder of gritty tissue, labeled with the patient's name and hospital number, obtained with 11 gauge needle and a length of 20 mm; entirely submitted in 1 cassette; acetic zinc formalin fixed, decalcified, processed, and stained for hematoxylin and eosin per laboratory protocol.        MCRS Yes (A)    Performing Labs      The technical component of this testing was completed at [facility].     Stain controls for all stains resulted within this report have been reviewed and show appropriate reactivity.            PET Scan       Results for orders placed during the hospital encounter of 05/16/24    PET ONCOLOGY WHOLE BODY    Status: Normal 5/16/2024    Narrative  Combined Report of: PET and CT on  5/16/2024 11:22 AM:    1. PET of the neck, chest, abdomen, and pelvis.  2. PET CT Fusion for Attenuation Correction and Anatomical  Localization:  3. 3D MIP and PET-CT fused images were processed on an independent  workstation and archived to PACS and reviewed by a radiologist.    Technique:    1. PET:  The patient received 13.79 mCi of F-18-FDG; the serum glucose  was 100 mg/dL prior to administration, body weight was 104.5 kg.  Images were evaluated in the axial, sagittal, and coronal planes as  well as the rotational whole body MIP. Images were acquired from the  Vertex to the Feet.    UPTAKE WAS MEASURED AT 63 MINUTES.    2. CT: CT only obtained for attenuation correction and not diagnostic  purposes.    INDICATION: Vitamin D deficiency; Multiple myeloma, remission status  unspecified (H); Examination prior to chemotherapy; Personal history  of diseases of blood and blood-forming organs    COMPARISON: PET/CT dated 10/10/2023.    FINDINGS:  BACKGROUND: Liver SUV max = 3.5, Aorta Blood SUV max = 2.8.    HEAD/NECK:  No abnormal uptake.    Mild diffuse cerebral volume loss is likely age related.    CHEST:  No abnormal uptake.    Mild scattered coronary calcifications.    ABDOMEN AND PELVIS:  No abnormal uptake.    Mild multifocal patchy FDG uptake in the prostate gland.    Photopenic right renal cyst. Small splenule. Mild aneurysmal  dilatation of the celiac trunk. Small fat-containing umbilical and  right inguinal hernias.    BONES AND SOFT TISSUES:  No hypermetabolic myelomatous deposits identified. Decreased focal  uptake in the right humerus with SUV max of 2.3 (4/61), previously  4.0.    Similar non-FDG avid diffuse osteolytic changes throughout the axial  and proximal appendicular skeleton. Linearly increased uptake along  the left posterior iliac bone likely represents the prior biopsy  tract.    Multilevel degenerative changes in the spine.    Impression  IMPRESSION:    1. No hypermetabolic myelomatous deposits identified. Interval reduced  focal FDG uptake in the right proximal humerus, with uptake intensity  similar to blood pool favoring residual inflammation.    2. Similar non-FDG avid diffuse osteolytic changes throughout the  axial and proximal appendicular skeleton.    3. Mild patchy heterogenous FDG  uptake in the prostate gland is  nonspecific, correlate with serum prostate-specific antigen levels.    AMAN ORELLANA MD      SYSTEM ID:  [id number]     The longitudinal plan of care for the diagnosis(es)/condition(s) as documented were addressed during this visit. Due to the added complexity in care, I will continue to support Felix in the subsequent management and with ongoing continuity of care.    ------------------------------------------------------------------------------------------------------------------------------------------------    Patient Care Team         Relationship Specialty Notifications Start End    Wing Bolden PCP - General Family Medicine  7/6/24     Phone: [phone] Fax: [phone]         [address] [address]    Gianfranco Carlin MD BMT Physician Transplant Abnormal results only, Admissions 5/13/24     Phone: [phone] Fax: [phone] [address] [facility] [address]    Ileana Cotton, RN BMT Nurse Coordinator  Admissions 5/13/24     Gianfranco Carlin MD Assigned Cancer Care Provider   6/23/24     Phone: [phone] Fax: [phone] [address] [facility] [address]    Ely Núñez, [facility]    7/26/24     Phone: [phone] Pager: [phone]

## 2024-08-15 NOTE — NURSING NOTE
Oncology Rooming Note    August 15, 2024 1:09 PM   Inocente oRmero is a 64 year old male who presents for:    Chief Complaint   Patient presents with    Oncology Clinic Visit     Post bmt for MM here for labs and md visit     Initial Vitals: BP (!) 155/82   Pulse 75   Temp 98.3  F (36.8  C)   Resp 16   Wt 103.9 kg (229 lb)   SpO2 98%   BMI 31.06 kg/m   Estimated body mass index is 31.06 kg/m  as calculated from the following:    Height as of 7/25/24: 1.829 m (6').    Weight as of this encounter: 103.9 kg (229 lb). Body surface area is 2.3 meters squared.  No Pain (0) Comment: Data Unavailable   No LMP for male patient.  Allergies reviewed: Yes  Medications reviewed: Yes    Medications: Medication refills not needed today.  Pharmacy name entered into EPIC: [facility] - [place], [place] - [address]    Frailty Screening:   Is the patient here for a new oncology consult visit in cancer care? 2. No      Clinical concerns: none       Abbie Smyth RN

## 2024-08-15 NOTE — LETTER
8/15/2024      Inocente Romero  [address]  [place] [address]      Dear Colleague,    Thank you for referring your patient, Inocente Romero, to the [facility] BLOOD AND MARROW TRANSPLANT PRO[facility] [place]. Please see a copy of my visit note below.         BMT / Cell Therapy Progress Note      Inocente Romero is a 64 year old male referred by Dr. Sterling Jackman for multiple myeloma.      Disease presentation and baseline characteristics:   8/23: IgG kappa multiple myeloma, standard risk cytogenetics    Date Treatment Name Response Side Effects / Toxicities   10/27/23 VRd x 8 cycles     7/16/24 HDT/ASCT KS           HPI:  Please see my entry above for hematologic history.  Mr. Romero is seen today accompanied by his wife to review his D+28 restaging studies.  He reports feeling well, some intermittent mild nausea still but not requiring medication.  Energy and appetite are overall much better.      ASSESSMENT AND PLAN:  We reviewed D+28 peripheral restaging labs which are consistent with KS as above.  Full restaging will be done at D+100 with PET/CT and BMB.    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    Going forward the patient should re-establish care with their primary oncologist.  Although he is in remission Mr. Romero still has measurable disease with a monoclonal peak of 0.7 g/dL.  We discussed possible enrollment in the KarMMa-9 trial which randomizes patients to lenalidomide maintenance vs idel-jose followed by lenalidomide maintenance.  If he still has measurable disease at his D+100 restaging and is eligible this may be a good option for him.    If he is ineligible or not interested in clinical trials he can start maintenance anytime after D+60 assuming counts remain stable.    After D+60 Zometa can be resumed and should be continued for a total of 2 years.  The patient should continue a  calcium/vitamin D supplement.    Continue acyclovir and bactrim for one year post-transplant.  Ok to stop fluconazole now.    COVID and influenza vaccinations can be given at D+100.  The remainder of vaccinations will be given at one and two years post-transplant per protocol.    RTC with BMT at D+100.    I spent 40 minutes in the care of this patient today, which included time necessary for preparation for the visit, obtaining history, ordering medications/tests/procedures as medically indicated, review of pertinent medical literature, counseling of the patient, communication of recommendations to the care team, and documentation time.    Bala Ornelas MD    ROS:    10 point ROS neg other than the symptoms noted above in the HPI.      Current Outpatient Medications   Medication Sig Dispense Refill     acyclovir (ZOVIRAX) 800 MG tablet Take 1 tablet (800 mg) by mouth 2 times daily Start Day -1 60 tablet 11     amLODIPine (NORVASC) 10 MG tablet Take 1 tablet (10 mg) by mouth daily 30 tablet 11     lisinopril (ZESTRIL) 20 MG tablet Take 20 mg by mouth every morning       magnesium 200 MG TABS Take 200 mg by mouth daily       metoprolol succinate ER (TOPROL XL) 100 MG 24 hr tablet Take 100 mg by mouth every evening       potassium chloride ER (K-TAB/KLOR-CON) 10 MEQ CR tablet Take 10 mEq by mouth 2 times daily       sulfamethoxazole-trimethoprim (BACTRIM DS) 800-160 MG tablet Take 1 tablet by mouth Every Mon, Tues two times daily Starting 8/12/24, continue for 1 year post BMT. 30 tablet 5     Cyanocobalamin 1000 MCG/ML KIT Inject 1,000 mcg as directed every 30 days (Patient not taking: Reported on 7/31/2024)       OLANZapine (ZYPREXA) 2.5 MG tablet Take 2 tablets (5 mg) by mouth nightly as needed (sleep problems) (Patient not taking: Reported on 8/15/2024)       ondansetron (ZOFRAN) 8 MG tablet Take 1 tablet (8 mg) by mouth every 8 hours as needed (for nausea / vomiting) (Patient not taking: Reported on 8/15/2024) 30  tablet 0     prochlorperazine (COMPAZINE) 5 MG tablet Take 1-2 tablets (5-10 mg) by mouth every 6 hours as needed for nausea or vomiting (Patient not taking: Reported on 7/31/2024) 30 tablet 1         Physical Exam:     Vital Signs: BP (!) 155/82   Pulse 75   Temp 98.3  F (36.8  C)   Resp 16   Wt 103.9 kg (229 lb)   SpO2 98%   BMI 31.06 kg/m      KPS:  80  General Appearance: alert, and no distress  Eyes: PERRL, conjunctiva and lids normal, sclera nonicteric  Cardio/Vascular: extremities WWP  Resp Effort And Auscultation: Breathing comfortably on room air  Edema: none  Skin: Skin color, texture, turgor normal. No rashes or lesions.  Neurologic: Gait normal.  Sensation grossly WNL.  Psych/Affect: Mood and affect are appropriate.    Blood Counts     Recent Labs   Lab Test 08/13/24 1425 08/07/24  0657 08/05/24  1505 07/23/24  0702 07/22/24  1225   HGB 11.8* 10.5* 10.7*   < > 11.8*   HCT 36.1* 32.3* 31.7*   < > 34.7*   WBC 7.2 4.5 5.4   < > 1.3*   ANEUTAUTO 4.3 2.5  --   --  1.2*   ALYMPAUTO 1.6 1.1  --   --  0.1*   AMONOAUTO 1.2 0.8  --   --  0.0   AEOSAUTO 0.0 0.0  --   --  0.0   ABSBASO 0.0 0.0  --   --  0.0   NRBCMAN 0.0 0.0 0.0   < > 0.0    164 146*   < > 50*    < > = values in this interval not displayed.         Chemistries     Basic Panel  Recent Labs   Lab Test 08/13/24 1425 08/07/24  0657 08/05/24  1505    138 139   POTASSIUM 3.8 4.0 3.9   CHLORIDE 104 104 103   CO2 24 25 24   BUN 15.2 14.2 13.5   CR 1.00 0.81 0.87   * 139* 121*        Calcium, Magnesium, Phosphorus  Recent Labs   Lab Test 08/13/24 1425 08/07/24  0657 08/05/24  1505 07/31/24  0718 07/30/24  0440 07/29/24  0344   REY 9.4 9.0 9.3 9.2 8.5* 8.0*   MAG 2.0  --  1.8 1.9 2.0 1.9   PHOS 2.7  --   --   --  2.5 2.8        LFTs  Recent Labs   Lab Test 08/13/24  1425 08/05/24  1505 07/30/24  0440   BILITOTAL 0.3 0.3 0.3   ALKPHOS 55 63 72   AST 36 18 19   ALT 38 25 20   ALBUMIN 4.2 3.8 3.5       LDH  Recent Labs   Lab Test  08/13/24 1425 05/13/24  1158    210       B2-Microglobulin  Recent Labs   Lab Test 06/18/24  0700 05/13/24  1158   KEOT0RCKD 1.7 1.7         Immunoglobulins     Recent Labs   Lab Test 08/13/24 1425 06/18/24  0700 05/13/24  1158   IGG 1,307 1,226 1,055       Recent Labs   Lab Test 08/13/24 1425 06/18/24  0700 05/13/24  1158    97 101       Recent Labs   Lab Test 08/13/24 1425 06/18/24  0700 05/13/24  1158    28* 21*         Monoclonal Protein Studies     M spike    Recent Labs   Lab Test 08/13/24 1425 06/18/24  0700 05/13/24  1158   ELPM 0.7* 0.8* 0.7*       [place] FLC    Recent Labs   Lab Test 08/13/24 1425 06/18/24  0700 05/13/24  1158   KFLCA 1.44 1.62 1.40       Lambda FLC    Recent Labs   Lab Test 08/13/24 1425 06/18/24  0700 05/13/24  1158   LFLCA 1.17 0.53* 0.50*       FLC Ratio    Recent Labs   Lab Test 08/13/24 1425 06/18/24 0700 05/13/24  1158   KLRA 1.23 3.06* 2.80*         Bone Marrow Biopsy       Morphology    Results for orders placed or performed in visit on 05/14/24 (from the past 8760 hour(s))   Bone marrow biopsy   Result Value    Final Diagnosis      Bone marrow, posterior iliac crest, left decalcified trephine biopsy and touch imprint; left direct aspirate smear, concentrate aspirate smear, and peripheral blood smear:    Residual plasma cell myeloma with the following characteristics:  - Normocellular marrow (30% estimated cellularity) with trilineage hematopoiesis, no dysplasia, no increase in blasts, and kappa-restricted plasma cells (4-5% by immunohistochemistry)  - Peripheral blood showing slight normochromic normocytic anemia  - See comment        Comment      Flow cytometry ([id number]) showed kappa-monotypic plasma cells. Concurrent ancillary studies are in progress and will be reported separately. Correlation with the results of ancillary tests and clinical findings is recommended.         Clinical Information      64 year old male with a history of IgG kappa  multiple myeloma s/p VRd. This is a restaging bone marrow biopsy as part of work up for a bone marrow transplant.      Peripheral Hematologic Data      CBC WITH DIFFERENTIAL (05/14/2024 01:40 PM CDT):     RESULT VALUE REF. RANGE UNITS   WBC Count   Hemoglobin    Hematocrit   Platelet Count   RBC Count   MCV  MCH  MCHC  RDW 5.0 (NORMAL)     13.0  ( L )      38.2  ( L )  205 (NORMAL)   4.08  ( L )       94 (NORMAL)     31.9 (NORMAL)     34.0 (NORMAL)     14.6 (NORMAL) 4.0-11.0  13.3-17.7  40.0-53.0  150-450  4.40-5.90    26.5-33.0  31.5-36.5  10.0-15.0 10e3/uL  g/dL  %  10e3/uL  10e6/uL  fL  pg  g/dL  %   % Neutrophils  % Lymphocytes  % Monocytes  % Eosinophils  % Basophils  % Immature Granulocytes  Absolute Neutrophils  Absolute Lymphocytes  Absolute Monocytes  Absolute Eosinophils  Absolute Basophils  Absolute Immature Granulocytes  NRBCs per 100 WBC  Absolute NRBCs 66  22  11  1  0  0  3.3 (NORMAL)  1.1 (NORMAL)  0.6 (NORMAL)  0.0 (NORMAL)  0.0 (NORMAL)  0.0 (NORMAL)  0 (NORMAL)  0.0 () N/A  N/A  N/A  N/A  N/A  N/A  1.6-8.3  0.8-5.3  0.0-1.3  0.0-0.7  0.0-0.2  <=0.4  <1  <=0.0 %  %  %  %  %  %  10e3/uL  10e3/uL  10e3/uL  10e3/uL  10e3/uL  10e3/uL  /100  10e3/uL          Microscopic Description      PERIPHERAL BLOOD SMEAR MORPHOLOGY:  The red blood cells appear normochromic.  Poikilocytosis is minimal.  Polychromasia is not increased.  Rouleaux formation is not increased. The morphology of the platelets is normal. Lymphocytes are small and mature.  Neutrophils display normal cytoplasmic granularity and unremarkable nuclear morphology.  Circulating plasma cells are not seen.     Bone marrow aspirates and trephine core biopsy touch imprints are reviewed.    BONE MARROW DIFFERENTIAL (500 cells on bone marrow biopsy touch imprints )  Percent (%) Cell Population Reference Range (%)   1.4 Blasts  (0 - 1)   1.4 Neutrophil promyelocytes (2 - 4)   58.8 Neutrophils and precursors (54 - 63)   21.0 Erythroid precursors  (18 - 24)   1.2 Monocytes (1 - 1.5)   2.8 Eosinophils (1 - 3)   0.4 Basophils (0 - 1)   9.2 Lymphocytes (8 - 12)   3.8 Plasma cells (0 - 1.5)     The aspirate smears are hemodilute, therefore the above differential is performed on the bone marrow trephine core biopsy touch imprints.    Neutrophil maturation is complete without overt dysplasia. Erythroid maturation is complete without overt dysplasia. Megakaryocytes exhibit a spectrum of sizes and morphologies, within the limits of normal .    IRON STAINS:   Dacie iron stain on concentrate smears:   Iron stains show 22% sideroblasts.     Prussian blue stain on particle crush:    Marrow particles are rare to absent. Marrow iron stores cannot be assessed.    TREPHINE SECTIONS:  Hematoxylin and eosin stains are reviewed. The quality of the trephine core biopsy is good. The marrow cellularity is estimated at 30%. The cellular composition reflects the touch imprint differential. The number of megakaryocytes appears consistent with the degree of marrow cellularity. Erythroid islands and maturing myeloid precursors are noted. Marrow fibrosis is not identified on H&E.    IMMUNOHISTOCHEMISTRY:  Immunohistochemical stains are performed on the paraffin-embedded trephine core with appropriate controls.    Stains for  and kappa and lambda light chains highlight focally increased plasma cells (4-5% of cellularity); in areas where plasma cells are increased, they are kappa-restricted. Rare scattered polytypic plasma cells are also present.    Note: These immunohistochemical stains are deemed medically necessary. Some of the antigens may also be evaluated by flow cytometry. Concurrent evaluation by immunohistochemistry on clot and/or trephine sections is indicated in this case in order to correlate immunophenotype with cell morphology and determine extent of involvement, spatial pattern, and focality of potential disease distribution.      Case was reviewed by the  following:  Pathology Fellow: Kimo Vallejo MD  A resident or fellow in a training program was involved in the initial review, preparation, and/or interpretation of this case.  I, as the senior physician, attest that I have personally reviewed all specimens and or slides, including the listed special stains, and used them with my medical judgement to determine the final diagnosis.              Gross Description      Procedure/Gross Description   Aspirate(s) and trephine(s) procured by AURORA Herring NP    Specimen sent for Special Studies:         Flow Cytometry: left aspirate        Cytogenetics: left aspirate        Molecular Diagnostics: left aspirate         Biopsy aspiration site: left posterior iliac crest                                                      (Reference Range)          Amount of aspirate           3.3   mL        Fat and P.V. cell layer        trace    %               (1 - 3)        Particles                             0   %        Myeloid-erythroid layer    trace    %               (5 - 8)          Clot Section: no    Trephine biopsy site: left posterior iliac crest    Designated left posterior iliac crest is 1 cylinder of gritty tissue, labeled with the patient's name and hospital number, obtained with 11 gauge needle and a length of 20 mm; entirely submitted in 1 cassette; acetic zinc formalin fixed, decalcified, processed, and stained for hematoxylin and eosin per laboratory protocol.        MCRS Yes (A)    Performing Labs      The technical component of this testing was completed at [facility].     Stain controls for all stains resulted within this report have been reviewed and show appropriate reactivity.            PET Scan       Results for orders placed during the hospital encounter of 05/16/24    PET ONCOLOGY WHOLE BODY    Status: Normal 5/16/2024    Narrative  Combined Report of: PET and CT on  5/16/2024 11:22  AM:    1. PET of the neck, chest, abdomen, and pelvis.  2. PET CT Fusion for Attenuation Correction and Anatomical  Localization:  3. 3D MIP and PET-CT fused images were processed on an independent  workstation and archived to PACS and reviewed by a radiologist.    Technique:    1. PET: The patient received 13.79 mCi of F-18-FDG; the serum glucose  was 100 mg/dL prior to administration, body weight was 104.5 kg.  Images were evaluated in the axial, sagittal, and coronal planes as  well as the rotational whole body MIP. Images were acquired from the  Vertex to the Feet.    UPTAKE WAS MEASURED AT 63 MINUTES.    2. CT: CT only obtained for attenuation correction and not diagnostic  purposes.    INDICATION: Vitamin D deficiency; Multiple myeloma, remission status  unspecified (H); Examination prior to chemotherapy; Personal history  of diseases of blood and blood-forming organs    COMPARISON: PET/CT dated 10/10/2023.    FINDINGS:  BACKGROUND: Liver SUV max = 3.5, Aorta Blood SUV max = 2.8.    HEAD/NECK:  No abnormal uptake.    Mild diffuse cerebral volume loss is likely age related.    CHEST:  No abnormal uptake.    Mild scattered coronary calcifications.    ABDOMEN AND PELVIS:  No abnormal uptake.    Mild multifocal patchy FDG uptake in the prostate gland.    Photopenic right renal cyst. Small splenule. Mild aneurysmal  dilatation of the celiac trunk. Small fat-containing umbilical and  right inguinal hernias.    BONES AND SOFT TISSUES:  No hypermetabolic myelomatous deposits identified. Decreased focal  uptake in the right humerus with SUV max of 2.3 (4/61), previously  4.0.    Similar non-FDG avid diffuse osteolytic changes throughout the axial  and proximal appendicular skeleton. Linearly increased uptake along  the left posterior iliac bone likely represents the prior biopsy  tract.    Multilevel degenerative changes in the spine.    Impression  IMPRESSION:    1. No hypermetabolic myelomatous deposits identified.  Interval reduced  focal FDG uptake in the right proximal humerus, with uptake intensity  similar to blood pool favoring residual inflammation.    2. Similar non-FDG avid diffuse osteolytic changes throughout the  axial and proximal appendicular skeleton.    3. Mild patchy heterogenous FDG uptake in the prostate gland is  nonspecific, correlate with serum prostate-specific antigen levels.    AMAN ORELLANA MD      SYSTEM ID:  [id number]     The longitudinal plan of care for the diagnosis(es)/condition(s) as documented were addressed during this visit. Due to the added complexity in care, I will continue to support Felix in the subsequent management and with ongoing continuity of care.    ------------------------------------------------------------------------------------------------------------------------------------------------    Patient Care Team         Relationship Specialty Notifications Start End    Wing Bolden PCP - General Family Medicine  7/6/24     Phone: [phone] Fax: [phone]         [address] [address]    Gianfranco Cralin MD BMT Physician Transplant Abnormal results only, Admissions 5/13/24     Phone: [phone] Fax: [phone] [address] [facility] [address]    Ileana Cotton, RN BMT Nurse Coordinator  Admissions 5/13/24     Gianfranco Carlin MD Assigned Cancer Care Provider   6/23/24     Phone: [phone] Fax: [phone] [address] [facility] [address]    Ely Núñez [facility]    7/26/24     Phone: [phone] Pager: [phone]                Again, thank you for allowing me to participate in the care of your patient.        Sincerely,         BMT Auto Cell Therapy

## 2024-10-25 ENCOUNTER — HOSPITAL ENCOUNTER (OUTPATIENT)
Dept: PET IMAGING | Facility: CLINIC | Age: 64
Discharge: HOME | End: 2024-10-25
Attending: INTERNAL MEDICINE | Admitting: INTERNAL MEDICINE
Payer: COMMERCIAL

## 2024-10-25 ENCOUNTER — APPOINTMENT (OUTPATIENT)
Dept: LAB | Facility: CLINIC | Age: 64
End: 2024-10-25
Attending: INTERNAL MEDICINE
Payer: COMMERCIAL

## 2024-10-25 ENCOUNTER — OFFICE VISIT (OUTPATIENT)
Dept: TRANSPLANT | Facility: CLINIC | Age: 64
End: 2024-10-25
Attending: INTERNAL MEDICINE
Payer: COMMERCIAL

## 2024-10-25 VITALS
OXYGEN SATURATION: 98 % | BODY MASS INDEX: 31.99 KG/M2 | DIASTOLIC BLOOD PRESSURE: 81 MMHG | SYSTOLIC BLOOD PRESSURE: 143 MMHG | HEART RATE: 78 BPM | WEIGHT: 235.89 LBS | RESPIRATION RATE: 12 BRPM | TEMPERATURE: 97.9 F

## 2024-10-25 DIAGNOSIS — C90.00 MULTIPLE MYELOMA, REMISSION STATUS UNSPECIFIED (H): Primary | ICD-10-CM

## 2024-10-25 DIAGNOSIS — C90.00 MULTIPLE MYELOMA, REMISSION STATUS UNSPECIFIED (H): ICD-10-CM

## 2024-10-25 LAB
ALBUMIN MFR UR ELPH: 14.9 MG/DL
ALBUMIN SERPL BCG-MCNC: 4.7 G/DL (ref 3.5–5.2)
ALP SERPL-CCNC: 66 U/L (ref 40–150)
ALT SERPL W P-5'-P-CCNC: 13 U/L (ref 0–70)
ANION GAP SERPL CALCULATED.3IONS-SCNC: 10 MMOL/L (ref 7–15)
AST SERPL W P-5'-P-CCNC: 19 U/L (ref 0–45)
BASOPHILS # BLD AUTO: 0 10E3/UL (ref 0–0.2)
BASOPHILS NFR BLD AUTO: 0 %
BILIRUB SERPL-MCNC: 0.3 MG/DL
BUN SERPL-MCNC: 12.7 MG/DL (ref 8–23)
CALCIUM SERPL-MCNC: 9.7 MG/DL (ref 8.8–10.4)
CD19 CELLS # BLD: 223 CELLS/UL (ref 107–698)
CD19 CELLS NFR BLD: 15 % (ref 6–27)
CD3 CELLS # BLD: 606 CELLS/UL (ref 603–2990)
CD3 CELLS NFR BLD: 41 % (ref 49–84)
CD3+CD4+ CELLS # BLD: 399 CELLS/UL (ref 441–2156)
CD3+CD4+ CELLS NFR BLD: 27 % (ref 28–63)
CD3+CD4+ CELLS/CD3+CD8+ CLL BLD: 1.96 % (ref 1.4–2.6)
CD3+CD8+ CELLS # BLD: 204 CELLS/UL (ref 125–1312)
CD3+CD8+ CELLS NFR BLD: 14 % (ref 10–40)
CD3-CD16+CD56+ CELLS # BLD: 618 CELLS/UL (ref 95–640)
CD3-CD16+CD56+ CELLS NFR BLD: 42 % (ref 4–25)
CHLORIDE SERPL-SCNC: 106 MMOL/L (ref 98–107)
CREAT SERPL-MCNC: 0.97 MG/DL (ref 0.67–1.17)
CREAT UR-MCNC: 128 MG/DL
EGFRCR SERPLBLD CKD-EPI 2021: 88 ML/MIN/1.73M2
EOSINOPHIL # BLD AUTO: 0.1 10E3/UL (ref 0–0.7)
EOSINOPHIL NFR BLD AUTO: 1 %
ERYTHROCYTE [DISTWIDTH] IN BLOOD BY AUTOMATED COUNT: 12.9 % (ref 10–15)
GLUCOSE SERPL-MCNC: 94 MG/DL (ref 70–99)
HCO3 SERPL-SCNC: 23 MMOL/L (ref 22–29)
HCT VFR BLD AUTO: 42.2 % (ref 40–53)
HGB BLD-MCNC: 14.3 G/DL (ref 13.3–17.7)
IMM GRANULOCYTES # BLD: 0 10E3/UL
IMM GRANULOCYTES NFR BLD: 0 %
LDH SERPL L TO P-CCNC: 148 U/L (ref 0–250)
LYMPHOCYTES # BLD AUTO: 1.3 10E3/UL (ref 0.8–5.3)
LYMPHOCYTES NFR BLD AUTO: 20 %
MAGNESIUM SERPL-MCNC: 2.1 MG/DL (ref 1.7–2.3)
MCH RBC QN AUTO: 30.9 PG (ref 26.5–33)
MCHC RBC AUTO-ENTMCNC: 33.9 G/DL (ref 31.5–36.5)
MCV RBC AUTO: 91 FL (ref 78–100)
MONOCYTES # BLD AUTO: 0.7 10E3/UL (ref 0–1.3)
MONOCYTES NFR BLD AUTO: 10 %
NEUTROPHILS # BLD AUTO: 4.4 10E3/UL (ref 1.6–8.3)
NEUTROPHILS NFR BLD AUTO: 69 %
NRBC # BLD AUTO: 0 10E3/UL
NRBC BLD AUTO-RTO: 0 /100
PHOSPHATE SERPL-MCNC: 3 MG/DL (ref 2.5–4.5)
PLATELET # BLD AUTO: 227 10E3/UL (ref 150–450)
POTASSIUM SERPL-SCNC: 4.1 MMOL/L (ref 3.4–5.3)
PROT SERPL-MCNC: 7.7 G/DL (ref 6.4–8.3)
PROT/CREAT 24H UR: 0.12 MG/MG CR (ref 0–0.2)
RBC # BLD AUTO: 4.63 10E6/UL (ref 4.4–5.9)
SODIUM SERPL-SCNC: 139 MMOL/L (ref 135–145)
T CELL EXTENDED COMMENT: ABNORMAL
TOTAL PROTEIN SERUM FOR ELP: 7.6 G/DL (ref 6.4–8.3)
URATE SERPL-MCNC: 6.2 MG/DL (ref 3.4–7)
WBC # BLD AUTO: 6.4 10E3/UL (ref 4–11)

## 2024-10-25 PROCEDURE — A9552 F18 FDG: HCPCS | Performed by: INTERNAL MEDICINE

## 2024-10-25 PROCEDURE — 83521 IG LIGHT CHAINS FREE EACH: CPT | Performed by: INTERNAL MEDICINE

## 2024-10-25 PROCEDURE — 88161 CYTOPATH SMEAR OTHER SOURCE: CPT | Mod: TC | Performed by: INTERNAL MEDICINE

## 2024-10-25 PROCEDURE — 84550 ASSAY OF BLOOD/URIC ACID: CPT | Performed by: INTERNAL MEDICINE

## 2024-10-25 PROCEDURE — 88305 TISSUE EXAM BY PATHOLOGIST: CPT | Mod: TC | Performed by: INTERNAL MEDICINE

## 2024-10-25 PROCEDURE — 83615 LACTATE (LD) (LDH) ENZYME: CPT | Performed by: INTERNAL MEDICINE

## 2024-10-25 PROCEDURE — 86335 IMMUNFIX E-PHORSIS/URINE/CSF: CPT | Mod: 26 | Performed by: STUDENT IN AN ORGANIZED HEALTH CARE EDUCATION/TRAINING PROGRAM

## 2024-10-25 PROCEDURE — 86335 IMMUNFIX E-PHORSIS/URINE/CSF: CPT | Performed by: STUDENT IN AN ORGANIZED HEALTH CARE EDUCATION/TRAINING PROGRAM

## 2024-10-25 PROCEDURE — 88341 IMHCHEM/IMCYTCHM EA ADD ANTB: CPT | Mod: 26 | Performed by: STUDENT IN AN ORGANIZED HEALTH CARE EDUCATION/TRAINING PROGRAM

## 2024-10-25 PROCEDURE — 88311 DECALCIFY TISSUE: CPT | Mod: 26 | Performed by: STUDENT IN AN ORGANIZED HEALTH CARE EDUCATION/TRAINING PROGRAM

## 2024-10-25 PROCEDURE — 36415 COLL VENOUS BLD VENIPUNCTURE: CPT | Performed by: INTERNAL MEDICINE

## 2024-10-25 PROCEDURE — 84100 ASSAY OF PHOSPHORUS: CPT | Performed by: INTERNAL MEDICINE

## 2024-10-25 PROCEDURE — 86355 B CELLS TOTAL COUNT: CPT | Performed by: INTERNAL MEDICINE

## 2024-10-25 PROCEDURE — 84165 PROTEIN E-PHORESIS SERUM: CPT | Mod: TC | Performed by: STUDENT IN AN ORGANIZED HEALTH CARE EDUCATION/TRAINING PROGRAM

## 2024-10-25 PROCEDURE — 84155 ASSAY OF PROTEIN SERUM: CPT | Performed by: INTERNAL MEDICINE

## 2024-10-25 PROCEDURE — 86334 IMMUNOFIX E-PHORESIS SERUM: CPT | Mod: 26 | Performed by: STUDENT IN AN ORGANIZED HEALTH CARE EDUCATION/TRAINING PROGRAM

## 2024-10-25 PROCEDURE — 85004 AUTOMATED DIFF WBC COUNT: CPT | Performed by: INTERNAL MEDICINE

## 2024-10-25 PROCEDURE — 88187 FLOWCYTOMETRY/READ 2-8: CPT | Mod: GC | Performed by: STUDENT IN AN ORGANIZED HEALTH CARE EDUCATION/TRAINING PROGRAM

## 2024-10-25 PROCEDURE — 88185 FLOWCYTOMETRY/TC ADD-ON: CPT | Performed by: INTERNAL MEDICINE

## 2024-10-25 PROCEDURE — 88305 TISSUE EXAM BY PATHOLOGIST: CPT | Mod: 26 | Performed by: STUDENT IN AN ORGANIZED HEALTH CARE EDUCATION/TRAINING PROGRAM

## 2024-10-25 PROCEDURE — 78816 PET IMAGE W/CT FULL BODY: CPT | Mod: 26 | Performed by: RADIOLOGY

## 2024-10-25 PROCEDURE — 38222 DX BONE MARROW BX & ASPIR: CPT | Mod: LT | Performed by: STUDENT IN AN ORGANIZED HEALTH CARE EDUCATION/TRAINING PROGRAM

## 2024-10-25 PROCEDURE — 82784 ASSAY IGA/IGD/IGG/IGM EACH: CPT | Performed by: INTERNAL MEDICINE

## 2024-10-25 PROCEDURE — 85097 BONE MARROW INTERPRETATION: CPT | Mod: GC | Performed by: STUDENT IN AN ORGANIZED HEALTH CARE EDUCATION/TRAINING PROGRAM

## 2024-10-25 PROCEDURE — 85060 BLOOD SMEAR INTERPRETATION: CPT | Mod: GC | Performed by: STUDENT IN AN ORGANIZED HEALTH CARE EDUCATION/TRAINING PROGRAM

## 2024-10-25 PROCEDURE — 88342 IMHCHEM/IMCYTCHM 1ST ANTB: CPT | Mod: 26 | Performed by: STUDENT IN AN ORGANIZED HEALTH CARE EDUCATION/TRAINING PROGRAM

## 2024-10-25 PROCEDURE — 86359 T CELLS TOTAL COUNT: CPT | Performed by: INTERNAL MEDICINE

## 2024-10-25 PROCEDURE — 84165 PROTEIN E-PHORESIS SERUM: CPT | Mod: 26 | Performed by: STUDENT IN AN ORGANIZED HEALTH CARE EDUCATION/TRAINING PROGRAM

## 2024-10-25 PROCEDURE — 86334 IMMUNOFIX E-PHORESIS SERUM: CPT | Performed by: STUDENT IN AN ORGANIZED HEALTH CARE EDUCATION/TRAINING PROGRAM

## 2024-10-25 PROCEDURE — 84156 ASSAY OF PROTEIN URINE: CPT | Performed by: INTERNAL MEDICINE

## 2024-10-25 PROCEDURE — 38221 DX BONE MARROW BIOPSIES: CPT | Performed by: STUDENT IN AN ORGANIZED HEALTH CARE EDUCATION/TRAINING PROGRAM

## 2024-10-25 PROCEDURE — 343N000001 HC RX 343 MED OP 636: Performed by: INTERNAL MEDICINE

## 2024-10-25 PROCEDURE — 80053 COMPREHEN METABOLIC PANEL: CPT | Performed by: INTERNAL MEDICINE

## 2024-10-25 PROCEDURE — 83735 ASSAY OF MAGNESIUM: CPT | Performed by: INTERNAL MEDICINE

## 2024-10-25 PROCEDURE — 84166 PROTEIN E-PHORESIS/URINE/CSF: CPT | Mod: 26 | Performed by: STUDENT IN AN ORGANIZED HEALTH CARE EDUCATION/TRAINING PROGRAM

## 2024-10-25 PROCEDURE — 78816 PET IMAGE W/CT FULL BODY: CPT | Mod: PS

## 2024-10-25 PROCEDURE — 84166 PROTEIN E-PHORESIS/URINE/CSF: CPT | Performed by: STUDENT IN AN ORGANIZED HEALTH CARE EDUCATION/TRAINING PROGRAM

## 2024-10-25 RX ORDER — FLUDEOXYGLUCOSE F 18 200 MCI/ML
10-18 INJECTION, SOLUTION INTRAVENOUS ONCE
Status: COMPLETED | OUTPATIENT
Start: 2024-10-25 | End: 2024-10-25

## 2024-10-25 RX ADMIN — FLUDEOXYGLUCOSE F 18 13.8 MILLICURIE: 200 INJECTION, SOLUTION INTRAVENOUS at 09:21

## 2024-10-25 NOTE — NURSING NOTE
Oncology Rooming Note    October 25, 2024 1:48 PM   Inocente Romero is a 64 year old male who presents for:    Chief Complaint   Patient presents with    RECHECK     Pt here for Day 100 BMBX and Labs. Pt is s/p BMT for Multiple Myeloma.      Initial Vitals: BP (!) 143/81   Pulse 78   Temp 97.9  F (36.6  C) (Oral)   Resp 12   Wt 107 kg (235 lb 14.3 oz)   SpO2 98%   BMI 31.99 kg/m   Estimated body mass index is 31.99 kg/m  as calculated from the following:    Height as of 7/25/24: 1.829 m (6').    Weight as of this encounter: 107 kg (235 lb 14.3 oz). Body surface area is 2.33 meters squared.  Data Unavailable Comment: Data Unavailable   No LMP for male patient.  Allergies reviewed: Yes  Medications reviewed: Yes    Medications: Medication refills not needed today.  Pharmacy name entered into EPIC: [facility] - [place], [place] - [address] [facility]    Frailty Screening:   Is the patient here for a new oncology consult visit in cancer care? 2. No      Clinical concerns: Pt here for BMBX today, accompanied by his wife. Pt is feeling well; with no complaints. Pt does not want Versed with today's procedure. Pt not taking any blood thinners.   [facility] was notified.      Nely Shankar RN

## 2024-10-25 NOTE — PROGRESS NOTES
BMBX Teaching and Assessment       Teaching concerns addressed: Bone marrow biopsy and infection prevention.     Person(s) involved in teaching: Patient  Motivation Level  Asks Questions: Yes  Eager to Learn: Yes  Cooperative: Yes  Receptive (willing/able to accept information): Yes    Patient demonstrates understanding of the following:     Reason for the appointment, diagnosis and treatment plan: Yes  Knowledge of proper use of medications and conditions for which they are ordered (with special attention to potential side effects or drug interactions): Yes  Which situations necessitate calling provider and whom to contact: Yes    Teaching concerns addressed:   Reviewed activity restrictions if received premeds, potential for bleeding and actions to take if develops any of the issues below    Pain management techniques: Yes  Patient instructed on hand hygiene: Yes  How and/when to access community resources: Yes    Infection Control:  Patient demonstrates understanding of the following:   Bone marrow procedure site care taught: Yes  Signs and symptoms of infection taught: Yes       Instructional Materials Used/Given: Pt instructed to keep bmbx site clean and dry for 24hrs. Pt educated to monitor site for signs of infection such as redness, rash, oozing, puss, bleeding, pain, and elevated temp. Pt instructed to go to or call the [facility] triage line, or go to the ER if any signs of infection should occur. Pt educated to not operate machinery if receiving versed. Pt verbalized understanding.     Pre-procedure labs drawn via VPT by RN in Clinic.     Post procedure: Patient vital signs stable, ambulating, site is clean, dry and intact prior to dischage. Pt denies Pain. Pt discharged with Wife as .

## 2024-10-25 NOTE — PROGRESS NOTES
BMT ONC Adult Bone Marrow Biopsy Procedure Note  October 25, 2024  BP (!) 143/81   Pulse 78   Temp 97.9  F (36.6  C) (Oral)   Resp 12   Wt 107 kg (235 lb 14.3 oz)   SpO2 98%   BMI 31.99 kg/m       Learning needs assessment complete within 12 months? YES    DIAGNOSIS: multiple myeloma, s/p BMT    PROCEDURE: Unilateral Bone Marrow Biopsy and Unilateral Aspirate    [place] [facility] [place]    Patient s identification was positively verified by verbal identification and invasive procedure safety checklist was completed. Informed consent was obtained. Without pre-medication, patient was placed in the prone position and prepped and draped in a sterile manner. Approximately 8 cc of 1% Lidocaine was used over the left posterior iliac spine. Following this a 3 mm incision was made. Trephine bone marrow core(s) was (were) obtained from the [facility]. Bone marrow aspirates were obtained from the [facility]. Aspirates were sent for morphology and flow. A total of approximately 10 ml of marrow was aspirated. Following this procedure a sterile dressing was applied to the bone marrow biopsy site(s). The patient was placed in the supine position to maintain pressure on the biopsy site. Post-procedure wound care instructions were given.     Complications: NO    Pre-procedural pain: 0 out of 10 on the numeric pain rating scale.     Procedural pain: 5 out of 10 on the numeric pain rating scale.     Post-procedural pain assessment: 0 out of 10 on the numeric pain rating scale.     Interventions: NO    Length of procedure:20 minutes or less      Procedure performed by: Lillie Umaña PA-C

## 2024-10-25 NOTE — LETTER
10/25/2024      Inocente Romero  [address] [place] [address]      Dear Colleague,    Thank you for referring your patient, Inocente Romero, to the [facility] BLOOD AND MARROW TRANSPLANT PROGRAM [place]. Please see a copy of my visit note below.    BMT ONC Adult Bone Marrow Biopsy Procedure Note  October 25, 2024  BP (!) 143/81   Pulse 78   Temp 97.9  F (36.6  C) (Oral)   Resp 12   Wt 107 kg (235 lb 14.3 oz)   SpO2 98%   BMI 31.99 kg/m       Learning needs assessment complete within 12 months? YES    DIAGNOSIS: multiple myeloma, s/p BMT    PROCEDURE: Unilateral Bone Marrow Biopsy and Unilateral Aspirate    LOCATION: [facility] 2nd Floor    Patient s identification was positively verified by verbal identification and invasive procedure safety checklist was completed. Informed consent was obtained. Without pre-medication, patient was placed in the prone position and prepped and draped in a sterile manner. Approximately 8 cc of 1% Lidocaine was used over the left posterior iliac spine. Following this a 3 mm incision was made. Trephine bone marrow core(s) was (were) obtained from the [facility]. Bone marrow aspirates were obtained from the [facility]. Aspirates were sent for morphology and flow. A total of approximately 10 ml of marrow was aspirated. Following this procedure a sterile dressing was applied to the bone marrow biopsy site(s). The patient was placed in the supine position to maintain pressure on the biopsy site. Post-procedure wound care instructions were given.     Complications: NO    Pre-procedural pain: 0 out of 10 on the numeric pain rating scale.     Procedural pain: 5 out of 10 on the numeric pain rating scale.     Post-procedural pain assessment: 0 out of 10 on the numeric pain rating scale.     Interventions: NO    Length of procedure:20 minutes or less      Procedure performed by: Lillie Umaña PA-C        BMBX Teaching and Assessment       Teaching concerns addressed: Bone  marrow biopsy and infection prevention.     Person(s) involved in teaching: Patient  Motivation Level  Asks Questions: Yes  Eager to Learn: Yes  Cooperative: Yes  Receptive (willing/able to accept information): Yes    Patient demonstrates understanding of the following:     Reason for the appointment, diagnosis and treatment plan: Yes  Knowledge of proper use of medications and conditions for which they are ordered (with special attention to potential side effects or drug interactions): Yes  Which situations necessitate calling provider and whom to contact: Yes    Teaching concerns addressed:   Reviewed activity restrictions if received premeds, potential for bleeding and actions to take if develops any of the issues below    Pain management techniques: Yes  Patient instructed on hand hygiene: Yes  How and/when to access community resources: Yes    Infection Control:  Patient demonstrates understanding of the following:   Bone marrow procedure site care taught: Yes  Signs and symptoms of infection taught: Yes       Instructional Materials Used/Given: Pt instructed to keep bmbx site clean and dry for 24hrs. Pt educated to monitor site for signs of infection such as redness, rash, oozing, puss, bleeding, pain, and elevated temp. Pt instructed to go to or call the [facility] triage line, or go to the ER if any signs of infection should occur. Pt educated to not operate machinery if receiving versed. Pt verbalized understanding.     Pre-procedure labs drawn via VPT by RN in Clinic.     Post procedure: Patient vital signs stable, ambulating, site is clean, dry and intact prior to dischage. Pt denies Pain. Pt discharged with Wife as .           Again, thank you for allowing me to participate in the care of your patient.        Sincerely,        BMT Advanced Practice Provider

## 2024-10-28 LAB
ALBUMIN MFR UR ELPH: 67.2 %
ALBUMIN SERPL ELPH-MCNC: 4.8 G/DL (ref 3.7–5.1)
ALPHA1 GLOB MFR UR ELPH: 3.3 %
ALPHA1 GLOB SERPL ELPH-MCNC: 0.2 G/DL (ref 0.2–0.4)
ALPHA2 GLOB MFR UR ELPH: 9.4 %
ALPHA2 GLOB SERPL ELPH-MCNC: 0.9 G/DL (ref 0.5–0.9)
B-GLOBULIN MFR UR ELPH: 9.5 %
B-GLOBULIN SERPL ELPH-MCNC: 0.7 G/DL (ref 0.6–1)
GAMMA GLOB MFR UR ELPH: 10.6 %
GAMMA GLOB SERPL ELPH-MCNC: 1 G/DL (ref 0.7–1.6)
IGA SERPL-MCNC: 117 MG/DL (ref 84–499)
IGG SERPL-MCNC: 1100 MG/DL (ref 610–1616)
IGM SERPL-MCNC: 36 MG/DL (ref 35–242)
KAPPA LC FREE SER-MCNC: 1.13 MG/DL (ref 0.33–1.94)
KAPPA LC FREE/LAMBDA FREE SER NEPH: 2.69 {RATIO} (ref 0.26–1.65)
LAMBDA LC FREE SERPL-MCNC: 0.42 MG/DL (ref 0.57–2.63)
LOCATION OF TASK: ABNORMAL
LOCATION OF TASK: ABNORMAL
LOCATION OF TASK: NORMAL
LOCATION OF TASK: NORMAL
M PROTEIN MFR UR ELPH: 8.1 %
M PROTEIN SERPL ELPH-MCNC: 0.4 G/DL
PATH REPORT.COMMENTS IMP SPEC: ABNORMAL
PATH REPORT.COMMENTS IMP SPEC: YES
PATH REPORT.COMMENTS IMP SPEC: YES
PATH REPORT.FINAL DX SPEC: ABNORMAL
PATH REPORT.FINAL DX SPEC: ABNORMAL
PATH REPORT.GROSS SPEC: ABNORMAL
PATH REPORT.MICROSCOPIC SPEC OTHER STN: ABNORMAL
PATH REPORT.RELEVANT HX SPEC: ABNORMAL
PATH REPORT.RELEVANT HX SPEC: ABNORMAL
PROT ELPH PNL UR ELPH: NORMAL
PROT PATTERN SERPL ELPH-IMP: ABNORMAL
PROT PATTERN SERPL IFE-IMP: NORMAL
PROT PATTERN UR ELPH-IMP: ABNORMAL

## 2024-11-01 ENCOUNTER — OFFICE VISIT (OUTPATIENT)
Dept: TRANSPLANT | Facility: CLINIC | Age: 64
End: 2024-11-01
Attending: INTERNAL MEDICINE

## 2024-11-01 VITALS
BODY MASS INDEX: 32.21 KG/M2 | WEIGHT: 237.5 LBS | TEMPERATURE: 97.6 F | RESPIRATION RATE: 16 BRPM | SYSTOLIC BLOOD PRESSURE: 133 MMHG | DIASTOLIC BLOOD PRESSURE: 82 MMHG | OXYGEN SATURATION: 98 % | HEART RATE: 84 BPM

## 2024-11-01 DIAGNOSIS — C90.00 MULTIPLE MYELOMA, REMISSION STATUS UNSPECIFIED (H): Primary | ICD-10-CM

## 2024-11-01 DIAGNOSIS — Z94.81 BONE MARROW TRANSPLANT STATUS (H): ICD-10-CM

## 2024-11-01 PROCEDURE — 250N000011 HC RX IP 250 OP 636: Performed by: INTERNAL MEDICINE

## 2024-11-01 PROCEDURE — 90673 RIV3 VACCINE NO PRESERV IM: CPT | Performed by: INTERNAL MEDICINE

## 2024-11-01 PROCEDURE — 99215 OFFICE O/P EST HI 40 MIN: CPT

## 2024-11-01 PROCEDURE — G2211 COMPLEX E/M VISIT ADD ON: HCPCS

## 2024-11-01 PROCEDURE — G0008 ADMIN INFLUENZA VIRUS VAC: HCPCS | Performed by: INTERNAL MEDICINE

## 2024-11-01 PROCEDURE — 99213 OFFICE O/P EST LOW 20 MIN: CPT

## 2024-11-01 RX ADMIN — INFLUENZA A VIRUS A/WEST VIRGINIA/30/2022 (H1N1) RECOMBINANT HEMAGGLUTININ ANTIGEN, INFLUENZA A VIRUS A/MASSACHUSETTS/18/2022 (H3N2) RECOMBINANT HEMAGGLUTININ ANTIGEN, AND INFLUENZA B VIRUS B/AUSTRIA/1359417/2021 RECOMBINANT HEMAGGLUTININ ANTIGEN 0.5 ML: 45; 45; 45 INJECTION INTRAMUSCULAR at 15:39

## 2024-11-01 ASSESSMENT — PAIN SCALES - GENERAL: PAINLEVEL_OUTOF10: NO PAIN (0)

## 2024-11-01 NOTE — NURSING NOTE
S: Pt scheduled for influenza vaccine vaccines.  B: Pt with hx of stem cell transplant.  A: Pt assessed by provider. Vaccine information sheet given to patient. Influenza vaccines administered in left deltoid. Pt monitored for 15 mins post inj. See MAR for details of vaccines given.   R: Pt assessed post vaccines. Denies signs and symptoms of reactions. Pt discharged in stable condition.

## 2024-11-01 NOTE — NURSING NOTE
Oncology Rooming Note    November 1, 2024 2:19 PM   Inocente Romero is a 64 year old male who presents for:    Chief Complaint   Patient presents with    Oncology Clinic Visit     Multiple myeloma, remission status unspecified         Initial Vitals: There were no vitals taken for this visit. Estimated body mass index is 31.99 kg/m  as calculated from the following:    Height as of 7/25/24: 1.829 m (6').    Weight as of 10/25/24: 107 kg (235 lb 14.3 oz). There is no height or weight on file to calculate BSA.  No Pain (0) Comment: Data Unavailable   No LMP for male patient.  Allergies reviewed: Yes  Medications reviewed: Yes    Medications: Medication refills not needed today.  Pharmacy name entered into EPIC:    [facility] - [place], [place] - [facility] - [place], [place] - [address] [place] [address] DRIVE    Frailty Screening:   Is the patient here for a new oncology consult visit in cancer care? 2. No      Clinical concerns: none       Abida Blackburn

## 2024-11-01 NOTE — LETTER
11/1/2024      Inocente Romero  [address] [address]      Dear Colleague,    Thank you for referring your patient, Inocente Romero, to the [facility] BLOOD AND MARROW TRANSPLANT PROGRAM [place]. Please see a copy of my visit note below.    Date: Nov 1, 2024    Chief Complaint: bmt d+ 108     History Of Present Illness:    Doing well. Recovering nicely.    ROS: 14-point ROS is negative unless otherwise stated in HPI.    Oncology History from Prior Notes:  Standard risk MM, s/p VRd induction and trent 200 ASCT.    Medications:  Current Outpatient Medications   Medication Sig Dispense Refill     acyclovir (ZOVIRAX) 800 MG tablet Take 1 tablet (800 mg) by mouth 2 times daily Start Day -1 60 tablet 11     amLODIPine (NORVASC) 10 MG tablet Take 1 tablet (10 mg) by mouth daily 30 tablet 11     Cyanocobalamin 1000 MCG/ML KIT Inject 1,000 mcg as directed every 30 days.       lisinopril (ZESTRIL) 20 MG tablet Take 20 mg by mouth every morning       magnesium 200 MG TABS Take 200 mg by mouth daily       metoprolol succinate ER (TOPROL XL) 100 MG 24 hr tablet Take 100 mg by mouth every evening       OLANZapine (ZYPREXA) 2.5 MG tablet Take 5 mg by mouth nightly as needed (sleep problems).       ondansetron (ZOFRAN) 8 MG tablet Take 1 tablet (8 mg) by mouth every 8 hours as needed (for nausea / vomiting) (Patient not taking: Reported on 10/25/2024) 30 tablet 0     potassium chloride ER (K-TAB/KLOR-CON) 10 MEQ CR tablet Take 10 mEq by mouth daily.       prochlorperazine (COMPAZINE) 5 MG tablet Take 1-2 tablets (5-10 mg) by mouth every 6 hours as needed for nausea or vomiting (Patient not taking: Reported on 10/25/2024) 30 tablet 1     sulfamethoxazole-trimethoprim (BACTRIM DS) 800-160 MG tablet Take 1 tablet by mouth Every Mon, Tues two times daily Starting 8/12/24, continue for 1 year post BMT. 30 tablet 5     No current facility-administered medications for this visit.     Updated PMH:  No past  medical history on file.  Updated PSH:  Past Surgical History:   Procedure Laterality Date     INSERT CATHETER VASCULAR ACCESS Right 7/8/2024    Procedure: central venous catheter placement, tunneled Insert vascular access;  Surgeon: Arnold Butler MD;  Location: [facility] OR     IR CVC TUNNEL PLACEMENT > 5 YRS OF AGE  7/8/2024     IR CVC TUNNEL REMOVAL RIGHT  8/13/2024     Updated FH:  No family history on file.  Updated SH:  Social History     Tobacco Use     Smoking status: Never     Smokeless tobacco: Never     Physical Exam:  Vitals: /82 (BP Location: Right arm, Patient Position: Sitting, Cuff Size: Adult Large)   Pulse 84   Temp 97.6  F (36.4  C) (Oral)   Resp 16   Wt 107.7 kg (237 lb 8 oz)   SpO2 98%   BMI 32.21 kg/m    Wt Readings from Last 3 Encounters:   11/01/24 107.7 kg (237 lb 8 oz)   10/25/24 107 kg (235 lb 14.3 oz)   08/15/24 103.9 kg (229 lb)      GA: NAD, appears as stated age  Skin: No acute rashes, no lesions  Psyc: appropriate, reactive    KPS: 80    Labs, pathology and other diagnostics reviewed    WBC Count   Date Value Ref Range Status   10/25/2024 6.4 4.0 - 11.0 10e3/uL Final     Hemoglobin   Date Value Ref Range Status   10/25/2024 14.3 13.3 - 17.7 g/dL Final     Platelet Count   Date Value Ref Range Status   10/25/2024 227 150 - 450 10e3/uL Final     Creatinine   Date Value Ref Range Status   10/25/2024 0.97 0.67 - 1.17 mg/dL Final     Potassium   Date Value Ref Range Status   10/25/2024 4.1 3.4 - 5.3 mmol/L Final     Magnesium   Date Value Ref Range Status   10/25/2024 2.1 1.7 - 2.3 mg/dL Final         A/P    #MM  - In VGPR. Still has MRD by flow but decreased from pre-transplant. Discussed needing to start maintenance to deepen remission.    #Health maintenance  - Discussed restrictions extensively. He was under the impression that normal WBC means normal immunity. He will get a flu shot today but declined COVID.    RTC in 3 months.    40 minutes spent on the date of the  encounter doing chart review, interpretation of results, patient visit, documentation and coordination of care.    The longitudinal plan of care for the conditions as documented were addressed during this visit. Due to the added complexity in care, I will continue to support Felix in the subsequent management and with ongoing continuity of care.              Again, thank you for allowing me to participate in the care of your patient.        Sincerely,         BMT Auto Cell Therapy

## 2024-11-02 NOTE — PROGRESS NOTES
Date: Nov 1, 2024    Chief Complaint: bmt d+ 108     History Of Present Illness:    Doing well. Recovering nicely.    ROS: 14-point ROS is negative unless otherwise stated in HPI.    Oncology History from Prior Notes:  Standard risk MM, s/p VRd induction and trent 200 ASCT.    Medications:  Current Outpatient Medications   Medication Sig Dispense Refill    acyclovir (ZOVIRAX) 800 MG tablet Take 1 tablet (800 mg) by mouth 2 times daily Start Day -1 60 tablet 11    amLODIPine (NORVASC) 10 MG tablet Take 1 tablet (10 mg) by mouth daily 30 tablet 11    Cyanocobalamin 1000 MCG/ML KIT Inject 1,000 mcg as directed every 30 days.      lisinopril (ZESTRIL) 20 MG tablet Take 20 mg by mouth every morning      magnesium 200 MG TABS Take 200 mg by mouth daily      metoprolol succinate ER (TOPROL XL) 100 MG 24 hr tablet Take 100 mg by mouth every evening      OLANZapine (ZYPREXA) 2.5 MG tablet Take 5 mg by mouth nightly as needed (sleep problems).      ondansetron (ZOFRAN) 8 MG tablet Take 1 tablet (8 mg) by mouth every 8 hours as needed (for nausea / vomiting) (Patient not taking: Reported on 10/25/2024) 30 tablet 0    potassium chloride ER (K-TAB/KLOR-CON) 10 MEQ CR tablet Take 10 mEq by mouth daily.      prochlorperazine (COMPAZINE) 5 MG tablet Take 1-2 tablets (5-10 mg) by mouth every 6 hours as needed for nausea or vomiting (Patient not taking: Reported on 10/25/2024) 30 tablet 1    sulfamethoxazole-trimethoprim (BACTRIM DS) 800-160 MG tablet Take 1 tablet by mouth Every Mon, Tues two times daily Starting 8/12/24, continue for 1 year post BMT. 30 tablet 5     No current facility-administered medications for this visit.     Updated PMH:  No past medical history on file.  Updated PSH:  Past Surgical History:   Procedure Laterality Date    INSERT CATHETER VASCULAR ACCESS Right 7/8/2024    Procedure: central venous catheter placement, tunneled Insert vascular access;  Surgeon: Arnold Butler MD;  Location: [facility] OR    IR CVC  TUNNEL PLACEMENT > 5 YRS OF AGE  7/8/2024    IR CVC TUNNEL REMOVAL RIGHT  8/13/2024     Updated FH:  No family history on file.  Updated SH:  Social History     Tobacco Use    Smoking status: Never    Smokeless tobacco: Never     Physical Exam:  Vitals: /82 (BP Location: Right arm, Patient Position: Sitting, Cuff Size: Adult Large)   Pulse 84   Temp 97.6  F (36.4  C) (Oral)   Resp 16   Wt 107.7 kg (237 lb 8 oz)   SpO2 98%   BMI 32.21 kg/m    Wt Readings from Last 3 Encounters:   11/01/24 107.7 kg (237 lb 8 oz)   10/25/24 107 kg (235 lb 14.3 oz)   08/15/24 103.9 kg (229 lb)      GA: NAD, appears as stated age  Skin: No acute rashes, no lesions  Psyc: appropriate, reactive    KPS: 80    Labs, pathology and other diagnostics reviewed    WBC Count   Date Value Ref Range Status   10/25/2024 6.4 4.0 - 11.0 10e3/uL Final     Hemoglobin   Date Value Ref Range Status   10/25/2024 14.3 13.3 - 17.7 g/dL Final     Platelet Count   Date Value Ref Range Status   10/25/2024 227 150 - 450 10e3/uL Final     Creatinine   Date Value Ref Range Status   10/25/2024 0.97 0.67 - 1.17 mg/dL Final     Potassium   Date Value Ref Range Status   10/25/2024 4.1 3.4 - 5.3 mmol/L Final     Magnesium   Date Value Ref Range Status   10/25/2024 2.1 1.7 - 2.3 mg/dL Final         A/P    #MM  - In VGPR. Still has MRD by flow but decreased from pre-transplant. Discussed needing to start maintenance to deepen remission.    #Health maintenance  - Discussed restrictions extensively. He was under the impression that normal WBC means normal immunity. He will get a flu shot today but declined COVID.    RTC in 3 months.    40 minutes spent on the date of the encounter doing chart review, interpretation of results, patient visit, documentation and coordination of care.    The longitudinal plan of care for the conditions as documented were addressed during this visit. Due to the added complexity in care, I will continue to support Felix in the  subsequent management and with ongoing continuity of care.

## 2024-11-11 LAB
ABC 95% CONFIDENCE INTERVAL (B-CELL): NORMAL
ABC CLONOSEQ B-CELL TRACKING (MRD) RESULT: NORMAL
ABC DOMINANT SEQUENCES (B-CELL): 2
ABC RESIDUAL CLONAL CELLS/ MILL NUCLEATED CELLS BCELL: 350 PER MILLION NUCLEATED CELLS

## 2025-01-13 ENCOUNTER — LAB (OUTPATIENT)
Dept: LAB | Facility: CLINIC | Age: 65
End: 2025-01-13
Attending: INTERNAL MEDICINE

## 2025-01-13 DIAGNOSIS — C90.00 MULTIPLE MYELOMA, REMISSION STATUS UNSPECIFIED (H): Primary | ICD-10-CM

## 2025-01-13 LAB
ALBUMIN MFR UR ELPH: 41.1 MG/DL
ALBUMIN SERPL BCG-MCNC: 4.5 G/DL (ref 3.5–5.2)
ALP SERPL-CCNC: 64 U/L (ref 40–150)
ALT SERPL W P-5'-P-CCNC: 22 U/L (ref 0–70)
ANION GAP SERPL CALCULATED.3IONS-SCNC: 12 MMOL/L (ref 7–15)
AST SERPL W P-5'-P-CCNC: 17 U/L (ref 0–45)
BASOPHILS # BLD AUTO: 0 10E3/UL (ref 0–0.2)
BASOPHILS NFR BLD AUTO: 1 %
BILIRUB SERPL-MCNC: 0.5 MG/DL
BUN SERPL-MCNC: 13.3 MG/DL (ref 8–23)
CALCIUM SERPL-MCNC: 9.1 MG/DL (ref 8.8–10.4)
CD19 CELLS # BLD: 123 CELLS/UL (ref 107–698)
CD19 CELLS NFR BLD: 17 % (ref 6–27)
CD3 CELLS # BLD: 417 CELLS/UL (ref 603–2990)
CD3 CELLS NFR BLD: 57 % (ref 49–84)
CD3+CD4+ CELLS # BLD: 308 CELLS/UL (ref 441–2156)
CD3+CD4+ CELLS NFR BLD: 42 % (ref 28–63)
CD3+CD4+ CELLS/CD3+CD8+ CLL BLD: 2.89 % (ref 1.4–2.6)
CD3+CD8+ CELLS # BLD: 106 CELLS/UL (ref 125–1312)
CD3+CD8+ CELLS NFR BLD: 14 % (ref 10–40)
CD3-CD16+CD56+ CELLS # BLD: 187 CELLS/UL (ref 95–640)
CD3-CD16+CD56+ CELLS NFR BLD: 25 % (ref 4–25)
CHLORIDE SERPL-SCNC: 104 MMOL/L (ref 98–107)
CREAT SERPL-MCNC: 0.84 MG/DL (ref 0.67–1.17)
CREAT UR-MCNC: 202 MG/DL
EGFRCR SERPLBLD CKD-EPI 2021: >90 ML/MIN/1.73M2
EOSINOPHIL # BLD AUTO: 0.1 10E3/UL (ref 0–0.7)
EOSINOPHIL NFR BLD AUTO: 2 %
ERYTHROCYTE [DISTWIDTH] IN BLOOD BY AUTOMATED COUNT: 14.4 % (ref 10–15)
GLUCOSE SERPL-MCNC: 89 MG/DL (ref 70–99)
HCO3 SERPL-SCNC: 24 MMOL/L (ref 22–29)
HCT VFR BLD AUTO: 39.8 % (ref 40–53)
HGB BLD-MCNC: 13.7 G/DL (ref 13.3–17.7)
IMM GRANULOCYTES # BLD: 0 10E3/UL
IMM GRANULOCYTES NFR BLD: 1 %
LDH SERPL L TO P-CCNC: 138 U/L (ref 0–250)
LYMPHOCYTES # BLD AUTO: 0.8 10E3/UL (ref 0.8–5.3)
LYMPHOCYTES NFR BLD AUTO: 9 %
MAGNESIUM SERPL-MCNC: 1.9 MG/DL (ref 1.7–2.3)
MCH RBC QN AUTO: 30.3 PG (ref 26.5–33)
MCHC RBC AUTO-ENTMCNC: 34.4 G/DL (ref 31.5–36.5)
MCV RBC AUTO: 88 FL (ref 78–100)
MONOCYTES # BLD AUTO: 1.2 10E3/UL (ref 0–1.3)
MONOCYTES NFR BLD AUTO: 14 %
NEUTROPHILS # BLD AUTO: 6.6 10E3/UL (ref 1.6–8.3)
NEUTROPHILS NFR BLD AUTO: 75 %
NRBC # BLD AUTO: 0 10E3/UL
NRBC BLD AUTO-RTO: 0 /100
PHOSPHATE SERPL-MCNC: 2.6 MG/DL (ref 2.5–4.5)
PLATELET # BLD AUTO: 184 10E3/UL (ref 150–450)
POTASSIUM SERPL-SCNC: 3.8 MMOL/L (ref 3.4–5.3)
PROT SERPL-MCNC: 7.5 G/DL (ref 6.4–8.3)
PROT/CREAT 24H UR: 0.2 MG/MG CR (ref 0–0.2)
RBC # BLD AUTO: 4.52 10E6/UL (ref 4.4–5.9)
SODIUM SERPL-SCNC: 140 MMOL/L (ref 135–145)
T CELL EXTENDED COMMENT: ABNORMAL
TOTAL PROTEIN SERUM FOR ELP: 7.2 G/DL (ref 6.4–8.3)
URATE SERPL-MCNC: 5.4 MG/DL (ref 3.4–7)
WBC # BLD AUTO: 8.7 10E3/UL (ref 4–11)

## 2025-01-13 PROCEDURE — 85004 AUTOMATED DIFF WBC COUNT: CPT | Performed by: INTERNAL MEDICINE

## 2025-01-13 PROCEDURE — 84155 ASSAY OF PROTEIN SERUM: CPT | Performed by: INTERNAL MEDICINE

## 2025-01-13 PROCEDURE — 84100 ASSAY OF PHOSPHORUS: CPT | Performed by: INTERNAL MEDICINE

## 2025-01-13 PROCEDURE — 84550 ASSAY OF BLOOD/URIC ACID: CPT | Performed by: INTERNAL MEDICINE

## 2025-01-13 PROCEDURE — 83735 ASSAY OF MAGNESIUM: CPT | Performed by: INTERNAL MEDICINE

## 2025-01-13 PROCEDURE — 86359 T CELLS TOTAL COUNT: CPT | Performed by: INTERNAL MEDICINE

## 2025-01-13 PROCEDURE — 36415 COLL VENOUS BLD VENIPUNCTURE: CPT | Performed by: INTERNAL MEDICINE

## 2025-01-13 PROCEDURE — 83521 IG LIGHT CHAINS FREE EACH: CPT | Performed by: INTERNAL MEDICINE

## 2025-01-13 PROCEDURE — 84166 PROTEIN E-PHORESIS/URINE/CSF: CPT | Mod: 26 | Performed by: PATHOLOGY

## 2025-01-13 PROCEDURE — 86360 T CELL ABSOLUTE COUNT/RATIO: CPT | Performed by: INTERNAL MEDICINE

## 2025-01-13 PROCEDURE — 82784 ASSAY IGA/IGD/IGG/IGM EACH: CPT | Performed by: INTERNAL MEDICINE

## 2025-01-13 PROCEDURE — 84166 PROTEIN E-PHORESIS/URINE/CSF: CPT | Performed by: PATHOLOGY

## 2025-01-13 PROCEDURE — 86334 IMMUNOFIX E-PHORESIS SERUM: CPT | Mod: 26 | Performed by: PATHOLOGY

## 2025-01-13 PROCEDURE — 84165 PROTEIN E-PHORESIS SERUM: CPT | Mod: TC | Performed by: PATHOLOGY

## 2025-01-13 PROCEDURE — 82306 VITAMIN D 25 HYDROXY: CPT | Performed by: INTERNAL MEDICINE

## 2025-01-13 PROCEDURE — 83615 LACTATE (LD) (LDH) ENZYME: CPT | Performed by: INTERNAL MEDICINE

## 2025-01-13 PROCEDURE — 84165 PROTEIN E-PHORESIS SERUM: CPT | Mod: 26 | Performed by: PATHOLOGY

## 2025-01-13 PROCEDURE — 86334 IMMUNOFIX E-PHORESIS SERUM: CPT | Performed by: PATHOLOGY

## 2025-01-13 PROCEDURE — 84156 ASSAY OF PROTEIN URINE: CPT | Performed by: INTERNAL MEDICINE

## 2025-01-13 NOTE — NURSING NOTE
Chief Complaint   Patient presents with    Labs Only    Blood Draw     Labs drawn via  by RN.     Labs collected from venipuncture by RN.     Leonie Clements RN

## 2025-01-14 LAB
ALBUMIN SERPL ELPH-MCNC: 4.1 G/DL (ref 3.7–5.1)
ALPHA1 GLOB SERPL ELPH-MCNC: 0.3 G/DL (ref 0.2–0.4)
ALPHA2 GLOB SERPL ELPH-MCNC: 0.9 G/DL (ref 0.5–0.9)
B-GLOBULIN SERPL ELPH-MCNC: 0.8 G/DL (ref 0.6–1)
DEPRECATED CALCIDIOL+CALCIFEROL SERPL-MC: <24 UG/L (ref 20–75)
GAMMA GLOB SERPL ELPH-MCNC: 1.1 G/DL (ref 0.7–1.6)
IGA SERPL-MCNC: 176 MG/DL (ref 84–499)
IGG SERPL-MCNC: 1064 MG/DL (ref 610–1616)
IGM SERPL-MCNC: 41 MG/DL (ref 35–242)
KAPPA LC FREE SER-MCNC: 2 MG/DL (ref 0.33–1.94)
KAPPA LC FREE/LAMBDA FREE SER NEPH: 1.56 {RATIO} (ref 0.26–1.65)
LAMBDA LC FREE SERPL-MCNC: 1.28 MG/DL (ref 0.57–2.63)
M PROTEIN SERPL ELPH-MCNC: 0.6 G/DL
PROT PATTERN SERPL ELPH-IMP: ABNORMAL
PROT PATTERN SERPL IFE-IMP: NORMAL
PROT PATTERN UR ELPH-IMP: NORMAL
VITAMIN D2 SERPL-MCNC: <5 UG/L
VITAMIN D3 SERPL-MCNC: 19 UG/L

## 2025-01-14 NOTE — PROGRESS NOTES
Date: Ankit 15, 2025    Chief Complaint: bmt d+ 183     History Of Present Illness:  Felix is here today with wife Abiola for D180 BAN. Doing pretty well overall. Started maintenance Revlimid/Velcade about 2 months ago. Tolerating well so far other than dry skin. No fevers, recent infections, SOB, N/V, diarrhea, bleeding. Energy and appetite improving.     ROS: 10-point ROS is negative unless otherwise stated in HPI.    Oncology History from Prior Notes:  Standard risk MM, s/p VRd induction and trent 200 ASCT on 7/16/24.     Medications:  Current Outpatient Medications   Medication Sig Dispense Refill    acyclovir (ZOVIRAX) 800 MG tablet Take 1 tablet (800 mg) by mouth 2 times daily Start Day -1 60 tablet 11    amLODIPine (NORVASC) 10 MG tablet Take 1 tablet (10 mg) by mouth daily 30 tablet 11    aspirin (ASA) 81 MG chewable tablet       Bortezomib (VELCADE IJ) Inject as directed.      Cyanocobalamin 1000 MCG/ML KIT Inject 1,000 mcg as directed every 30 days.      LENalidomide (REVLIMID) 10 MG CAPS capsule       lisinopril (ZESTRIL) 20 MG tablet Take 20 mg by mouth every morning      loperamide (IMODIUM A-D) 2 MG tablet       magnesium 200 MG TABS Take 200 mg by mouth daily      metoprolol succinate ER (TOPROL XL) 100 MG 24 hr tablet Take 100 mg by mouth every evening      OLANZapine (ZYPREXA) 2.5 MG tablet Take 5 mg by mouth nightly as needed (sleep problems).      potassium chloride ER (K-TAB/KLOR-CON) 10 MEQ CR tablet Take 10 mEq by mouth daily.      Zoledronic Acid (ZOMETA IV)       ondansetron (ZOFRAN) 8 MG tablet Take 1 tablet (8 mg) by mouth every 8 hours as needed (for nausea / vomiting) (Patient not taking: Reported on 8/15/2024) 30 tablet 0    prochlorperazine (COMPAZINE) 5 MG tablet Take 1-2 tablets (5-10 mg) by mouth every 6 hours as needed for nausea or vomiting (Patient not taking: Reported on 7/31/2024) 30 tablet 1     No current facility-administered medications for this visit.     Updated PMH:  No past  medical history on file.    Updated PSH:  Past Surgical History:   Procedure Laterality Date    INSERT CATHETER VASCULAR ACCESS Right 7/8/2024    Procedure: central venous catheter placement, tunneled Insert vascular access;  Surgeon: Arnold Butler MD;  Location: [facility] OR    IR CVC TUNNEL PLACEMENT > 5 YRS OF AGE  7/8/2024    IR CVC TUNNEL REMOVAL RIGHT  8/13/2024     Updated FH:  No family history on file.  Updated SH:  Social History     Tobacco Use    Smoking status: Never    Smokeless tobacco: Never     Physical Exam:  Vitals: /76 (BP Location: Right arm, Patient Position: Sitting, Cuff Size: Adult Regular)   Pulse 68   Temp 97.8  F (36.6  C) (Oral)   Resp 20   Wt 108.8 kg (239 lb 14.4 oz)   SpO2 97%   BMI 32.54 kg/m    Wt Readings from Last 3 Encounters:   01/15/25 108.8 kg (239 lb 14.4 oz)   11/01/24 107.7 kg (237 lb 8 oz)   10/25/24 107 kg (235 lb 14.3 oz)      General: Awake, alert, in no acute distress.   HEENT: Normocephalic, atraumatic. No scleral icterus.   CV: Regular rate and rhythm. No murmurs, rubs, or gallops appreciated.  Resp: Good inspiratory effort, lungs clear to auscultation bilaterally.  GI: Abdomen soft, nontender, nondistended.   Ext: No peripheral edema bilaterally.  Neuro: CN II-XII grossly intact. No focal deficits appreciated.  Skin: No rash, unusual bruising or prominent lesions.  Psych: Pleasant, normal affect.     KPS: 80    Labs, pathology and other diagnostics reviewed    WBC Count   Date Value Ref Range Status   01/13/2025 8.7 4.0 - 11.0 10e3/uL Final     Hemoglobin   Date Value Ref Range Status   01/13/2025 13.7 13.3 - 17.7 g/dL Final     Platelet Count   Date Value Ref Range Status   01/13/2025 184 150 - 450 10e3/uL Final     Creatinine   Date Value Ref Range Status   01/13/2025 0.84 0.67 - 1.17 mg/dL Final     Potassium   Date Value Ref Range Status   01/13/2025 3.8 3.4 - 5.3 mmol/L Final     Magnesium   Date Value Ref Range Status   01/13/2025 1.9 1.7 - 2.3  mg/dL Final       A/P    #Standard risk MM  - Day: 183 from ASCT with melphalan conditioning  - Staging: VGPR from time diagnosis (M spike 6.35 -> 0.6) but M-spike did go up slightly from 0.4 at D100 to 0.6 now (IgG kappa on immunofix). Abiola showed me [facility] from last few months where it was 0.46 in Nov and 0.54 in Dec so it is going up slowly. Recommend to closely monitor M-spike monthly, if continues to increase may need to switch maintenance or switch to a rebeca-based regimen.   - Maintenance: continue dual maintenance with Velcade/Revlimid for now.      #ID  - Ppx: continue ACV for 1 year. Okay to stop Bactrim now that he is 6 months out.   - Vaccines: up to date on flu shot, remainder of childhood vaccines to start at 1 year.     PLAN:  - Monitor M-spike closely, may need to switch tx if progressing  - Otherwise RTC for 1 year BAN    Total of 40 minutes on patient visit, reviewing records, interpreting test results, placing orders, and documentation on the day of service.    Myesha Monet MD  Attending Physician, [facility]

## 2025-01-15 ENCOUNTER — OFFICE VISIT (OUTPATIENT)
Dept: TRANSPLANT | Facility: CLINIC | Age: 65
End: 2025-01-15
Attending: INTERNAL MEDICINE

## 2025-01-15 VITALS
WEIGHT: 239.9 LBS | SYSTOLIC BLOOD PRESSURE: 129 MMHG | BODY MASS INDEX: 32.54 KG/M2 | DIASTOLIC BLOOD PRESSURE: 76 MMHG | HEART RATE: 68 BPM | TEMPERATURE: 97.8 F | RESPIRATION RATE: 20 BRPM | OXYGEN SATURATION: 97 %

## 2025-01-15 DIAGNOSIS — C90.00 MULTIPLE MYELOMA, REMISSION STATUS UNSPECIFIED (H): Primary | ICD-10-CM

## 2025-01-15 DIAGNOSIS — Z94.81 BONE MARROW TRANSPLANT STATUS (H): ICD-10-CM

## 2025-01-15 PROCEDURE — 99213 OFFICE O/P EST LOW 20 MIN: CPT

## 2025-01-15 PROCEDURE — 99215 OFFICE O/P EST HI 40 MIN: CPT

## 2025-01-15 RX ORDER — LOPERAMIDE HYDROCHLORIDE 2 MG/1
TABLET ORAL
COMMUNITY
Start: 2024-12-27

## 2025-01-15 RX ORDER — LENALIDOMIDE 10 MG/1
CAPSULE ORAL
COMMUNITY
Start: 2024-11-20

## 2025-01-15 RX ORDER — ASPIRIN 81 MG/1
TABLET, CHEWABLE ORAL
COMMUNITY
Start: 2024-12-27

## 2025-01-15 ASSESSMENT — PAIN SCALES - GENERAL: PAINLEVEL_OUTOF10: NO PAIN (0)

## 2025-01-15 NOTE — LETTER
1/15/2025      Inocente Romero  [address] [facility] [address]      Dear Colleague,    Thank you for referring your patient, Inocente Romero, to the [facility] [place]. Please see a copy of my visit note below.    Date: Ankit 15, 2025    Chief Complaint: bmt d+ 183     History Of Present Illness:  Felix is here today with wife Abiola for D180 BAN. Doing pretty well overall. Started maintenance Revlimid/Velcade about 2 months ago. Tolerating well so far other than dry skin. No fevers, recent infections, SOB, N/V, diarrhea, bleeding. Energy and appetite improving.     ROS: 10-point ROS is negative unless otherwise stated in HPI.    Oncology History from Prior Notes:  Standard risk MM, s/p VRd induction and trent 200 ASCT on 7/16/24.     Medications:  Current Outpatient Medications   Medication Sig Dispense Refill     acyclovir (ZOVIRAX) 800 MG tablet Take 1 tablet (800 mg) by mouth 2 times daily Start Day -1 60 tablet 11     amLODIPine (NORVASC) 10 MG tablet Take 1 tablet (10 mg) by mouth daily 30 tablet 11     aspirin (ASA) 81 MG chewable tablet        Bortezomib (VELCADE IJ) Inject as directed.       Cyanocobalamin 1000 MCG/ML KIT Inject 1,000 mcg as directed every 30 days.       LENalidomide (REVLIMID) 10 MG CAPS capsule        lisinopril (ZESTRIL) 20 MG tablet Take 20 mg by mouth every morning       loperamide (IMODIUM A-D) 2 MG tablet        magnesium 200 MG TABS Take 200 mg by mouth daily       metoprolol succinate ER (TOPROL XL) 100 MG 24 hr tablet Take 100 mg by mouth every evening       OLANZapine (ZYPREXA) 2.5 MG tablet Take 5 mg by mouth nightly as needed (sleep problems).       potassium chloride ER (K-TAB/KLOR-CON) 10 MEQ CR tablet Take 10 mEq by mouth daily.       Zoledronic Acid (ZOMETA IV)        ondansetron (ZOFRAN) 8 MG tablet Take 1 tablet (8 mg) by mouth every 8 hours as needed (for nausea / vomiting) (Patient not taking: Reported on  8/15/2024) 30 tablet 0     prochlorperazine (COMPAZINE) 5 MG tablet Take 1-2 tablets (5-10 mg) by mouth every 6 hours as needed for nausea or vomiting (Patient not taking: Reported on 7/31/2024) 30 tablet 1     No current facility-administered medications for this visit.     Updated PMH:  No past medical history on file.    Updated PSH:  Past Surgical History:   Procedure Laterality Date     INSERT CATHETER VASCULAR ACCESS Right 7/8/2024    Procedure: central venous catheter placement, tunneled Insert vascular access;  Surgeon: Arnold Butler MD;  Location: [facility] OR     IR CVC TUNNEL PLACEMENT > 5 YRS OF AGE  7/8/2024     IR CVC TUNNEL REMOVAL RIGHT  8/13/2024     Updated FH:  No family history on file.  Updated SH:  Social History     Tobacco Use     Smoking status: Never     Smokeless tobacco: Never     Physical Exam:  Vitals: /76 (BP Location: Right arm, Patient Position: Sitting, Cuff Size: Adult Regular)   Pulse 68   Temp 97.8  F (36.6  C) (Oral)   Resp 20   Wt 108.8 kg (239 lb 14.4 oz)   SpO2 97%   BMI 32.54 kg/m    Wt Readings from Last 3 Encounters:   01/15/25 108.8 kg (239 lb 14.4 oz)   11/01/24 107.7 kg (237 lb 8 oz)   10/25/24 107 kg (235 lb 14.3 oz)      General: Awake, alert, in no acute distress.   HEENT: Normocephalic, atraumatic. No scleral icterus.   CV: Regular rate and rhythm. No murmurs, rubs, or gallops appreciated.  Resp: Good inspiratory effort, lungs clear to auscultation bilaterally.  GI: Abdomen soft, nontender, nondistended.   Ext: No peripheral edema bilaterally.  Neuro: CN II-XII grossly intact. No focal deficits appreciated.  Skin: No rash, unusual bruising or prominent lesions.  Psych: Pleasant, normal affect.     KPS: 80    Labs, pathology and other diagnostics reviewed    WBC Count   Date Value Ref Range Status   01/13/2025 8.7 4.0 - 11.0 10e3/uL Final     Hemoglobin   Date Value Ref Range Status   01/13/2025 13.7 13.3 - 17.7 g/dL Final     Platelet Count   Date Value  Ref Range Status   01/13/2025 184 150 - 450 10e3/uL Final     Creatinine   Date Value Ref Range Status   01/13/2025 0.84 0.67 - 1.17 mg/dL Final     Potassium   Date Value Ref Range Status   01/13/2025 3.8 3.4 - 5.3 mmol/L Final     Magnesium   Date Value Ref Range Status   01/13/2025 1.9 1.7 - 2.3 mg/dL Final       A/P    #Standard risk MM  - Day: 183 from ASCT with melphalan conditioning  - Staging: VGPR from time diagnosis (M spike 6.35 -> 0.6) but M-spike did go up slightly from 0.4 at D100 to 0.6 now (IgG kappa on immunofix). [facility] where it was 0.46 in Nov and 0.54 in Dec so it is going up slowly. Recommend to closely monitor M-spike monthly, if continues to increase may need to switch maintenance or switch to a rebeca-based regimen.   - Maintenance: continue dual maintenance with Velcade/Revlimid for now.      #ID  - Ppx: continue ACV for 1 year. Okay to stop Bactrim now that he is 6 months out.   - Vaccines: up to date on flu shot, remainder of childhood vaccines to start at 1 year.     PLAN:  - Monitor M-spike closely, may need to switch tx if progressing  - Otherwise RTC for 1 year BAN    Total of 40 minutes on patient visit, reviewing records, interpreting test results, placing orders, and documentation on the day of service.    Myesha Monet MD  Attending Physician, [facility]       Again, thank you for allowing me to participate in the care of your patient.        Sincerely,         [facility]    Electronically signed

## 2025-01-15 NOTE — NURSING NOTE
Oncology Rooming Note    January 15, 2025 1:05 PM   Inocente Romero is a 64 year old male who presents for:    Chief Complaint   Patient presents with    Oncology Clinic Visit     Multiple Myeloma      Initial Vitals: /76 (BP Location: Right arm, Patient Position: Sitting, Cuff Size: Adult Regular)   Pulse 68   Temp 97.8  F (36.6  C) (Oral)   Resp 20   Wt 108.8 kg (239 lb 14.4 oz)   SpO2 97%   BMI 32.54 kg/m   Estimated body mass index is 32.54 kg/m  as calculated from the following:    Height as of 7/25/24: 1.829 m (6').    Weight as of this encounter: 108.8 kg (239 lb 14.4 oz). Body surface area is 2.35 meters squared.  No Pain (0) Comment: Data Unavailable   No LMP for male patient.  Allergies reviewed: Yes  Medications reviewed: Yes    Medications: MEDICATION REFILLS NEEDED TODAY. Provider was notified.  Pharmacy name entered into EPIC:    [facility] - [place], [place] - [address] [facility]  [facility] - [place], [place] - [address] [place] [address] [address]    Frailty Screening:   Is the patient here for a new oncology consult visit in cancer care? 2. No      Clinical concerns: Wondering if he can get syringes for his b12 vaccines     Zina Machado

## 2025-01-20 ENCOUNTER — TELEPHONE (OUTPATIENT)
Dept: TRANSPLANT | Facility: CLINIC | Age: 65
End: 2025-01-20

## 2025-01-20 NOTE — TELEPHONE ENCOUNTER
Left voicemail with [facility] with [facility]. Requested that they fax us patient's monthly labs. Waiting on call back.     Ileana Cotton, RN BSN  B[facility] RN Care Coordinator   Phone [phone]  Pager [phone]

## 2025-01-21 ENCOUNTER — TELEPHONE (OUTPATIENT)
Dept: TRANSPLANT | Facility: CLINIC | Age: 65
End: 2025-01-21

## 2025-01-21 NOTE — TELEPHONE ENCOUNTER
Discussed with Claudette IRIZARRY at the [facility]. She will make note to fax patient's monthly lab orders to us. Next lab appointment on 1/24.

## 2025-05-08 ENCOUNTER — MEDICAL CORRESPONDENCE (OUTPATIENT)
Dept: TRANSPLANT | Facility: CLINIC | Age: 65
End: 2025-05-08

## 2025-05-19 ENCOUNTER — MEDICAL CORRESPONDENCE (OUTPATIENT)
Dept: TRANSPLANT | Facility: CLINIC | Age: 65
End: 2025-05-19

## 2025-06-14 ENCOUNTER — HEALTH MAINTENANCE LETTER (OUTPATIENT)
Age: 65
End: 2025-06-14

## 2025-07-25 ENCOUNTER — HOSPITAL ENCOUNTER (OUTPATIENT)
Dept: PET IMAGING | Facility: CLINIC | Age: 65
Discharge: HOME | End: 2025-07-25
Attending: INTERNAL MEDICINE | Admitting: INTERNAL MEDICINE
Payer: COMMERCIAL

## 2025-07-25 ENCOUNTER — APPOINTMENT (OUTPATIENT)
Dept: LAB | Facility: CLINIC | Age: 65
End: 2025-07-25
Payer: COMMERCIAL

## 2025-07-25 DIAGNOSIS — C90.00 MULTIPLE MYELOMA, REMISSION STATUS UNSPECIFIED (H): ICD-10-CM

## 2025-07-25 PROCEDURE — 343N000001 HC RX 343 MED OP 636

## 2025-07-25 PROCEDURE — 78816 PET IMAGE W/CT FULL BODY: CPT | Mod: PS

## 2025-07-25 PROCEDURE — A9552 F18 FDG: HCPCS

## 2025-07-25 RX ORDER — FLUDEOXYGLUCOSE F 18 200 MCI/ML
10-18 INJECTION, SOLUTION INTRAVENOUS ONCE
Status: COMPLETED | OUTPATIENT
Start: 2025-07-25 | End: 2025-07-25

## 2025-07-25 RX ADMIN — FLUDEOXYGLUCOSE F 18 13.85 MILLICURIE: 200 INJECTION, SOLUTION INTRAVENOUS at 07:01

## (undated) DEVICE — SU ETHILON 2-0 FS 18" 664H

## (undated) DEVICE — DECANTER BAG 2002S

## (undated) DEVICE — CAP LUER LOCK MALE/FEMALE DUAL 2C6250

## (undated) DEVICE — ADH SKIN CLOSURE PREMIERPRO EXOFIN MICOR HV 0.5ML 3471

## (undated) DEVICE — GLOVE BIOGEL PI ULTRATOUCH G SZ 8.0 42180

## (undated) DEVICE — PACK CENTRAL LINE INSERTION SAN32CLFCG

## (undated) DEVICE — COVER EASY EQUIP BAG W/BAND LATEX FREE EZ-28

## (undated) DEVICE — GOWN XLG DISP 9545

## (undated) DEVICE — DRSG BIOPATCH GERMICIDAL SPLIT SPONGE 7MM LG

## (undated) DEVICE — PROBE COVER INTRAOPERATIVE 5"X96" PC1308

## (undated) DEVICE — LINEN GOWN XLG 5407

## (undated) DEVICE — LINEN TOWEL PACK X5 5464

## (undated) DEVICE — DILATOR VASCULAR COONS 12FRX20CM DIST TPR G03929

## (undated) DEVICE — KIT INTRODUCER FLUENT MICRO 5FRX10CM ECHO TIP KIT-038-04

## (undated) RX ORDER — ONDANSETRON 2 MG/ML
INJECTION INTRAMUSCULAR; INTRAVENOUS
Status: DISPENSED
Start: 2024-07-08

## (undated) RX ORDER — FENTANYL CITRATE 50 UG/ML
INJECTION, SOLUTION INTRAMUSCULAR; INTRAVENOUS
Status: DISPENSED
Start: 2024-07-08

## (undated) RX ORDER — CALCIUM GLUCONATE 94 MG/ML
INJECTION, SOLUTION INTRAVENOUS
Status: DISPENSED
Start: 2024-07-09

## (undated) RX ORDER — DIPHENHYDRAMINE HYDROCHLORIDE 50 MG/ML
INJECTION INTRAMUSCULAR; INTRAVENOUS
Status: DISPENSED
Start: 2024-07-08

## (undated) RX ORDER — LIDOCAINE HYDROCHLORIDE 10 MG/ML
INJECTION, SOLUTION EPIDURAL; INFILTRATION; INTRACAUDAL; PERINEURAL
Status: DISPENSED
Start: 2024-08-13

## (undated) RX ORDER — CALCIUM GLUCONATE 94 MG/ML
INJECTION, SOLUTION INTRAVENOUS
Status: DISPENSED
Start: 2024-07-10

## (undated) RX ORDER — CEFAZOLIN SODIUM 2 G/50ML
SOLUTION INTRAVENOUS
Status: DISPENSED
Start: 2024-07-08